# Patient Record
Sex: FEMALE | Race: BLACK OR AFRICAN AMERICAN | Employment: FULL TIME | URBAN - METROPOLITAN AREA
[De-identification: names, ages, dates, MRNs, and addresses within clinical notes are randomized per-mention and may not be internally consistent; named-entity substitution may affect disease eponyms.]

---

## 2017-01-10 ENCOUNTER — HOSPITAL ENCOUNTER (EMERGENCY)
Age: 22
Discharge: HOME | End: 2017-01-10
Attending: EMERGENCY MEDICINE | Admitting: EMERGENCY MEDICINE
Payer: COMMERCIAL

## 2017-01-10 VITALS
DIASTOLIC BLOOD PRESSURE: 80 MMHG | TEMPERATURE: 98.2 F | SYSTOLIC BLOOD PRESSURE: 87 MMHG | RESPIRATION RATE: 16 BRPM | OXYGEN SATURATION: 100 % | BODY MASS INDEX: 18.48 KG/M2 | HEIGHT: 68 IN | HEART RATE: 77 BPM | WEIGHT: 121.91 LBS

## 2017-01-10 DIAGNOSIS — N39.0 URINARY TRACT INFECTION, SITE UNSPECIFIED: Primary | ICD-10-CM

## 2017-01-10 DIAGNOSIS — B37.31 YEAST INFECTION OF THE VAGINA: ICD-10-CM

## 2017-01-10 LAB
APPEARANCE UR: ABNORMAL
BACTERIA URNS QL MICRO: ABNORMAL /HPF
BILIRUB UR QL CFM: NEGATIVE
CLUE CELLS VAG QL WET PREP: NORMAL
COLOR UR: ABNORMAL
EPITH CASTS URNS QL MICRO: ABNORMAL /LPF
GLUCOSE UR STRIP.AUTO-MCNC: NEGATIVE MG/DL
HCG UR QL: NEGATIVE
HGB UR QL STRIP: ABNORMAL
KETONES UR QL STRIP.AUTO: ABNORMAL MG/DL
KOH PREP SPEC: NORMAL
LEUKOCYTE ESTERASE UR QL STRIP.AUTO: ABNORMAL
MUCOUS THREADS URNS QL MICRO: ABNORMAL /LPF
NITRITE UR QL STRIP.AUTO: NEGATIVE
PH UR STRIP: 6 [PH] (ref 5–8)
PROT UR STRIP-MCNC: ABNORMAL MG/DL
RBC #/AREA URNS HPF: ABNORMAL /HPF (ref 0–5)
SERVICE CMNT-IMP: NORMAL
SP GR UR REFRACTOMETRY: 1.03 (ref 1–1.03)
T VAGINALIS VAG QL WET PREP: NORMAL
UA: UC IF INDICATED,UAUC: ABNORMAL
UROBILINOGEN UR QL STRIP.AUTO: 0.2 EU/DL (ref 0.2–1)
WBC URNS QL MICRO: ABNORMAL /HPF (ref 0–4)

## 2017-01-10 PROCEDURE — 87210 SMEAR WET MOUNT SALINE/INK: CPT | Performed by: EMERGENCY MEDICINE

## 2017-01-10 PROCEDURE — 87491 CHLMYD TRACH DNA AMP PROBE: CPT | Performed by: EMERGENCY MEDICINE

## 2017-01-10 PROCEDURE — 99284 EMERGENCY DEPT VISIT MOD MDM: CPT

## 2017-01-10 PROCEDURE — 81001 URINALYSIS AUTO W/SCOPE: CPT | Performed by: EMERGENCY MEDICINE

## 2017-01-10 PROCEDURE — 87186 SC STD MICRODIL/AGAR DIL: CPT | Performed by: EMERGENCY MEDICINE

## 2017-01-10 PROCEDURE — 81025 URINE PREGNANCY TEST: CPT | Performed by: EMERGENCY MEDICINE

## 2017-01-10 PROCEDURE — 87077 CULTURE AEROBIC IDENTIFY: CPT | Performed by: EMERGENCY MEDICINE

## 2017-01-10 PROCEDURE — 87086 URINE CULTURE/COLONY COUNT: CPT | Performed by: EMERGENCY MEDICINE

## 2017-01-10 RX ORDER — CEPHALEXIN 500 MG/1
500 CAPSULE ORAL 4 TIMES DAILY
Qty: 28 CAP | Refills: 0 | Status: SHIPPED | OUTPATIENT
Start: 2017-01-10 | End: 2017-01-17

## 2017-01-10 RX ORDER — FLUCONAZOLE 150 MG/1
150 TABLET ORAL
Qty: 1 TAB | Refills: 1 | Status: SHIPPED | OUTPATIENT
Start: 2017-01-10 | End: 2017-01-10

## 2017-01-10 NOTE — ED PROVIDER NOTES
HPI Comments: Koen Reddy is a 19 y.o. female who presents ambulatory to the ED c/o progressively worsening dysuria with associated LLQ pain x 2 days. Pt states her sx's began after having sexual intercourse and notes the condom may have irritated her. Pt notes her LMP was 12/29 and reportedly normal. She denies any known hx of long standing diseases or daily medications. Pt specifically denies any recent fever, chills, nausea, vomiting, diarrhea, hematuria, changes in BM, vaginal bleeding, vaginal discharge, vaginal rashes or sores. Social history significant for: - Tobacco, - EtOH, - Illicit drug use    PCP: Alanna Pascual MD    There are no other complaints, changes or physical findings at this time. Written by NELSON Larios, as dictated by Rehan Polk MD        The history is provided by the patient. No  was used. Past Medical History:   Diagnosis Date    Asthma        Past Surgical History:   Procedure Laterality Date    Hx orthopaedic       ACL repaired         History reviewed. No pertinent family history. Social History     Social History    Marital status: SINGLE     Spouse name: N/A    Number of children: N/A    Years of education: N/A     Occupational History    Not on file. Social History Main Topics    Smoking status: Never Smoker    Smokeless tobacco: Not on file    Alcohol use No    Drug use: No    Sexual activity: Yes     Partners: Male     Birth control/ protection: Pill     Other Topics Concern    Not on file     Social History Narrative         ALLERGIES: Latex and Pcn [penicillins]    Review of Systems   Constitutional: Negative. HENT: Negative. Eyes: Negative. Respiratory: Negative. Cardiovascular: Negative. Gastrointestinal: Positive for abdominal pain (LLQ). Endocrine: Negative. Genitourinary: Positive for dysuria. Negative for genital sores, vaginal bleeding, vaginal discharge and vaginal pain. Musculoskeletal: Negative. Skin: Negative. Negative for rash. Allergic/Immunologic: Negative. Neurological: Negative. Hematological: Negative. Psychiatric/Behavioral: Negative. All other systems reviewed and are negative. Vitals:    01/10/17 0047   BP: (!) 87/80   Pulse: 77   Resp: 16   Temp: 98.2 °F (36.8 °C)   SpO2: 100%   Weight: 55.3 kg (121 lb 14.6 oz)   Height: 5' 8\" (1.727 m)            Physical Exam   Nursing note and vitals reviewed.   General appearance - well nourished, well appearing, and in no distress  Eyes - pupils equal and reactive, extraocular eye movements intact  ENT - mucous membranes moist, pharynx normal without lesions  Neck - supple, no significant adenopathy; non-tender to palpation  Chest - clear to auscultation, no wheezes, rales or rhonchi; non-tender to palpation  Heart - normal rate and regular rhythm, S1 and S2 normal, no murmurs noted  Abdomen - soft, mild lower quadrant tenderness, nondistended, no masses or organomegaly  Musculoskeletal - no joint tenderness, deformity or swelling; normal ROM  Extremities - peripheral pulses normal, no pedal edema  Skin - normal coloration and turgor, no rashes  Neurological - alert, oriented x3, normal speech, no focal findings or movement disorder noted   - small amount of mucoid discharge, tenderness over the L adnexa, cervical OS closed, no CMT  Written by Jackie Gonzalez ED Scribe, as dictated by Marisela Gaines MD    MDM  Number of Diagnoses or Management Options  Urinary tract infection, site unspecified:   Yeast infection of the vagina:   Diagnosis management comments:   DDx: UTI, STD, PID       Amount and/or Complexity of Data Reviewed  Clinical lab tests: ordered and reviewed  Review and summarize past medical records: yes    Patient Progress  Patient progress: stable      Procedures    Procedure Note - Pelvic Exam:    3:14 AM  Performed by: Marisela Gaines MD  Chaperoned by: Abdirizak Lucero RN  Pelvic exam was performed using bimanual and speculum. Further findings noted in physical exam.   The procedure took 1-15 minutes, and pt tolerated well. Written by NELSON Givens, as dictated by Lucía Arndt MD    LABORATORY TESTS:  Recent Results (from the past 12 hour(s))   URINALYSIS W/ REFLEX CULTURE    Collection Time: 01/10/17  1:29 AM   Result Value Ref Range    Color YELLOW/STRAW      Appearance CLOUDY (A) CLEAR      Specific gravity 1.029 1.003 - 1.030      pH (UA) 6.0 5.0 - 8.0      Protein TRACE (A) NEG mg/dL    Glucose NEGATIVE  NEG mg/dL    Ketone TRACE (A) NEG mg/dL    Blood SMALL (A) NEG      Urobilinogen 0.2 0.2 - 1.0 EU/dL    Nitrites NEGATIVE  NEG      Leukocyte Esterase MODERATE (A) NEG      WBC 20-50 0 - 4 /hpf    RBC 0-5 0 - 5 /hpf    Epithelial cells MODERATE (A) FEW /lpf    Bacteria 3+ (A) NEG /hpf    UA:UC IF INDICATED URINE CULTURE ORDERED (A) CNI      Mucus 1+ (A) NEG /lpf   BILIRUBIN, CONFIRM    Collection Time: 01/10/17  1:29 AM   Result Value Ref Range    Bilirubin UA, confirm NEGATIVE  NEG     KOH, OTHER SOURCES    Collection Time: 01/10/17  3:16 AM   Result Value Ref Range    Special Requests: NO SPECIAL REQUESTS      KOH       1+ YEAST PRESENT, MANY WITH PSEUDOHYPHAE AND SOME ARE BUDDING   WET PREP    Collection Time: 01/10/17  3:16 AM   Result Value Ref Range    Clue cells CLUE CELLS ABSENT      Wet prep NO TRICHOMONAS SEEN     HCG URINE, QL    Collection Time: 01/10/17  4:25 AM   Result Value Ref Range    HCG urine, Ql. NEGATIVE  NEG         IMAGING RESULTS:  No orders to display       MEDICATIONS GIVEN:  Medications - No data to display    IMPRESSION:  1. Urinary tract infection, site unspecified    2. Yeast infection of the vagina        PLAN:  1. Discharge Medication List as of 1/10/2017  4:20 AM      START taking these medications    Details   fluconazole (DIFLUCAN) 150 mg tablet Take 1 Tab by mouth now for 1 dose.  FDA advises cautious prescribing of oral fluconazole in pregnancy. , Print, Disp-1 Tab, R-1      cephALEXin (KEFLEX) 500 mg capsule Take 1 Cap by mouth four (4) times daily for 7 days. , Print, Disp-28 Cap, R-0         CONTINUE these medications which have NOT CHANGED    Details   ibuprofen (MOTRIN) 400 mg tablet Take 1 Tab by mouth every six (6) hours as needed for Pain., Print, Disp-20 Tab, R-0      loratadine (CLARITIN) 10 mg tablet Take 1 Tab by mouth daily. , Print, Disp-20 Tab, R-0      albuterol (PROVENTIL HFA, VENTOLIN HFA, PROAIR HFA) 90 mcg/actuation inhaler Take 1-2 Puffs by inhalation every four (4) hours as needed for Wheezing., Print, Disp-1 Inhaler, R-1      fluticasone (FLONASE) 50 mcg/actuation nasal spray 2 Sprays by Both Nostrils route daily. , Print, Disp-1 Bottle, R-0           2. Follow-up Information     Follow up With Details Comments Contact Info    [facility] EMERGENCY DEPT  If symptoms worsen [address] [facility] [address] [place] [address] [address]          Return to ED if worse     DISCHARGE NOTE:  4:20 AM  The patient's results have been reviewed with family and/or caregiver. They verbally convey their understanding and agreement of the patient's signs, symptoms, diagnosis, treatment, and prognosis. They additionally agree to follow up as recommended in the discharge instructions or to return to the Emergency Room should the patient's condition change prior to their follow-up appointment. The family and/or caregiver verbally agrees with the care-plan and all of their questions have been answered. The discharge instructions have also been provided to the them along with educational information regarding the patient's diagnosis and a list of reasons why the patient would want to return to the ER prior to their follow-up appointment should their condition change.   Written by NELSON Rey, as dictated by Nnamdi Rivas MD.         This note is prepared by Darby Wallace, acting as Scribe for MD Lucía Chiu MD : The scribe's documentation has been prepared under my direction and personally reviewed by me in its entirety. I confirm that the note above accurately reflects all work, treatment, procedures, and medical decision making performed by me. This note will not be viewable in [address]

## 2017-01-10 NOTE — DISCHARGE INSTRUCTIONS
Candidiasis: Care Instructions  Your Care Instructions  Candidiasis (say \"mgf-qbg-DY-uh-ervin\") is a yeast infection. Yeast normally lives in your body. But it can cause problems if your body's defenses don't work as they should. Some medicines can increase your chance of getting a yeast infection. These include antibiotics, steroids, and cancer drugs. And some diseases like AIDS and diabetes can make you more likely to get yeast infections. There are different types of yeast infections. Ritchie Pile is a yeast infection in the mouth. It usually occurs in people with weak immune systems. It causes white patches inside the mouth and throat. Yeast infections of the skin usually occur in skin folds where the skin stays moist. They cause red, oozing patches on your skin. Babies can get these infections under the diaper. People who often wear gloves can get them on their hands. Many women get vaginal yeast infections. They are most common when women take antibiotics. These infections can cause the vagina to itch and burn. They also cause white discharge that looks like cottage cheese. In rare cases, yeast infects the blood. This can cause serious disease. This kind of infection is treated with medicine given through a needle into a vein (IV). After you start treatment, a yeast infection usually goes away quickly. But if your immune system is weak, the infection may come back. Tell your doctor if you get yeast infections often. Follow-up care is a key part of your treatment and safety. Be sure to make and go to all appointments, and call your doctor if you are having problems. It's also a good idea to know your test results and keep a list of the medicines you take. How can you care for yourself at home? · Take your medicines exactly as prescribed. Call your doctor if you think you are having a problem with your medicine. · Use antibiotics only as directed by your doctor. · Eat yogurt with live cultures.  It has bacteria called lactobacillus. It may help prevent some types of yeast infections. · Keep your skin clean and dry. Put powder on moist places. · If you are using a cream or suppository to treat a vaginal yeast infection, don't use condoms or a diaphragm. Use a different type of birth control. · Eat a healthy diet and get regular exercise. This will help keep your immune system strong. When should you call for help? Call your doctor now or seek immediate medical care if:  · You have a fever. · You are pregnant and have signs of a vaginal or urinary tract infection such as:  ¨ Severe itching in your vagina. ¨ Pain during sex or when you urinate. ¨ Unusual discharge from your vagina. ¨ A frequent urge to urinate. ¨ Urine that is cloudy or smells bad. Watch closely for changes in your health, and be sure to contact your doctor if:  · You do not get better as expected. Where can you learn more? Go to http://dee-chuy.info/. Enter S289 in the search box to learn more about \"Candidiasis: Care Instructions. \"  Current as of: February 5, 2016  Content Version: 11.1  © 4465-2923 [employer]. Care instructions adapted under license by [employer] (which disclaims liability or warranty for this information). If you have questions about a medical condition or this instruction, always ask your healthcare professional. [place] [address] any warranty or liability for your use of this information. Urinary Tract Infection in Women: Care Instructions  Your Care Instructions    A urinary tract infection, or UTI, is a general term for an infection anywhere between the kidneys and the urethra (where urine comes out). Most UTIs are bladder infections. They often cause pain or burning when you urinate. UTIs are caused by bacteria and can be cured with antibiotics. Be sure to complete your treatment so that the infection goes away.   Follow-up care is a key part of your treatment and safety. Be sure to make and go to all appointments, and call your doctor if you are having problems. It's also a good idea to know your test results and keep a list of the medicines you take. How can you care for yourself at home? · Take your antibiotics as directed. Do not stop taking them just because you feel better. You need to take the full course of antibiotics. · Drink extra water and other fluids for the next day or two. This may help wash out the bacteria that are causing the infection. (If you have kidney, heart, or liver disease and have to limit fluids, talk with your doctor before you increase your fluid intake.)  · Avoid drinks that are carbonated or have caffeine. They can irritate the bladder. · Urinate often. Try to empty your bladder each time. · To relieve pain, take a hot bath or lay a heating pad set on low over your lower belly or genital area. Never go to sleep with a heating pad in place. To prevent UTIs  · Drink plenty of water each day. This helps you urinate often, which clears bacteria from your system. (If you have kidney, heart, or liver disease and have to limit fluids, talk with your doctor before you increase your fluid intake.)  · Consider adding cranberry juice to your diet. · Urinate when you need to. · Urinate right after you have sex. · Change sanitary pads often. · Avoid douches, bubble baths, feminine hygiene sprays, and other feminine hygiene products that have deodorants. · After going to the bathroom, wipe from front to back. When should you call for help? Call your doctor now or seek immediate medical care if:  · Symptoms such as fever, chills, nausea, or vomiting get worse or appear for the first time. · You have new pain in your back just below your rib cage. This is called flank pain. · There is new blood or pus in your urine. · You have any problems with your antibiotic medicine.   Watch closely for changes in your health, and be sure to contact your doctor if:  · You are not getting better after taking an antibiotic for 2 days. · Your symptoms go away but then come back. Where can you learn more? Go to http://dee-chuy.info/. Enter P141 in the search box to learn more about \"Urinary Tract Infection in Women: Care Instructions. \"  Current as of: August 12, 2016  Content Version: 11.1  © 2303-5030 [employer]. Care instructions adapted under license by [employer] (which disclaims liability or warranty for this information). If you have questions about a medical condition or this instruction, always ask your healthcare professional. [address] any warranty or liability for your use of this information.

## 2017-01-10 NOTE — ED NOTES
Lucía Bradley reviewed discharge instructions with the patient. The patient verbalized understanding. All questions and concerns were addressed. The patient is discharged ambulatory in the care of family members with instructions and prescriptions in hand. Pt is alert and oriented x 4. Respirations are clear and unlabored.

## 2017-01-10 NOTE — ED NOTES
Bedside and Verbal shift change report given to [facility], RN by Carlos Coleman RN. Report included the following information SBAR, ED Summary and MAR.

## 2017-01-11 LAB
C TRACH DNA SPEC QL NAA+PROBE: NEGATIVE
N GONORRHOEA DNA SPEC QL NAA+PROBE: NEGATIVE
SAMPLE TYPE: NORMAL
SERVICE CMNT-IMP: NORMAL
SPECIMEN SOURCE: NORMAL

## 2017-01-12 LAB
BACTERIA SPEC CULT: NORMAL
CC UR VC: NORMAL
SERVICE CMNT-IMP: NORMAL

## 2017-01-18 ENCOUNTER — HOSPITAL ENCOUNTER (EMERGENCY)
Age: 22
Discharge: HOME | End: 2017-01-18
Attending: INTERNAL MEDICINE
Payer: COMMERCIAL

## 2017-01-18 VITALS
HEART RATE: 55 BPM | BODY MASS INDEX: 17.58 KG/M2 | DIASTOLIC BLOOD PRESSURE: 69 MMHG | OXYGEN SATURATION: 97 % | TEMPERATURE: 97.7 F | SYSTOLIC BLOOD PRESSURE: 125 MMHG | WEIGHT: 116 LBS | HEIGHT: 68 IN | RESPIRATION RATE: 18 BRPM

## 2017-01-18 DIAGNOSIS — J06.9 ACUTE UPPER RESPIRATORY INFECTION: Primary | ICD-10-CM

## 2017-01-18 PROCEDURE — 99283 EMERGENCY DEPT VISIT LOW MDM: CPT

## 2017-01-18 RX ORDER — CETIRIZINE HYDROCHLORIDE, PSEUDOEPHEDRINE HYDROCHLORIDE 5; 120 MG/1; MG/1
1 TABLET, FILM COATED, EXTENDED RELEASE ORAL 2 TIMES DAILY
Qty: 10 TAB | Refills: 0 | Status: SHIPPED | OUTPATIENT
Start: 2017-01-18 | End: 2017-01-23

## 2017-01-18 NOTE — LETTER
[facility] EMERGENCY DEPT 
[address] [address] 
[phone] Work/School Note Date: 1/18/2017 To Whom It May concern: 
 
Blane Rinne was seen and treated today in the emergency room by the following provider(s): 
Attending Provider: Aaliyah Mckeon MD.   
 
Shade Anabelne {Return to work 1/19 Sincerely, Aaliyah Mckeon MD

## 2017-01-19 NOTE — ED NOTES
Patient given copy of dc instructions and 1 script(s). Patient verbalized understanding of instructions and script (s). Patient given a current medication reconciliation form and verbalized understanding of their medications. Patient verbalized understanding of the importance of discussing medications with  his or her physician or clinic they will be following up with. Patient alert and oriented and in no acute distress. Patient discharged home ambulatory with self.

## 2017-01-19 NOTE — DISCHARGE INSTRUCTIONS

## 2017-01-19 NOTE — ED NOTES
Patient reports to ED c/o chest congestion, sore throat and HA x 2 days. Patient reports hx of asthma and is out of inhaler. Patient in NAD. Emergency Department Nursing Plan of Care       The Nursing Plan of Care is developed from the Nursing assessment and Emergency Department Attending provider initial evaluation. The plan of care may be reviewed in the ED Provider note.     The Plan of Care was developed with the following considerations:   Patient / Family readiness to learn indicated by:verbalized understanding  Persons(s) to be included in education: patient  Barriers to Learning/Limitations:No    Signed     Dianne Glass RN    1/18/2017   7:01 PM

## 2017-01-19 NOTE — ED PROVIDER NOTES
Patient is a 19 y.o. female presenting with cold symptoms. Cold Symptoms    This is a new problem. The current episode started 2 days ago. There has been no fever. Associated symptoms include congestion, rhinorrhea, sinus pain and cough. Pertinent negatives include no ear pain. Treatments tried: otc meds. Past Medical History:   Diagnosis Date    Asthma        Past Surgical History:   Procedure Laterality Date    Hx orthopaedic       ACL repaired         No family history on file. Social History     Social History    Marital status: SINGLE     Spouse name: N/A    Number of children: N/A    Years of education: N/A     Occupational History    Not on file. Social History Main Topics    Smoking status: Never Smoker    Smokeless tobacco: Not on file    Alcohol use No    Drug use: No    Sexual activity: Yes     Partners: Male     Birth control/ protection: Pill     Other Topics Concern    Not on file     Social History Narrative         ALLERGIES: Latex and Pcn [penicillins]    Review of Systems   Constitutional: Negative. HENT: Positive for congestion and rhinorrhea. Negative for ear pain. Respiratory: Positive for cough. Cardiovascular: Negative. Gastrointestinal: Negative. Genitourinary: Negative. Musculoskeletal: Negative. Neurological: Negative. Vitals:    01/18/17 1752   BP: 125/69   Pulse: (!) 55   Resp: 18   Temp: 97.7 °F (36.5 °C)   SpO2: 97%   Weight: 52.6 kg (116 lb)   Height: 5' 8\" (1.727 m)            Physical Exam   Constitutional: She is oriented to person, place, and time. She appears well-developed and well-nourished. No distress. HENT:   Head: Normocephalic and atraumatic. Left Ear: External ear normal.   Mouth/Throat: Oropharynx is clear and moist. No oropharyngeal exudate. Eyes: Conjunctivae and EOM are normal. Pupils are equal, round, and reactive to light. Left eye exhibits no discharge. Neck: No JVD present.    Cardiovascular: Normal rate, regular rhythm and normal heart sounds. No murmur heard. Pulmonary/Chest: Effort normal and breath sounds normal. No respiratory distress. She has no wheezes. She has no rales. Abdominal: Soft. Bowel sounds are normal. She exhibits no distension. There is no tenderness. There is no rebound and no guarding. Musculoskeletal: Normal range of motion. She exhibits no edema. Neurological: She is alert and oriented to person, place, and time. Skin: Skin is warm. No erythema. Nursing note and vitals reviewed.        [facility]  ED Course       Procedures

## 2017-02-15 ENCOUNTER — HOSPITAL ENCOUNTER (EMERGENCY)
Age: 22
Discharge: LEFT BEFORE DISCHARGE | End: 2017-02-15
Attending: EMERGENCY MEDICINE
Payer: COMMERCIAL

## 2017-02-15 VITALS
WEIGHT: 117 LBS | HEIGHT: 68 IN | HEART RATE: 86 BPM | BODY MASS INDEX: 17.73 KG/M2 | DIASTOLIC BLOOD PRESSURE: 54 MMHG | OXYGEN SATURATION: 100 % | TEMPERATURE: 98.2 F | SYSTOLIC BLOOD PRESSURE: 105 MMHG | RESPIRATION RATE: 16 BRPM

## 2017-02-15 PROCEDURE — 75810000275 HC EMERGENCY DEPT VISIT NO LEVEL OF CARE

## 2017-03-20 ENCOUNTER — HOSPITAL ENCOUNTER (EMERGENCY)
Age: 22
Discharge: HOME | End: 2017-03-20
Attending: EMERGENCY MEDICINE
Payer: COMMERCIAL

## 2017-03-20 VITALS
SYSTOLIC BLOOD PRESSURE: 118 MMHG | HEIGHT: 68 IN | RESPIRATION RATE: 16 BRPM | BODY MASS INDEX: 17.43 KG/M2 | WEIGHT: 115 LBS | HEART RATE: 88 BPM | TEMPERATURE: 98.2 F | DIASTOLIC BLOOD PRESSURE: 47 MMHG | OXYGEN SATURATION: 100 %

## 2017-03-20 DIAGNOSIS — J01.00 ACUTE MAXILLARY SINUSITIS, RECURRENCE NOT SPECIFIED: Primary | ICD-10-CM

## 2017-03-20 LAB
APPEARANCE UR: ABNORMAL
BACTERIA URNS QL MICRO: NEGATIVE /HPF
BILIRUB UR QL: NEGATIVE
COLOR UR: ABNORMAL
EPITH CASTS URNS QL MICRO: ABNORMAL /LPF
GLUCOSE UR STRIP.AUTO-MCNC: NEGATIVE MG/DL
HCG UR QL: NEGATIVE
HGB UR QL STRIP: NEGATIVE
KETONES UR QL STRIP.AUTO: NEGATIVE MG/DL
LEUKOCYTE ESTERASE UR QL STRIP.AUTO: NEGATIVE
NITRITE UR QL STRIP.AUTO: NEGATIVE
PH UR STRIP: 5.5 [PH] (ref 5–8)
PROT UR STRIP-MCNC: NEGATIVE MG/DL
RBC #/AREA URNS HPF: ABNORMAL /HPF (ref 0–5)
SP GR UR REFRACTOMETRY: 1.02 (ref 1–1.03)
UA: UC IF INDICATED,UAUC: ABNORMAL
UROBILINOGEN UR QL STRIP.AUTO: 0.2 EU/DL (ref 0.2–1)
WBC URNS QL MICRO: ABNORMAL /HPF (ref 0–4)

## 2017-03-20 PROCEDURE — 81001 URINALYSIS AUTO W/SCOPE: CPT | Performed by: EMERGENCY MEDICINE

## 2017-03-20 PROCEDURE — 99283 EMERGENCY DEPT VISIT LOW MDM: CPT

## 2017-03-20 PROCEDURE — 81025 URINE PREGNANCY TEST: CPT

## 2017-03-20 RX ORDER — AZITHROMYCIN 250 MG/1
TABLET, FILM COATED ORAL
Qty: 6 TAB | Refills: 0 | Status: SHIPPED | OUTPATIENT
Start: 2017-03-20 | End: 2017-03-25

## 2017-03-20 RX ORDER — LORATADINE 10 MG/1
10 TABLET ORAL DAILY
Qty: 20 TAB | Refills: 0 | Status: SHIPPED | OUTPATIENT
Start: 2017-03-20 | End: 2017-04-02

## 2017-03-20 RX ORDER — BUTALBITAL, ACETAMINOPHEN AND CAFFEINE 300; 40; 50 MG/1; MG/1; MG/1
1 CAPSULE ORAL
Qty: 20 CAP | Refills: 0 | Status: SHIPPED | OUTPATIENT
Start: 2017-03-20 | End: 2017-04-02

## 2017-03-20 NOTE — ED NOTES
Pt D/C by Dr. Santi Biggs. Pt left unit steady gait with her mom. NAD noted. Patient (s)  given copy of dc instructions and 2 script(s). Patient(s)  verbalized understanding of instructions and script (s). Patient given a current medication reconciliation form and verbalized understanding of their medications. Patient (s) verbalized understanding of the importance of discussing medications with  his or her physician or clinic when they follow up. Patient alert and oriented and in no acute distress. Pt verbalizes pain scale of 5 out of 10. Patient discharged home ambulatory with mom.

## 2017-03-20 NOTE — DISCHARGE INSTRUCTIONS
Sinusitis: Care Instructions  Your Care Instructions    Sinusitis is an infection of the lining of the sinus cavities in your head. Sinusitis often follows a cold. It causes pain and pressure in your head and face. In most cases, sinusitis gets better on its own in 1 to 2 weeks. But some mild symptoms may last for several weeks. Sometimes antibiotics are needed. Follow-up care is a key part of your treatment and safety. Be sure to make and go to all appointments, and call your doctor if you are having problems. It's also a good idea to know your test results and keep a list of the medicines you take. How can you care for yourself at home? · Take an over-the-counter pain medicine, such as acetaminophen (Tylenol), ibuprofen (Advil, Motrin), or naproxen (Aleve). Read and follow all instructions on the label. · If the doctor prescribed antibiotics, take them as directed. Do not stop taking them just because you feel better. You need to take the full course of antibiotics. · Be careful when taking over-the-counter cold or flu medicines and Tylenol at the same time. Many of these medicines have acetaminophen, which is Tylenol. Read the labels to make sure that you are not taking more than the recommended dose. Too much acetaminophen (Tylenol) can be harmful. · Breathe warm, moist air from a steamy shower, a hot bath, or a sink filled with hot water. Avoid cold, dry air. Using a humidifier in your home may help. Follow the directions for cleaning the machine. · Use saline (saltwater) nasal washes to help keep your nasal passages open and wash out mucus and bacteria. You can buy saline nose drops at a grocery store or drugstore. Or you can make your own at home by adding 1 teaspoon of salt and 1 teaspoon of baking soda to 2 cups of distilled water. If you make your own, fill a bulb syringe with the solution, insert the tip into your nostril, and squeeze gently. Rosalva Arnav your nose.   · Put a hot, wet towel or a warm gel pack on your face 3 or 4 times a day for 5 to 10 minutes each time. · Try a decongestant nasal spray like oxymetazoline (Afrin). Do not use it for more than 3 days in a row. Using it for more than 3 days can make your congestion worse. When should you call for help? Call your doctor now or seek immediate medical care if:  · You have new or worse swelling or redness in your face or around your eyes. · You have a new or higher fever. Watch closely for changes in your health, and be sure to contact your doctor if:  · You have new or worse facial pain. · The mucus from your nose becomes thicker (like pus) or has new blood in it. · You are not getting better as expected. Where can you learn more? Go to http://dee-chuy.info/. Enter B607 in the search box to learn more about \"Sinusitis: Care Instructions. \"  Current as of: July 29, 2016  Content Version: 11.1  © [id number] [employer]. Care instructions adapted under license by [employer] (which disclaims liability or warranty for this information). If you have questions about a medical condition or this instruction, always ask your healthcare professional. [place] [address] any warranty or liability for your use of this information.

## 2017-03-20 NOTE — LETTER
[facility] EMERGENCY DEPT 
[address] [facility] [address] [address] 
[phone] Work/School Note Date: 3/20/2017 To Whom It May concern: 
 
Geoffrey Hernandez was seen and treated today in the emergency room by the following provider(s): 
Attending Provider: Lakisha Navarro MD.   
 
Geoffrey Hernandez may return to work on 3/21/2017. Sincerely, KAREN Jones

## 2017-03-20 NOTE — ED PROVIDER NOTES
HPI Comments: Robert Pickering is a 19 y.o. female with PMhx significant for Asthma who presents ambulatory to the ED with cc of gradually worsening HA x 2 days with associated eye pain, ear drainage, and nausea. Pt report that she took tylenol with no relief. Per chart review pt was seen at [facility] on 12/28/2016 for similar symptoms and was diagnosed with Acute frontal-Sinusitis. Pt also c/o back pain noting that its has been there \" for a while. \"  Pt denies any fevers, chills, N/V/D, or cough. Social history significant for: - Tobacco, - EtOH, - Illicit drug use    PCP: Monica Garcia MD    There are no other complaints, changes or physical findings at this time. Written by NELSON Benson, as dictated by Darien Flores. Kvng Galaviz MD      The history is provided by the patient. No  was used. Past Medical History:   Diagnosis Date    Asthma        Past Surgical History:   Procedure Laterality Date    HX ORTHOPAEDIC      ACL repaired         History reviewed. No pertinent family history. Social History     Social History    Marital status: SINGLE     Spouse name: N/A    Number of children: N/A    Years of education: N/A     Occupational History    Not on file. Social History Main Topics    Smoking status: Never Smoker    Smokeless tobacco: Not on file    Alcohol use No    Drug use: No    Sexual activity: Yes     Partners: Male     Birth control/ protection: Pill     Other Topics Concern    Not on file     Social History Narrative         ALLERGIES: Latex and Pcn [penicillins]    Review of Systems   Constitutional: Negative. Negative for activity change, appetite change, chills, fatigue and unexpected weight change. HENT: Positive for ear discharge. Negative for congestion, hearing loss, rhinorrhea, sneezing and voice change. Eyes: Positive for pain. Negative for visual disturbance. Respiratory: Negative.   Negative for apnea, cough, choking, chest tightness and shortness of breath. Cardiovascular: Negative. Negative for palpitations. Gastrointestinal: Negative. Negative for abdominal distention, blood in stool, diarrhea, nausea and vomiting. Genitourinary: Negative. Negative for difficulty urinating, flank pain, frequency and urgency. No discharge   Musculoskeletal: Negative for arthralgias, myalgias and neck stiffness. Skin: Negative. Negative for color change and rash. Neurological: Negative for dizziness, seizures, syncope and speech difficulty. Hematological: Negative for adenopathy. Psychiatric/Behavioral: Negative. Negative for agitation, behavioral problems, dysphoric mood and suicidal ideas. The patient is not nervous/anxious. Patient Vitals for the past 12 hrs:   Temp Pulse Resp BP SpO2   03/20/17 1221 98.2 °F (36.8 °C) 88 16 118/47 100 %     Physical Exam   Nursing note and vitals reviewed.    Physical Examination: General appearance - WDWN, in no apparent distress  Head - NC/AT  Eyes - pupils equal, round  and reactive, extraocular eye movements intact, conj/sclera clear, anicteric  Mouth - mucous membranes moist, pharynx normal without lesions  Nose/Ears - nares clear, Boggy nasal turbinates, Tm have fluids BL, Maxillary tenderness Left > right  Neck - supple, no significant adenopathy, trachea midline, no crepitus, c spine diffusely non-tender, no step offs  Chest - Normal respiratory effort, clear to auscultation bilaterally, no wheezes/rales/rhonchi  Heart - normal rate and regular rhythm, S1 and S2 normal, no murmurs, gallops, or rubs  Abdomen - soft, nontender, nondistended, nabs, no masses, guarding, rebound or rigidity  Neurological - alert, oriented, normal speech, cranial nerves intact, no focal motor findings, motor & sensory diffusely intact, normal gait  Extremities/MS - peripheral pulses normal, no pedal edema, all joints atraumatic, FROM, non-tender, no gross deformities, spine diffusely non-tender  Skin - normal coloration and turgor, no rashes, no lesions or lacerations    MDM  Number of Diagnoses or Management Options  Acute maxillary sinusitis, recurrence not specified:   Diagnosis management comments:   DDx: sinusitis, URI        Amount and/or Complexity of Data Reviewed  Clinical lab tests: ordered and reviewed  Review and summarize past medical records: yes    Patient Progress  Patient progress: stable    Procedures    LABORATORY TESTS:  Recent Results (from the past 12 hour(s))   URINALYSIS W/ REFLEX CULTURE    Collection Time: 03/20/17 12:32 PM   Result Value Ref Range    Color YELLOW/STRAW      Appearance CLOUDY (A) CLEAR      Specific gravity 1.025 1.003 - 1.030      pH (UA) 5.5 5.0 - 8.0      Protein NEGATIVE  NEG mg/dL    Glucose NEGATIVE  NEG mg/dL    Ketone NEGATIVE  NEG mg/dL    Bilirubin NEGATIVE  NEG      Blood NEGATIVE  NEG      Urobilinogen 0.2 0.2 - 1.0 EU/dL    Nitrites NEGATIVE  NEG      Leukocyte Esterase NEGATIVE  NEG      WBC 0-4 0 - 4 /hpf    RBC 0-5 0 - 5 /hpf    Epithelial cells FEW FEW /lpf    Bacteria NEGATIVE  NEG /hpf    UA:UC IF INDICATED CULTURE NOT INDICATED BY UA RESULT CNI     HCG URINE, QL. - POC    Collection Time: 03/20/17 12:33 PM   Result Value Ref Range    Pregnancy test,urine (POC) NEGATIVE  NEG       IMPRESSION:  1. Acute maxillary sinusitis, recurrence not specified        PLAN:  1. Discharge Medication List as of 3/20/2017  1:33 PM      START taking these medications    Details   azithromycin (ZITHROMAX Z-VALENTIN) 250 mg tablet 5 day pack, Print, Disp-6 Tab, R-0      butalbital-acetaminophen-caff (FIORICET) -40 mg per capsule Take 1 Cap by mouth every six (6) hours as needed for Pain. Max Daily Amount: 4 Caps., Print, Disp-20 Cap, R-0      loratadine (CLARITIN) 10 mg tablet Take 1 Tab by mouth daily. , Print, Disp-20 Tab, R-0         CONTINUE these medications which have NOT CHANGED    Details   ibuprofen (MOTRIN) 400 mg tablet Take 1 Tab by mouth every six (6) hours as needed for Pain., Print, Disp-20 Tab, R-0      albuterol (PROVENTIL HFA, VENTOLIN HFA, PROAIR HFA) 90 mcg/actuation inhaler Take 1-2 Puffs by inhalation every four (4) hours as needed for Wheezing., Print, Disp-1 Inhaler, R-1      fluticasone (FLONASE) 50 mcg/actuation nasal spray 2 Sprays by Both Nostrils route daily. , Print, Disp-1 Bottle, R-0           2. Follow-up Information     Follow up With Details Comments Contact Regina Gregory MD Schedule an appointment as soon as possible for a visit As needed [facility] [phone]  [phone]          Return to ED if worse     DISCHARGE NOTE  1:26 PM  The patient has been re-evaluated and is ready for discharge. Reviewed available results with patient. Counseled patient on diagnosis and care plan. Patient has expressed understanding, and all questions have been answered. Patient agrees with plan and agrees to follow up as recommended, or return to the ED if their symptoms worsen. Discharge instructions have been provided and explained to the patient, along with reasons to return to the ED. This note is prepared by Modesta Martinez, acting as Scribe for [employer]. Ronald Cline, [address] [place] [address]. Ronald Cline MD: The scribe's documentation has been prepared under my direction and personally reviewed by me in its entirety. I confirm that the note above accurately reflects all work, treatment, procedures, and medical decision making performed by me.

## 2017-04-02 ENCOUNTER — HOSPITAL ENCOUNTER (EMERGENCY)
Age: 22
Discharge: HOME | End: 2017-04-02
Attending: INTERNAL MEDICINE
Payer: COMMERCIAL

## 2017-04-02 VITALS
DIASTOLIC BLOOD PRESSURE: 61 MMHG | RESPIRATION RATE: 20 BRPM | WEIGHT: 116 LBS | OXYGEN SATURATION: 100 % | SYSTOLIC BLOOD PRESSURE: 115 MMHG | HEIGHT: 68 IN | TEMPERATURE: 98.1 F | HEART RATE: 88 BPM | BODY MASS INDEX: 17.58 KG/M2

## 2017-04-02 DIAGNOSIS — N39.0 URINARY TRACT INFECTION WITHOUT HEMATURIA, SITE UNSPECIFIED: Primary | ICD-10-CM

## 2017-04-02 DIAGNOSIS — J30.89 ENVIRONMENTAL AND SEASONAL ALLERGIES: ICD-10-CM

## 2017-04-02 LAB
APPEARANCE UR: CLEAR
BACTERIA URNS QL MICRO: ABNORMAL /HPF
BILIRUB UR QL: NEGATIVE
COLOR UR: ABNORMAL
EPITH CASTS URNS QL MICRO: ABNORMAL /LPF
GLUCOSE UR STRIP.AUTO-MCNC: NEGATIVE MG/DL
HCG UR QL: NEGATIVE
HGB UR QL STRIP: NEGATIVE
KETONES UR QL STRIP.AUTO: NEGATIVE MG/DL
LEUKOCYTE ESTERASE UR QL STRIP.AUTO: NEGATIVE
NITRITE UR QL STRIP.AUTO: NEGATIVE
PH UR STRIP: 6 [PH] (ref 5–8)
PROT UR STRIP-MCNC: NEGATIVE MG/DL
RBC #/AREA URNS HPF: ABNORMAL /HPF (ref 0–5)
SP GR UR REFRACTOMETRY: 1.02 (ref 1–1.03)
UA: UC IF INDICATED,UAUC: ABNORMAL
UROBILINOGEN UR QL STRIP.AUTO: 1 EU/DL (ref 0.2–1)
WBC URNS QL MICRO: ABNORMAL /HPF (ref 0–4)

## 2017-04-02 PROCEDURE — 99283 EMERGENCY DEPT VISIT LOW MDM: CPT

## 2017-04-02 PROCEDURE — 81001 URINALYSIS AUTO W/SCOPE: CPT | Performed by: PHYSICIAN ASSISTANT

## 2017-04-02 PROCEDURE — 81025 URINE PREGNANCY TEST: CPT

## 2017-04-02 PROCEDURE — 87086 URINE CULTURE/COLONY COUNT: CPT | Performed by: PHYSICIAN ASSISTANT

## 2017-04-02 RX ORDER — IBUPROFEN 400 MG/1
400 TABLET ORAL
Qty: 20 TAB | Refills: 0 | Status: SHIPPED | OUTPATIENT
Start: 2017-04-02 | End: 2017-04-25

## 2017-04-02 RX ORDER — FLUTICASONE PROPIONATE 50 MCG
2 SPRAY, SUSPENSION (ML) NASAL DAILY
Qty: 1 BOTTLE | Refills: 0 | Status: SHIPPED | OUTPATIENT
Start: 2017-04-02 | End: 2017-04-25

## 2017-04-02 RX ORDER — SULFAMETHOXAZOLE AND TRIMETHOPRIM 800; 160 MG/1; MG/1
1 TABLET ORAL 2 TIMES DAILY
Qty: 14 TAB | Refills: 0 | Status: SHIPPED | OUTPATIENT
Start: 2017-04-02 | End: 2017-04-09

## 2017-04-02 RX ORDER — LORATADINE 10 MG/1
10 TABLET ORAL DAILY
Qty: 20 TAB | Refills: 0 | Status: SHIPPED | OUTPATIENT
Start: 2017-04-02 | End: 2017-04-25

## 2017-04-02 NOTE — LETTER
[facility] EMERGENCY DEPT 
[address] [address] 
[phone] Work/School Note Date: 4/2/2017 To Whom It May concern: 
 
Barber Rasmussen was seen and treated today in the emergency room by the following provider(s): 
Attending Provider: Monica Soares MD 
Physician Assistant: Lena Keller PA-C. Barber Rasmussen may return to work on 4/3/17. Sincerely, Lena Keller PA-C

## 2017-04-03 NOTE — ED TRIAGE NOTES
Emergency Department Nursing Plan of Care       The Nursing Plan of Care is developed from the Nursing assessment and Emergency Department Attending provider initial evaluation. The plan of care may be reviewed in the ED Provider note.     The Plan of Care was developed with the following considerations:   Patient / Family readiness to learn indicated by:verbalized understanding  Persons(s) to be included in education: patient  Barriers to Learning/Limitations:No    Signed     Zan Schirmer, RN    4/2/2017   10:46 PM

## 2017-04-03 NOTE — ED NOTES
Pt presents ambulatory to ED complaining of right ear pain, headache, and diarrhea. Pt reports taking Claritin in the past and reports taking antibiotics recently. Pt is alert and oriented x 4, RR even and unlabored, skin is warm and dry. Assesment completed and pt updated on plan of care.

## 2017-04-03 NOTE — DISCHARGE INSTRUCTIONS
Allergies: Care Instructions  Your Care Instructions  Allergies occur when your body's defense system (immune system) overreacts to certain substances. The immune system treats a harmless substance as if it were a harmful germ or virus. Many things can cause this overreaction, including pollens, medicine, food, dust, animal dander, and mold. Allergies can be mild or severe. Mild allergies can be managed with home treatment. But medicine may be needed to prevent problems. Managing your allergies is an important part of staying healthy. Your doctor may suggest that you have allergy testing to help find out what is causing your allergies. When you know what things trigger your symptoms, you can avoid them. This can prevent allergy symptoms and other health problems. For severe allergies that cause reactions that affect your whole body (anaphylactic reactions), your doctor may prescribe a shot of epinephrine to carry with you in case you have a severe reaction. Learn how to give yourself the shot and keep it with you at all times. Make sure it is not . Follow-up care is a key part of your treatment and safety. Be sure to make and go to all appointments, and call your doctor if you are having problems. It's also a good idea to know your test results and keep a list of the medicines you take. How can you care for yourself at home? · If you have been told by your doctor that dust or dust mites are causing your allergy, decrease the dust around your bed:  [facility] sheets, pillowcases, and other bedding in hot water every week. ¨ Use dust-proof covers for pillows, duvets, and mattresses. Avoid plastic covers because they tear easily and do not \"breathe. \" Wash as instructed on the label. ¨ Do not use any blankets and pillows that you do not need. ¨ Use blankets that you can wash in your washing machine. ¨ Consider removing drapes and carpets, which attract and hold dust, from your bedroom.   · If you are allergic to house dust and mites, do not use home humidifiers. Your doctor can suggest ways you can control dust and mites. · Look for signs of cockroaches. Cockroaches cause allergic reactions. Use cockroach baits to get rid of them. Then, clean your home well. Cockroaches like areas where grocery bags, newspapers, empty bottles, or cardboard boxes are stored. Do not keep these inside your home, and keep trash and food containers sealed. Seal off any spots where cockroaches might enter your home. · If you are allergic to mold, get rid of furniture, rugs, and drapes that smell musty. Check for mold in the bathroom. · If you are allergic to outdoor pollen or mold spores, use air-conditioning. Change or clean all filters every month. Keep windows closed. · If you are allergic to pollen, stay inside when pollen counts are high. Use a vacuum  with a HEPA filter or a double-thickness filter at least two times each week. · Stay inside when air pollution is bad. Avoid paint fumes, perfumes, and other strong odors. · Avoid conditions that make your allergies worse. Stay away from smoke. Do not smoke or let anyone else smoke in your house. Do not use fireplaces or wood-burning stoves. · If you are allergic to your pets, change the air filter in your furnace every month. Use high-efficiency filters. · If you are allergic to pet dander, keep pets outside or out of your bedroom. Old carpet and cloth furniture can hold a lot of animal dander. You may need to replace them. When should you call for help? Give an epinephrine shot if:  · You think you are having a severe allergic reaction. · You have symptoms in more than one body area, such as mild nausea and an itchy mouth. After giving an epinephrine shot call 911, even if you feel better. Call 911 if:  · You have symptoms of a severe allergic reaction. These may include:  ¨ Sudden raised, red areas (hives) all over your body.   ¨ Swelling of the throat, mouth, lips, or tongue. ¨ Trouble breathing. ¨ Passing out (losing consciousness). Or you may feel very lightheaded or suddenly feel weak, confused, or restless. · You have been given an epinephrine shot, even if you feel better. Call your doctor now or seek immediate medical care if:  · You have symptoms of an allergic reaction, such as:  ¨ A rash or hives (raised, red areas on the skin). ¨ Itching. ¨ Swelling. ¨ Belly pain, nausea, or vomiting. Watch closely for changes in your health, and be sure to contact your doctor if:  · You do not get better as expected. Where can you learn more? Go to http://dee-chuy.info/. Enter Q620 in the search box to learn more about \"Allergies: Care Instructions. \"  Current as of: February 12, 2016  Content Version: 11.2  © 7042-7892 [employer]. Care instructions adapted under license by [employer] (which disclaims liability or warranty for this information). If you have questions about a medical condition or this instruction, always ask your healthcare professional. [place] [address] any warranty or liability for your use of this information. Urinary Tract Infection in Women: Care Instructions  Your Care Instructions    A urinary tract infection, or UTI, is a general term for an infection anywhere between the kidneys and the urethra (where urine comes out). Most UTIs are bladder infections. They often cause pain or burning when you urinate. UTIs are caused by bacteria and can be cured with antibiotics. Be sure to complete your treatment so that the infection goes away. Follow-up care is a key part of your treatment and safety. Be sure to make and go to all appointments, and call your doctor if you are having problems. It's also a good idea to know your test results and keep a list of the medicines you take. How can you care for yourself at home? · Take your antibiotics as directed.  Do not stop taking them just because you feel better. You need to take the full course of antibiotics. · Drink extra water and other fluids for the next day or two. This may help wash out the bacteria that are causing the infection. (If you have kidney, heart, or liver disease and have to limit fluids, talk with your doctor before you increase your fluid intake.)  · Avoid drinks that are carbonated or have caffeine. They can irritate the bladder. · Urinate often. Try to empty your bladder each time. · To relieve pain, take a hot bath or lay a heating pad set on low over your lower belly or genital area. Never go to sleep with a heating pad in place. To prevent UTIs  · Drink plenty of water each day. This helps you urinate often, which clears bacteria from your system. (If you have kidney, heart, or liver disease and have to limit fluids, talk with your doctor before you increase your fluid intake.)  · Urinate when you need to. · Urinate right after you have sex. · Change sanitary pads often. · Avoid douches, bubble baths, feminine hygiene sprays, and other feminine hygiene products that have deodorants. · After going to the bathroom, wipe from front to back. When should you call for help? Call your doctor now or seek immediate medical care if:  · Symptoms such as fever, chills, nausea, or vomiting get worse or appear for the first time. · You have new pain in your back just below your rib cage. This is called flank pain. · There is new blood or pus in your urine. · You have any problems with your antibiotic medicine. Watch closely for changes in your health, and be sure to contact your doctor if:  · You are not getting better after taking an antibiotic for 2 days. · Your symptoms go away but then come back. Where can you learn more? Go to http://dee-chuy.info/. Enter S817 in the search box to learn more about \"Urinary Tract Infection in Women: Care Instructions. \"  Current as of: November 28, 2016  Content Version: 11.2  © 5929-5251 [employer]. Care instructions adapted under license by [employer] (which disclaims liability or warranty for this information). If you have questions about a medical condition or this instruction, always ask your healthcare professional. [address] any warranty or liability for your use of this information.

## 2017-04-03 NOTE — ED NOTES
Discharge instructions were given to the patient by Provider. The patient left the Emergency Department ambulatory, alert and oriented and in no acute distress with 4 prescriptions and a note. The patient was encouraged to call or return to the ED for worsening issues or problems and was encouraged to schedule a follow up appointment for continuing care. The patient verbalized understanding of discharge instructions and prescriptions, all questions were answered. The patient has no further concerns at this time.

## 2017-04-03 NOTE — ED PROVIDER NOTES
Patient is a 19 y.o. female presenting with abdominal pain and headaches. The history is provided by the patient. Abdominal Pain    This is a new (Pt reports cramping abd pain, diarrhea, headache w/ chills x 1 day. ) problem. The current episode started yesterday. The problem occurs constantly. The problem has been rapidly improving. The pain is located in the generalized abdominal region. The quality of the pain is cramping. The pain is at a severity of 8/10. Associated symptoms include diarrhea and headaches. Pertinent negatives include no anorexia, no fever, no nausea, no vomiting, no constipation, no dysuria, no frequency, no arthralgias, no myalgias, no chest pain and no back pain. Nothing worsens the pain. The pain is relieved by nothing. Headache    This is a new problem. The current episode started yesterday. The problem occurs constantly. The problem has been rapidly improving (Pt reports taking tylenol and symptoms improving.). Pertinent negatives include no anorexia, no fever, no malaise/fatigue, no chest pressure, no near-syncope, no orthopnea, no palpitations, no syncope, no shortness of breath, no weakness, no tingling, no dizziness, no visual change, no nausea and no vomiting. She has tried acetaminophen for the symptoms. The treatment provided moderate relief. Past Medical History:   Diagnosis Date    Asthma        Past Surgical History:   Procedure Laterality Date    HX ORTHOPAEDIC      ACL repaired         History reviewed. No pertinent family history. Social History     Social History    Marital status: SINGLE     Spouse name: N/A    Number of children: N/A    Years of education: N/A     Occupational History    Not on file.      Social History Main Topics    Smoking status: Never Smoker    Smokeless tobacco: Not on file    Alcohol use No    Drug use: No    Sexual activity: Yes     Partners: Male     Birth control/ protection: Pill     Other Topics Concern    Not on file Social History Narrative         ALLERGIES: Latex and Pcn [penicillins]    Review of Systems   Constitutional: Negative for activity change, appetite change, chills, fatigue, fever, malaise/fatigue and unexpected weight change. HENT: Positive for ear pain and sneezing. Negative for congestion, postnasal drip, rhinorrhea, sore throat, trouble swallowing and voice change. Eyes: Positive for itching. Negative for photophobia, pain, discharge, redness and visual disturbance. Respiratory: Negative for cough and shortness of breath. Cardiovascular: Negative for chest pain, palpitations, orthopnea, syncope and near-syncope. Gastrointestinal: Positive for abdominal pain and diarrhea. Negative for abdominal distention, anorexia, blood in stool, constipation, nausea and vomiting. Genitourinary: Negative for decreased urine volume, difficulty urinating, dysuria, flank pain, frequency, pelvic pain and urgency. Musculoskeletal: Negative. Negative for arthralgias, back pain and myalgias. Skin: Negative. Negative for rash. Allergic/Immunologic: Positive for environmental allergies. Neurological: Positive for headaches. Negative for dizziness, tingling, weakness and light-headedness. Psychiatric/Behavioral: Negative. Vitals:    04/02/17 2118   BP: 115/61   Pulse: 88   Resp: 20   Temp: 98.1 °F (36.7 °C)   SpO2: 100%   Weight: 52.6 kg (116 lb)   Height: 5' 8\" (1.727 m)            Physical Exam   Constitutional: She is oriented to person, place, and time. She appears well-developed and well-nourished. No distress. Pt sitting upright and talking in complete sentences. Appears to be in NAD. HENT:   Head: Normocephalic and atraumatic. Right Ear: Hearing, tympanic membrane, external ear and ear canal normal.   Left Ear: Hearing, tympanic membrane, external ear and ear canal normal.   Nose: Nose normal. No mucosal edema or rhinorrhea.  Right sinus exhibits no maxillary sinus tenderness and no frontal sinus tenderness. Left sinus exhibits no maxillary sinus tenderness and no frontal sinus tenderness. Mouth/Throat: Uvula is midline, oropharynx is clear and moist and mucous membranes are normal. No oropharyngeal exudate. Eyes: Conjunctivae and EOM are normal. Pupils are equal, round, and reactive to light. Neck: Normal range of motion. Pulmonary/Chest: Effort normal and breath sounds normal. No accessory muscle usage. No respiratory distress. She has no decreased breath sounds. She has no wheezes. She has no rhonchi. She has no rales. Abdominal: Normal appearance. There is no tenderness. There is no rigidity, no rebound, no guarding, no CVA tenderness, no tenderness at McBurney's point and negative Rodriguez's sign. Neurological: She is alert and oriented to person, place, and time. No cranial nerve deficit. Skin: Skin is warm and dry. She is not diaphoretic. Psychiatric: She has a normal mood and affect. Her behavior is normal. Judgment and thought content normal.   Nursing note and vitals reviewed.        MDM  Number of Diagnoses or Management Options  Environmental and seasonal allergies:   Urinary tract infection without hematuria, site unspecified:   Diagnosis management comments: DDx: URI, gastroenteritis, colitis, viral illness, AOM, OE, seasonal allergies    LABORATORY TESTS:  Recent Results (from the past 12 hour(s))  -HCG URINE, QL. - POC  Collection Time: 04/02/17 10:01 PM       Result                                            Value                         Ref Range                       Pregnancy test,urine (POC)                        NEGATIVE                      NEG                        -URINALYSIS W/ REFLEX CULTURE  Collection Time: 04/02/17 10:02 PM       Result                                            Value                         Ref Range                       Color                                             YELLOW/STRAW Appearance                                        CLEAR                         CLEAR                           Specific gravity                                  1.025                         1.003 - 1.030                   pH (UA)                                           6.0                           5.0 - 8.0                       Protein                                           NEGATIVE                      NEG mg/dL                       Glucose                                           NEGATIVE                      NEG mg/dL                       Ketone                                            NEGATIVE                      NEG mg/dL                       Bilirubin                                         NEGATIVE                      NEG                             Blood                                             NEGATIVE                      NEG                             Urobilinogen                                      1.0                           0.2 - 1.0 EU/dL                 Nitrites                                          NEGATIVE                      NEG                             Leukocyte Esterase                                NEGATIVE                      NEG                             WBC                                               0-4                           0 - 4 /hpf                      RBC                                               0-5                           0 - 5 /hpf                      Epithelial cells                                  FEW                           FEW /lpf                        Bacteria                                          2+ (A)                        NEG /hpf                        UA:UC IF INDICATED                                URINE CULTURE ORDERED (A)     CNI                          IMAGING RESULTS:  No orders to display    MEDICATIONS GIVEN:  Medications - No data to display    IMPRESSION:  Urinary tract infection without hematuria, site unspecified  (primary encounter diagnosis)  Environmental and seasonal allergies    PLAN:  1. Current Discharge Medication List    START taking these medications    trimethoprim-sulfamethoxazole (BACTRIM DS) 160-800 mg per tablet  Take 1 Tab by mouth two (2) times a day for 7 days. Qty: 14 Tab Refills: 0      CONTINUE these medications which have CHANGED    loratadine (CLARITIN) 10 mg tablet  Take 1 Tab by mouth daily. Qty: 20 Tab Refills: 0    fluticasone (FLONASE) 50 mcg/actuation nasal spray  2 Sprays by Both Nostrils route daily. Qty: 1 Bottle Refills: 0    ibuprofen (MOTRIN) 400 mg tablet  Take 1 Tab by mouth every six (6) hours as needed for Pain. Qty: 20 Tab Refills: 0      CONTINUE these medications which have NOT CHANGED    butalbital-acetaminophen-caff (FIORICET) -40 mg per capsule  Take 1 Cap by mouth every six (6) hours as needed for Pain. Max Daily Amount: 4 Caps. Qty: 20 Cap Refills: 0    albuterol (PROVENTIL HFA, VENTOLIN HFA, PROAIR HFA) 90 mcg/actuation inhaler   Take 1-2 Puffs by inhalation every four (4) hours as needed for Wheezing. Qty: 1 Inhaler Refills: 1        2. Follow-up Information     Follow up With Details Comments Zari Chavarria MD Schedule an appointment as soon as possible   for a visit in 1 week As needed, If symptoms worsen [address] [facility] [address]        Return to ED if worse                  Amount and/or Complexity of Data Reviewed  Clinical lab tests: ordered and reviewed    Patient Progress  Patient progress: stable    ED Course       Procedures      10:42 PM  I have discussed with patient their diagnosis, treatment, and follow up plan. The patient agrees to follow up as outlined in discharge paperwork and also to return to the ED with any worsening.  Karen Mcginnis PA-C

## 2017-04-04 LAB
BACTERIA SPEC CULT: NORMAL
CC UR VC: NORMAL
SERVICE CMNT-IMP: NORMAL

## 2017-04-25 ENCOUNTER — HOSPITAL ENCOUNTER (EMERGENCY)
Age: 22
Discharge: HOME | End: 2017-04-25
Attending: EMERGENCY MEDICINE
Payer: COMMERCIAL

## 2017-04-25 VITALS
BODY MASS INDEX: 18.11 KG/M2 | WEIGHT: 119.49 LBS | DIASTOLIC BLOOD PRESSURE: 72 MMHG | TEMPERATURE: 98.5 F | HEIGHT: 68 IN | HEART RATE: 79 BPM | RESPIRATION RATE: 18 BRPM | OXYGEN SATURATION: 100 % | SYSTOLIC BLOOD PRESSURE: 138 MMHG

## 2017-04-25 DIAGNOSIS — T78.40XA ACUTE ALLERGIC REACTION, INITIAL ENCOUNTER: Primary | ICD-10-CM

## 2017-04-25 PROCEDURE — 74011636637 HC RX REV CODE- 636/637: Performed by: EMERGENCY MEDICINE

## 2017-04-25 PROCEDURE — 74011250637 HC RX REV CODE- 250/637: Performed by: EMERGENCY MEDICINE

## 2017-04-25 PROCEDURE — 99283 EMERGENCY DEPT VISIT LOW MDM: CPT

## 2017-04-25 RX ORDER — PREDNISONE 10 MG/1
30 TABLET ORAL DAILY
Qty: 3 TAB | Refills: 0 | Status: SHIPPED | OUTPATIENT
Start: 2017-04-25 | End: 2017-04-28

## 2017-04-25 RX ORDER — FAMOTIDINE 20 MG/1
20 TABLET, FILM COATED ORAL
Status: COMPLETED | OUTPATIENT
Start: 2017-04-25 | End: 2017-04-25

## 2017-04-25 RX ORDER — PREDNISONE 20 MG/1
60 TABLET ORAL
Status: COMPLETED | OUTPATIENT
Start: 2017-04-25 | End: 2017-04-25

## 2017-04-25 RX ORDER — FAMOTIDINE 20 MG/1
10 TABLET, FILM COATED ORAL DAILY
Qty: 3 TAB | Refills: 0 | Status: SHIPPED | OUTPATIENT
Start: 2017-04-25 | End: 2018-10-24

## 2017-04-25 RX ADMIN — PREDNISONE 60 MG: 20 TABLET ORAL at 03:20

## 2017-04-25 RX ADMIN — FAMOTIDINE 20 MG: 20 TABLET, FILM COATED ORAL at 03:20

## 2017-04-25 NOTE — LETTER
[address] 
[facility] EMERGENCY DEPT 
[address] [place] [address] [address] [id number] 
[phone] Work/School Note Date: 4/25/2017 To Whom It May concern: 
 
Lamar Sánchez was seen and treated today in the emergency room by the following provider(s): 
Attending Provider: Radha Bazzi MD.   
 
Lamar Sánchez may return to work on 4/26/17.  
 
Sincerely, 
 
 
 
 
Radha Bazzi MD

## 2017-04-25 NOTE — DISCHARGE INSTRUCTIONS
Allergic Reaction: Care Instructions  Your Care Instructions  An allergic reaction is an excessive response from your immune system to a medicine, chemical, food, insect bite, or other substance. A reaction can range from mild to life-threatening. Some people have a mild rash, hives, and itching or stomach cramps. In severe reactions, swelling of your tongue and throat can close up your airway so that you cannot breathe. Follow-up care is a key part of your treatment and safety. Be sure to make and go to all appointments, and call your doctor if you are having problems. It's also a good idea to know your test results and keep a list of the medicines you take. How can you care for yourself at home? · If you know what caused your allergic reaction, be sure to avoid it. Your allergy may become more severe each time you have a reaction. · Take an over-the-counter antihistamine, such as cetirizine (Zyrtec) or loratadine (Claritin), to treat mild symptoms. Read and follow directions on the label. Some antihistamines can make you feel sleepy. Do not give antihistamines to a child unless you have checked with your doctor first. Mild symptoms include sneezing or an itchy or runny nose; an itchy mouth; a few hives or mild itching; and mild nausea or stomach discomfort. · Do not scratch hives or a rash. Put a cold, moist towel on them or take cool baths to relieve itching. Put ice packs on hives, swelling, or insect stings for 10 to 15 minutes at a time. Put a thin cloth between the ice pack and your skin. Do not take hot baths or showers. They will make the itching worse. · Your doctor may prescribe a shot of epinephrine to carry with you in case you have a severe reaction. Learn how to give yourself the shot and keep it with you at all times. Make sure it is not . · Go to the emergency room every time you have a severe reaction, even if you have used your shot of epinephrine and are feeling better. Symptoms can come back after a shot. · Wear medical alert jewelry that lists your allergies. You can buy this at most drugstores. · If your child has a severe allergy, make sure that his or her teachers, babysitters, coaches, and other caregivers know about the allergy. They should have an epinephrine shot, know how and when to give it, and have a plan to take your child to the hospital.  When should you call for help? Give an epinephrine shot if:  · You think you are having a severe allergic reaction. · You have symptoms in more than one body area, such as mild nausea and an itchy mouth. After giving an epinephrine shot call 911, even if you feel better. Call 911 if:  · You have symptoms of a severe allergic reaction. These may include:  ¨ Sudden raised, red areas (hives) all over your body. ¨ Swelling of the throat, mouth, lips, or tongue. ¨ Trouble breathing. ¨ Passing out (losing consciousness). Or you may feel very lightheaded or suddenly feel weak, confused, or restless. · You have been given an epinephrine shot, even if you feel better. Call your doctor now or seek immediate medical care if:  · You have symptoms of an allergic reaction, such as:  ¨ A rash or hives (raised, red areas on the skin). ¨ Itching. ¨ Swelling. ¨ Belly pain, nausea, or vomiting. Watch closely for changes in your health, and be sure to contact your doctor if:  · You do not get better as expected. Where can you learn more? Go to http://dee-chuy.info/. Enter Q596 in the search box to learn more about \"Allergic Reaction: Care Instructions. \"  Current as of: February 12, 2016  Content Version: 11.2  © 9228-3948 [employer]. Care instructions adapted under license by [facility] (which disclaims liability or warranty for this information).  If you have questions about a medical condition or this instruction, always ask your healthcare professional. Tamra Jimenez disclaims any warranty or liability for your use of this information.

## 2017-04-25 NOTE — ED NOTES
Discharge instructions reviewed with pt and copy given along with RX by MD. All questions answered at this time. Pt left ambulatory to home, pt refused wheelchair.

## 2017-04-25 NOTE — ED PROVIDER NOTES
HPI Comments: Gila Menjivar is a 23 y.o. female, pmhx asthma, who presents ambulatory to the ED for evaluation of possible allergic reaction PTA this morning. Pt states she took Ibuprofen for a HA x 2300 yesterday evening. She reports a gradual onset of facial swelling, redness, and itching. Pt notes taking Benadryl with improvement of sxs PTA. She denies any hx of Ibuprofen allergies. Pt specifically denies any recent voice changes, SOB, difficulty swallowing, fever, chills, nausea, vomiting, diarrhea, or abd pain. PCP: Prabhjot Hernandez MD    PMHx: Significant for asthma  PSHx: Significant for ACL repair  Social Hx: -tobacco, -EtOH, -Illicit Drugs    There are no other complaints, changes, or physical findings at this time. The history is provided by the patient. Past Medical History:   Diagnosis Date    Asthma        Past Surgical History:   Procedure Laterality Date    HX ORTHOPAEDIC      ACL repaired         History reviewed. No pertinent family history. Social History     Social History    Marital status: SINGLE     Spouse name: N/A    Number of children: N/A    Years of education: N/A     Occupational History    Not on file. Social History Main Topics    Smoking status: Never Smoker    Smokeless tobacco: Not on file    Alcohol use No    Drug use: No    Sexual activity: Yes     Partners: Male     Birth control/ protection: Pill     Other Topics Concern    Not on file     Social History Narrative         ALLERGIES: Latex and Pcn [penicillins]    Review of Systems   Constitutional: Negative. Negative for fever. HENT: Positive for facial swelling. Negative for congestion, sore throat, trouble swallowing and voice change. Eyes: Negative. Respiratory: Negative. Negative for shortness of breath. Cardiovascular: Negative for chest pain. Gastrointestinal: Negative for abdominal pain, nausea and vomiting. Endocrine: Negative. Genitourinary: Negative. Negative for difficulty urinating, dysuria and hematuria. Musculoskeletal: Negative. Skin: Positive for color change (redness) and rash (itching). Allergic/Immunologic: Negative. Neurological: Negative. Psychiatric/Behavioral: Negative for suicidal ideas. All other systems reviewed and are negative. Vitals:    04/25/17 0200 04/25/17 0223   BP: 138/72    Pulse: 79    Resp: 18    Temp: 98.5 °F (36.9 °C)    SpO2: 100% 100%   Weight: 54.2 kg (119 lb 7.8 oz)    Height: 5' 8\" (1.727 m)             Physical Exam   Constitutional: She is oriented to person, place, and time. She appears well-developed and well-nourished. No distress. HENT:   Head: Normocephalic and atraumatic. Nose: Nose normal.   Mouth/Throat: Uvula is midline, oropharynx is clear and moist and mucous membranes are normal. No oral lesions. No trismus in the jaw. No uvula swelling. No oropharyngeal exudate, posterior oropharyngeal edema, posterior oropharyngeal erythema or tonsillar abscesses. Eyes: Conjunctivae and EOM are normal. No scleral icterus. Neck: Normal range of motion. No tracheal deviation present. Cardiovascular: Normal rate, regular rhythm, normal heart sounds and intact distal pulses. Exam reveals no friction rub. No murmur heard. Pulmonary/Chest: Effort normal and breath sounds normal. No stridor. No respiratory distress. She has no wheezes. She has no rales. Abdominal: Soft. Bowel sounds are normal. She exhibits no distension. There is no tenderness. There is no rebound. Musculoskeletal: Normal range of motion. She exhibits no tenderness. Neurological: She is alert and oriented to person, place, and time. No cranial nerve deficit. Skin: Skin is warm and dry. No rash noted. She is not diaphoretic. No erythema. Psychiatric: She has a normal mood and affect. Her behavior is normal. Judgment and thought content normal.   Nursing note and vitals reviewed.        MDM  Number of Diagnoses or Management Options  Acute allergic reaction, initial encounter:   Diagnosis management comments: DDX:  Acute allergic reaction, angio edema, urticaria    Plan:  Prednisone, famotidine    Impression:  Acute allergic reaction         Amount and/or Complexity of Data Reviewed  Review and summarize past medical records: yes    Patient Progress  Patient progress: stable      Procedures  I reviewed our electronic medical record system for any past medical records that were available that may contribute to the patients current condition, the nursing notes and and vital signs from today's visit    Nursing notes will be reviewed as they become available in realtime while the pt has been in the ED. Gilberto Joshi MD      3:20 AM  Progress note:  Pt noted to be feeling better, ready for discharge. Will follow up as instructed. All questions have been answered, pt voiced understanding and agreement with plan. If narcotics were prescribed, pt was advised not to drive or operate heavy machinery. If abx were prescribed, pt advised that diarrhea and rash are possible side effects of the medications. Specific return precautions provided as well as instructions to return to the ED should sx worsen at any time. Gilberto Joshi MD      MEDICATIONS GIVEN:  Medications   predniSONE (DELTASONE) tablet 60 mg (60 mg Oral Given 4/25/17 0320)   famotidine (PEPCID) tablet 20 mg (20 mg Oral Given 4/25/17 0320)       IMPRESSION:  1. Acute allergic reaction, initial encounter        PLAN:  1. Discharge Medication List as of 4/25/2017  3:23 AM      START taking these medications    Details   predniSONE (DELTASONE) 10 mg tablet Take 3 Tabs by mouth daily for 3 doses. , Normal, Disp-3 Tab, R-0      famotidine (PEPCID) 20 mg tablet Take 0.5 Tabs by mouth daily. , Normal, Disp-3 Tab, R-0           2.    Follow-up Information     Follow up With Details Comments Contact Hayden Pisano MD Schedule an appointment as soon as possible for a visit in 2 days  [address]  [phone]          Return to ED if worse     DISCHARGE NOTE:  3:22 AM  The patient's results have been reviewed with family and/or caregiver. They verbally convey their understanding and agreement of the patient's signs, symptoms, diagnosis, treatment, and prognosis. They additionally agree to follow up as recommended in the discharge instructions or to return to the Emergency Room should the patient's condition change prior to their follow-up appointment. The family and/or caregiver verbally agrees with the care-plan and all of their questions have been answered. The discharge instructions have also been provided to the them along with educational information regarding the patient's diagnosis and a list of reasons why the patient would want to return to the ER prior to their follow-up appointment should their condition change. Written by NELSON Baezibe, as dictated by Te Maki MD.         This note is prepared by Ian Crenshaw, acting as Scribe for MD Te David MD : The scribe's documentation has been prepared under my direction and personally reviewed by me in its entirety. I confirm that the note above accurately reflects all work, treatment, procedures, and medical decision making performed by me. This note will not be viewable in [address]

## 2017-05-10 ENCOUNTER — HOSPITAL ENCOUNTER (EMERGENCY)
Age: 22
Discharge: HOME | End: 2017-05-10
Attending: EMERGENCY MEDICINE
Payer: COMMERCIAL

## 2017-05-10 VITALS
HEIGHT: 68 IN | BODY MASS INDEX: 17.58 KG/M2 | OXYGEN SATURATION: 99 % | DIASTOLIC BLOOD PRESSURE: 61 MMHG | RESPIRATION RATE: 16 BRPM | TEMPERATURE: 98.4 F | SYSTOLIC BLOOD PRESSURE: 109 MMHG | WEIGHT: 116 LBS | HEART RATE: 77 BPM

## 2017-05-10 DIAGNOSIS — B97.89 VIRAL SINUSITIS: Primary | ICD-10-CM

## 2017-05-10 DIAGNOSIS — J32.9 VIRAL SINUSITIS: Primary | ICD-10-CM

## 2017-05-10 PROCEDURE — 99282 EMERGENCY DEPT VISIT SF MDM: CPT

## 2017-05-10 RX ORDER — FLUTICASONE PROPIONATE 50 MCG
2 SPRAY, SUSPENSION (ML) NASAL DAILY
Qty: 1 BOTTLE | Refills: 0 | Status: SHIPPED | OUTPATIENT
Start: 2017-05-10 | End: 2017-06-08

## 2017-05-10 RX ORDER — IBUPROFEN 800 MG/1
800 TABLET ORAL
Qty: 20 TAB | Refills: 0 | Status: SHIPPED | OUTPATIENT
Start: 2017-05-10 | End: 2017-05-17

## 2017-05-10 NOTE — LETTER
[facility] EMERGENCY DEPT 
[address] [facility] [address] [id number] [phone] Work/School Note Date: 5/10/2017 To Whom It May concern: 
 
Blu Phillips was seen and treated today in the emergency room by the following provider(s): 
Attending Provider: Alfreda Nicholson MD 
Physician Assistant: Nikia Meneses PA-C. Blu Phillips may return to work on 5/11/17. Sincerely, Nikia Meneses PA-C

## 2017-05-10 NOTE — ED PROVIDER NOTES
Patient is a 23 y.o. female presenting with general illness. The history is provided by the patient. Generalized Body Aches   This is a new (Pt endorses sick contacts at work and new onset nasal congestion, headache, sinus pain, GBA. ) problem. The current episode started 2 days ago. The problem occurs constantly. The problem has not changed since onset. Associated symptoms include headaches. Pertinent negatives include no chest pain, no abdominal pain and no shortness of breath. Treatments tried: flonase and ibuprofen. The treatment provided mild relief. Past Medical History:   Diagnosis Date    Asthma        Past Surgical History:   Procedure Laterality Date    HX ORTHOPAEDIC      ACL repaired         History reviewed. No pertinent family history. Social History     Social History    Marital status: SINGLE     Spouse name: N/A    Number of children: N/A    Years of education: N/A     Occupational History    Not on file. Social History Main Topics    Smoking status: Never Smoker    Smokeless tobacco: Not on file    Alcohol use No    Drug use: No    Sexual activity: Yes     Partners: Male     Birth control/ protection: Pill     Other Topics Concern    Not on file     Social History Narrative         ALLERGIES: Latex and Pcn [penicillins]    Review of Systems   Constitutional: Negative for activity change, chills, fatigue and fever. HENT: Positive for congestion, postnasal drip, rhinorrhea and sinus pressure. Negative for drooling, ear discharge, ear pain, facial swelling, sneezing, sore throat and trouble swallowing. Eyes: Negative for photophobia, pain, discharge, redness, itching and visual disturbance. Respiratory: Negative for cough, chest tightness, shortness of breath, wheezing and stridor. Cardiovascular: Negative for chest pain. Gastrointestinal: Negative for abdominal pain, constipation, diarrhea, nausea and vomiting. Genitourinary: Negative.     Musculoskeletal: Negative. Negative for myalgias and neck pain. Skin: Negative. Negative for rash. Allergic/Immunologic: Positive for environmental allergies. Neurological: Positive for headaches. Psychiatric/Behavioral: Negative. Negative for confusion. Vitals:    05/10/17 1240   BP: 109/61   Pulse: 77   Resp: 16   Temp: 98.4 °F (36.9 °C)   SpO2: 99%   Weight: 52.6 kg (116 lb)   Height: 5' 8\" (1.727 m)            Physical Exam   Constitutional: She is oriented to person, place, and time. She appears well-developed and well-nourished. No distress. HENT:   Head: Normocephalic and atraumatic. Right Ear: Hearing, tympanic membrane, external ear and ear canal normal.   Left Ear: Hearing, tympanic membrane, external ear and ear canal normal.   Nose: Mucosal edema and rhinorrhea present. Right sinus exhibits maxillary sinus tenderness and frontal sinus tenderness. Left sinus exhibits maxillary sinus tenderness and frontal sinus tenderness. Mouth/Throat: Uvula is midline, oropharynx is clear and moist and mucous membranes are normal. No oropharyngeal exudate. Eyes: Conjunctivae and EOM are normal. Pupils are equal, round, and reactive to light. Neck: Normal range of motion. Cardiovascular: Normal rate, regular rhythm, normal heart sounds and intact distal pulses. Pulmonary/Chest: Effort normal. No respiratory distress. She has no wheezes. She has no rales. She exhibits no tenderness. Abdominal: Soft. There is no tenderness. Neurological: She is alert and oriented to person, place, and time. No cranial nerve deficit. Skin: Skin is warm and dry. She is not diaphoretic. Psychiatric: She has a normal mood and affect. Her behavior is normal. Judgment and thought content normal.   Nursing note and vitals reviewed.        MDM  Number of Diagnoses or Management Options  Viral sinusitis:   Diagnosis management comments: DDx; allergic vs viral va bacterial sinusitis, AOM, URI    LABORATORY TESTS:  No results found for this or any previous visit (from the past 12 hour(s)). IMAGING RESULTS:  No orders to display    MEDICATIONS GIVEN:  Medications - No data to display    IMPRESSION:  Viral sinusitis  (primary encounter diagnosis)    PLAN:  1. Current Discharge Medication List    START taking these medications    ibuprofen (MOTRIN) 800 mg tablet  Take 1 Tab by mouth every six (6) hours as needed for Pain for up to 7 days. Qty: 20 Tab Refills: 0    fluticasone (FLONASE) 50 mcg/actuation nasal spray  2 Sprays by Both Nostrils route daily. Qty: 1 Bottle Refills: 0    loratadine-pseudoephedrine (CLARITIN-D 12 HOUR) 5-120 mg per tablet  Take 1 Tab by mouth two (2) times a day. Qty: 20 Tab Refills: 0      CONTINUE these medications which have NOT CHANGED    famotidine (PEPCID) 20 mg tablet  Take 0.5 Tabs by mouth daily. Qty: 3 Tab Refills: 0        2. Follow-up Information     Follow up With Details Comments Zari Chavarria MD Schedule an appointment as soon as possible   for a visit in 1 week As needed, If symptoms worsen [address] [facility] [phone]        Return to ED if worse               Patient Progress  Patient progress: stable    ED Course       Procedures      1:17 PM  I have discussed with patient their diagnosis, treatment, and follow up plan. The patient agrees to follow up as outlined in discharge paperwork and also to return to the ED with any worsening.  Lena Keller PA-C

## 2017-05-10 NOTE — DISCHARGE INSTRUCTIONS
Sinusitis: Care Instructions  Your Care Instructions    Sinusitis is an infection of the lining of the sinus cavities in your head. Sinusitis often follows a cold. It causes pain and pressure in your head and face. In most cases, sinusitis gets better on its own in 1 to 2 weeks. But some mild symptoms may last for several weeks. Sometimes antibiotics are needed. Follow-up care is a key part of your treatment and safety. Be sure to make and go to all appointments, and call your doctor if you are having problems. It's also a good idea to know your test results and keep a list of the medicines you take. How can you care for yourself at home? · Take an over-the-counter pain medicine, such as acetaminophen (Tylenol), ibuprofen (Advil, Motrin), or naproxen (Aleve). Read and follow all instructions on the label. · If the doctor prescribed antibiotics, take them as directed. Do not stop taking them just because you feel better. You need to take the full course of antibiotics. · Be careful when taking over-the-counter cold or flu medicines and Tylenol at the same time. Many of these medicines have acetaminophen, which is Tylenol. Read the labels to make sure that you are not taking more than the recommended dose. Too much acetaminophen (Tylenol) can be harmful. · Breathe warm, moist air from a steamy shower, a hot bath, or a sink filled with hot water. Avoid cold, dry air. Using a humidifier in your home may help. Follow the directions for cleaning the machine. · Use saline (saltwater) nasal washes to help keep your nasal passages open and wash out mucus and bacteria. You can buy saline nose drops at a grocery store or drugstore. Or you can make your own at home by adding 1 teaspoon of salt and 1 teaspoon of baking soda to 2 cups of distilled water. If you make your own, fill a bulb syringe with the solution, insert the tip into your nostril, and squeeze gently. Juan Deshpande your nose.   · Put a hot, wet towel or a warm gel pack on your face 3 or 4 times a day for 5 to 10 minutes each time. · Try a decongestant nasal spray like oxymetazoline (Afrin). Do not use it for more than 3 days in a row. Using it for more than 3 days can make your congestion worse. When should you call for help? Call your doctor now or seek immediate medical care if:  · You have new or worse swelling or redness in your face or around your eyes. · You have a new or higher fever. Watch closely for changes in your health, and be sure to contact your doctor if:  · You have new or worse facial pain. · The mucus from your nose becomes thicker (like pus) or has new blood in it. · You are not getting better as expected. Where can you learn more? Go to http://dee-chuy.info/. Enter Y252 in the search box to learn more about \"Sinusitis: Care Instructions. \"  Current as of: July 29, 2016  Content Version: 11.2  © 5593-1113 [employer]. Care instructions adapted under license by [employer] (which disclaims liability or warranty for this information). If you have questions about a medical condition or this instruction, always ask your healthcare professional. [place] [address] any warranty or liability for your use of this information.

## 2017-06-08 ENCOUNTER — HOSPITAL ENCOUNTER (EMERGENCY)
Age: 22
Discharge: HOME | End: 2017-06-08
Attending: EMERGENCY MEDICINE
Payer: COMMERCIAL

## 2017-06-08 VITALS
RESPIRATION RATE: 18 BRPM | HEART RATE: 87 BPM | TEMPERATURE: 99.7 F | SYSTOLIC BLOOD PRESSURE: 120 MMHG | HEIGHT: 68 IN | DIASTOLIC BLOOD PRESSURE: 73 MMHG | WEIGHT: 117 LBS | BODY MASS INDEX: 17.73 KG/M2 | OXYGEN SATURATION: 100 %

## 2017-06-08 DIAGNOSIS — J01.00 ACUTE MAXILLARY SINUSITIS, RECURRENCE NOT SPECIFIED: Primary | ICD-10-CM

## 2017-06-08 LAB — DEPRECATED S PYO AG THROAT QL EIA: NEGATIVE

## 2017-06-08 PROCEDURE — 74011250637 HC RX REV CODE- 250/637: Performed by: PHYSICIAN ASSISTANT

## 2017-06-08 PROCEDURE — 87070 CULTURE OTHR SPECIMN AEROBIC: CPT | Performed by: EMERGENCY MEDICINE

## 2017-06-08 PROCEDURE — 99283 EMERGENCY DEPT VISIT LOW MDM: CPT

## 2017-06-08 PROCEDURE — 87880 STREP A ASSAY W/OPTIC: CPT | Performed by: PHYSICIAN ASSISTANT

## 2017-06-08 RX ORDER — IBUPROFEN 600 MG/1
600 TABLET ORAL
Qty: 20 TAB | Refills: 0 | Status: SHIPPED | OUTPATIENT
Start: 2017-06-08 | End: 2018-10-24

## 2017-06-08 RX ORDER — IBUPROFEN 600 MG/1
600 TABLET ORAL
Status: COMPLETED | OUTPATIENT
Start: 2017-06-08 | End: 2017-06-08

## 2017-06-08 RX ORDER — FLUTICASONE PROPIONATE 50 MCG
2 SPRAY, SUSPENSION (ML) NASAL DAILY
Qty: 1 BOTTLE | Refills: 0 | Status: SHIPPED | OUTPATIENT
Start: 2017-06-08 | End: 2018-10-24

## 2017-06-08 RX ADMIN — IBUPROFEN 600 MG: 600 TABLET ORAL at 13:35

## 2017-06-08 NOTE — ED PROVIDER NOTES
Patient is a 23 y.o. female presenting with sore throat and general illness. The history is provided by the patient. Sore Throat    This is a new problem. The current episode started 2 days ago. The problem has not changed since onset. There has been no fever. Associated symptoms include congestion and headaches. Pertinent negatives include no diarrhea, no vomiting, no drooling, no ear discharge, no ear pain, no plugged ear sensation, no shortness of breath, no stridor, no swollen glands, no trouble swallowing, no stiff neck and no cough. She has had no exposure to strep. Treatments tried: theraflu. The treatment provided no relief. Generalized Body Aches   This is a new problem. The current episode started 3 to 5 hours ago. The problem occurs constantly. The problem has not changed since onset. Associated symptoms include headaches. Pertinent negatives include no chest pain, no abdominal pain and no shortness of breath. Nothing aggravates the symptoms. She has tried nothing for the symptoms. Past Medical History:   Diagnosis Date    Asthma        Past Surgical History:   Procedure Laterality Date    HX ORTHOPAEDIC      ACL repaired         History reviewed. No pertinent family history. Social History     Social History    Marital status: SINGLE     Spouse name: N/A    Number of children: N/A    Years of education: N/A     Occupational History    Not on file. Social History Main Topics    Smoking status: Never Smoker    Smokeless tobacco: Not on file    Alcohol use No    Drug use: No    Sexual activity: Yes     Partners: Male     Birth control/ protection: Pill     Other Topics Concern    Not on file     Social History Narrative         ALLERGIES: Latex and Pcn [penicillins]    Review of Systems   Constitutional: Negative for activity change, chills, fatigue and fever. HENT: Positive for congestion, postnasal drip, rhinorrhea, sinus pressure and sore throat.  Negative for drooling, ear discharge, ear pain, facial swelling, sneezing and trouble swallowing. Eyes: Negative for photophobia, pain, discharge and visual disturbance. Respiratory: Negative for cough, chest tightness, shortness of breath and stridor. Cardiovascular: Negative for chest pain. Gastrointestinal: Negative for abdominal pain, constipation, diarrhea, nausea and vomiting. Genitourinary: Negative. Musculoskeletal: Negative. Negative for myalgias and neck pain. Skin: Negative. Negative for rash. Neurological: Positive for headaches. Psychiatric/Behavioral: Negative. Vitals:    06/08/17 1317   BP: 120/73   Pulse: 87   Resp: 18   Temp: 99.7 °F (37.6 °C)   SpO2: 100%   Weight: 53.1 kg (117 lb)   Height: 5' 8\" (1.727 m)            Physical Exam   Constitutional: She is oriented to person, place, and time. She appears well-developed and well-nourished. No distress. HENT:   Head: Normocephalic and atraumatic. Right Ear: Hearing, tympanic membrane, external ear and ear canal normal.   Left Ear: Hearing, tympanic membrane, external ear and ear canal normal.   Nose: Mucosal edema and rhinorrhea present. Right sinus exhibits maxillary sinus tenderness. Right sinus exhibits no frontal sinus tenderness. Left sinus exhibits maxillary sinus tenderness. Left sinus exhibits no frontal sinus tenderness. Mouth/Throat: Uvula is midline and mucous membranes are normal. No oral lesions. No trismus in the jaw. No uvula swelling. Posterior oropharyngeal erythema present. No oropharyngeal exudate, posterior oropharyngeal edema or tonsillar abscesses. Eyes: Conjunctivae and EOM are normal. Pupils are equal, round, and reactive to light. Right eye exhibits no discharge. Left eye exhibits no discharge. No scleral icterus. Neck: Normal range of motion. Neck supple. Cardiovascular: Normal rate, regular rhythm, normal heart sounds and intact distal pulses. Exam reveals no gallop and no friction rub.     No murmur heard.  Pulmonary/Chest: Effort normal and breath sounds normal. No accessory muscle usage. No respiratory distress. She has no decreased breath sounds. She has no wheezes. She has no rhonchi. She has no rales. She exhibits no tenderness. Abdominal: Soft. Bowel sounds are normal. She exhibits no distension and no mass. There is no tenderness. There is no rebound and no guarding. Musculoskeletal: Normal range of motion. Lymphadenopathy:     She has no cervical adenopathy. Neurological: She is alert and oriented to person, place, and time. No cranial nerve deficit. Skin: Skin is warm and dry. No rash noted. She is not diaphoretic. Psychiatric: She has a normal mood and affect. Her behavior is normal. Judgment and thought content normal.   Nursing note and vitals reviewed. MDM  Number of Diagnoses or Management Options  Acute maxillary sinusitis, recurrence not specified:   Diagnosis management comments: DDx: strep, uri, sinusitis, allergic rhinitis    LABORATORY TESTS:  Recent Results (from the past 12 hour(s))  -STREP AG SCREEN, GROUP A  Collection Time: 06/08/17  1:36 PM       Result                                            Value                         Ref Range                       Group A Strep Ag ID                               NEGATIVE                      NEG                          IMAGING RESULTS:  No orders to display    MEDICATIONS GIVEN:  Medications  ibuprofen (MOTRIN) tablet 600 mg (600 mg Oral Given 6/8/17 1335)    IMPRESSION:  Acute maxillary sinusitis, recurrence not specified  (primary encounter diagnosis)    PLAN:  1. Current Discharge Medication List    START taking these medications    ibuprofen (MOTRIN) 600 mg tablet  Take 1 Tab by mouth every six (6) hours as needed for Pain. Qty: 20 Tab Refills: 0      CONTINUE these medications which have CHANGED    fluticasone (FLONASE) 50 mcg/actuation nasal spray  2 Sprays by Both Nostrils route daily.   Qty: 1 Bottle Refills: 0    loratadine-pseudoephedrine (CLARITIN-D 12 HOUR) 5-120 mg per tablet  Take 1 Tab by mouth two (2) times a day. Qty: 20 Tab Refills: 0      CONTINUE these medications which have NOT CHANGED    famotidine (PEPCID) 20 mg tablet  Take 0.5 Tabs by mouth daily. Qty: 3 Tab Refills: 0        2. Follow-up Information     Follow up With Details Comments Zari Chavarria MD Schedule an appointment as soon as possible   for a visit in 1 week As needed, If symptoms worsen [address] [facility] [address]        Return to ED if worse                  Amount and/or Complexity of Data Reviewed  Clinical lab tests: ordered and reviewed  Tests in the medicine section of CPT®: ordered and reviewed    Patient Progress  Patient progress: stable    ED Course       Procedures    1:55 PM  I have discussed with patient their diagnosis, treatment, and follow up plan. The patient agrees to follow up as outlined in discharge paperwork and also to return to the ED with any worsening.  Haile Emery PA-C

## 2017-06-08 NOTE — DISCHARGE INSTRUCTIONS
Sinusitis: Care Instructions  Your Care Instructions    Sinusitis is an infection of the lining of the sinus cavities in your head. Sinusitis often follows a cold. It causes pain and pressure in your head and face. In most cases, sinusitis gets better on its own in 1 to 2 weeks. But some mild symptoms may last for several weeks. Sometimes antibiotics are needed. Follow-up care is a key part of your treatment and safety. Be sure to make and go to all appointments, and call your doctor if you are having problems. It's also a good idea to know your test results and keep a list of the medicines you take. How can you care for yourself at home? · Take an over-the-counter pain medicine, such as acetaminophen (Tylenol), ibuprofen (Advil, Motrin), or naproxen (Aleve). Read and follow all instructions on the label. · If the doctor prescribed antibiotics, take them as directed. Do not stop taking them just because you feel better. You need to take the full course of antibiotics. · Be careful when taking over-the-counter cold or flu medicines and Tylenol at the same time. Many of these medicines have acetaminophen, which is Tylenol. Read the labels to make sure that you are not taking more than the recommended dose. Too much acetaminophen (Tylenol) can be harmful. · Breathe warm, moist air from a steamy shower, a hot bath, or a sink filled with hot water. Avoid cold, dry air. Using a humidifier in your home may help. Follow the directions for cleaning the machine. · Use saline (saltwater) nasal washes to help keep your nasal passages open and wash out mucus and bacteria. You can buy saline nose drops at a grocery store or drugstore. Or you can make your own at home by adding 1 teaspoon of salt and 1 teaspoon of baking soda to 2 cups of distilled water. If you make your own, fill a bulb syringe with the solution, insert the tip into your nostril, and squeeze gently. Dilan Ramirez your nose.   · Put a hot, wet towel or a warm gel pack on your face 3 or 4 times a day for 5 to 10 minutes each time. · Try a decongestant nasal spray like oxymetazoline (Afrin). Do not use it for more than 3 days in a row. Using it for more than 3 days can make your congestion worse. When should you call for help? Call your doctor now or seek immediate medical care if:  · You have new or worse swelling or redness in your face or around your eyes. · You have a new or higher fever. Watch closely for changes in your health, and be sure to contact your doctor if:  · You have new or worse facial pain. · The mucus from your nose becomes thicker (like pus) or has new blood in it. · You are not getting better as expected. Where can you learn more? Go to http://dee-chuy.info/. Enter N000 in the search box to learn more about \"Sinusitis: Care Instructions. \"  Current as of: July 29, 2016  Content Version: 11.2  © 2144-8855 [employer]. Care instructions adapted under license by [employer] (which disclaims liability or warranty for this information). If you have questions about a medical condition or this instruction, always ask your healthcare professional. [place] [address] any warranty or liability for your use of this information. Saline Nasal Washes: Care Instructions  Your Care Instructions  Saline nasal washes help keep the nasal passages open by washing out thick or dried mucus. This simple remedy can help relieve symptoms of allergies, sinusitis, and colds. It also can make the nose feel more comfortable by keeping the mucous membranes moist. You may notice a little burning sensation in your nose the first few times you use the solution, but this usually gets better in a few days. Follow-up care is a key part of your treatment and safety. Be sure to make and go to all appointments, and call your doctor if you are having problems.  It's also a good idea to know your test results and keep a list of the medicines you take. How can you care for yourself at home? · You can buy premixed saline solution in a squeeze bottle or other sinus rinse products at a drugstore. Read and follow the instructions on the label. · You also can make your own saline solution by adding 1 teaspoon of salt and 1 teaspoon of baking soda to 2 cups of distilled water. · If you use a homemade solution, pour a small amount into a clean bowl. Using a rubber bulb syringe, squeeze the syringe and place the tip in the salt water. Pull a small amount of the salt water into the syringe by relaxing your hand. · Sit down with your head tilted slightly back. Do not lie down. Put the tip of the bulb syringe or the squeeze bottle a little way into one of your nostrils. Gently drip or squirt a few drops into the nostril. Repeat with the other nostril. Some sneezing and gagging are normal at first.  · Gently blow your nose. · Wipe the syringe or bottle tip clean after each use. · Repeat this 2 or 3 times a day. · Use nasal washes gently if you have nosebleeds often. When should you call for help? Watch closely for changes in your health, and be sure to contact your doctor if:  · You often get nosebleeds. · You have problems doing the nasal washes. Where can you learn more? Go to http://dee-chuy.info/. Enter [id number] in the search box to learn more about \"Saline Nasal Washes: Care Instructions. \"  Current as of: July 29, 2016  Content Version: 11.2  © 0385-9583 [employer]. Care instructions adapted under license by [employer] (which disclaims liability or warranty for this information). If you have questions about a medical condition or this instruction, always ask your healthcare professional. [address] any warranty or liability for your use of this information.

## 2017-06-10 LAB
BACTERIA SPEC CULT: NORMAL
SERVICE CMNT-IMP: NORMAL

## 2017-07-13 ENCOUNTER — HOSPITAL ENCOUNTER (EMERGENCY)
Age: 22
Discharge: HOME | End: 2017-07-14
Attending: EMERGENCY MEDICINE
Payer: COMMERCIAL

## 2017-07-13 ENCOUNTER — APPOINTMENT (OUTPATIENT)
Dept: GENERAL RADIOLOGY | Age: 22
End: 2017-07-13
Attending: EMERGENCY MEDICINE
Payer: COMMERCIAL

## 2017-07-13 DIAGNOSIS — R07.89 LEFT-SIDED CHEST WALL PAIN: Primary | ICD-10-CM

## 2017-07-13 LAB — D DIMER PPP FEU-MCNC: <0.17 MG/L FEU (ref 0–0.65)

## 2017-07-13 PROCEDURE — 36415 COLL VENOUS BLD VENIPUNCTURE: CPT | Performed by: EMERGENCY MEDICINE

## 2017-07-13 PROCEDURE — 93005 ELECTROCARDIOGRAM TRACING: CPT

## 2017-07-13 PROCEDURE — 85379 FIBRIN DEGRADATION QUANT: CPT | Performed by: EMERGENCY MEDICINE

## 2017-07-13 PROCEDURE — 71020 XR CHEST PA LAT: CPT

## 2017-07-13 PROCEDURE — 99283 EMERGENCY DEPT VISIT LOW MDM: CPT

## 2017-07-14 VITALS
WEIGHT: 119 LBS | BODY MASS INDEX: 18.04 KG/M2 | HEIGHT: 68 IN | TEMPERATURE: 98.1 F | SYSTOLIC BLOOD PRESSURE: 110 MMHG | OXYGEN SATURATION: 100 % | DIASTOLIC BLOOD PRESSURE: 74 MMHG | RESPIRATION RATE: 18 BRPM | HEART RATE: 67 BPM

## 2017-07-14 RX ORDER — NAPROXEN 500 MG/1
500 TABLET ORAL 2 TIMES DAILY WITH MEALS
Qty: 20 TAB | Refills: 0 | Status: SHIPPED | OUTPATIENT
Start: 2017-07-14 | End: 2017-07-24

## 2017-07-14 NOTE — DISCHARGE INSTRUCTIONS
Musculoskeletal Chest Pain: Care Instructions  Your Care Instructions  Chest pain is not always a sign that something is wrong with your heart or that you have another serious problem. The doctor thinks your chest pain is caused by strained muscles or ligaments, inflamed chest cartilage, or another problem in your chest, rather than by your heart. You may need more tests to find the cause of your chest pain. Follow-up care is a key part of your treatment and safety. Be sure to make and go to all appointments, and call your doctor if you are having problems. Its also a good idea to know your test results and keep a list of the medicines you take. How can you care for yourself at home? · Take pain medicines exactly as directed. ¨ If the doctor gave you a prescription medicine for pain, take it as prescribed. ¨ If you are not taking a prescription pain medicine, ask your doctor if you can take an over-the-counter medicine. · Rest and protect the sore area. · Stop, change, or take a break from any activity that may be causing your pain or soreness. · Put ice or a cold pack on the sore area for 10 to 20 minutes at a time. Try to do this every 1 to 2 hours for the next 3 days (when you are awake) or until the swelling goes down. Put a thin cloth between the ice and your skin. · After 2 or 3 days, apply a heating pad set on low or a warm cloth to the area that hurts. Some doctors suggest that you go back and forth between hot and cold. · Do not wrap or tape your ribs for support. This may cause you to take smaller breaths, which could increase your risk of lung problems. · Mentholated creams such as Bengay or Icy Hot may soothe sore muscles. Follow the instructions on the package. · Follow your doctor's instructions for exercising. · Gentle stretching and massage may help you get better faster. Stretch slowly to the point just before pain begins, and hold the stretch for at least 15 to 30 seconds.  Do this 3 or 4 times a day. Stretch just after you have applied heat. · As your pain gets better, slowly return to your normal activities. Any increased pain may be a sign that you need to rest a while longer. When should you call for help? Call 911 anytime you think you may need emergency care. For example, call if:  · You have chest pain or pressure. This may occur with:  ¨ Sweating. ¨ Shortness of breath. ¨ Nausea or vomiting. ¨ Pain that spreads from the chest to the neck, jaw, or one or both shoulders or arms. ¨ Dizziness or lightheadedness. ¨ A fast or uneven pulse. After calling 911, chew 1 adult-strength aspirin. Wait for an ambulance. Do not try to drive yourself. · You have sudden chest pain and shortness of breath, or you cough up blood. Call your doctor now or seek immediate medical care if:  · You have any trouble breathing. · Your chest pain gets worse. · Your chest pain occurs consistently with exercise and is relieved by rest.  Watch closely for changes in your health, and be sure to contact your doctor if:  · Your chest pain does not get better after 1 week. Where can you learn more? Go to http://dee-chuy.info/. Enter V293 in the search box to learn more about \"Musculoskeletal Chest Pain: Care Instructions. \"  Current as of: March 20, 2017  Content Version: 11.3  © 0262-7651 [employer]. Care instructions adapted under license by [employer] (which disclaims liability or warranty for this information). If you have questions about a medical condition or this instruction, always ask your healthcare professional. [place] [address] any warranty or liability for your use of this information.

## 2017-07-14 NOTE — ED TRIAGE NOTES
Pt arrived to ED ambulatory with c/o mid sternal chest pain and sob X 2 days. Pt reports chest pain is getting worse and tried albuterol inhaler w/o relief. Pt is alert and orientated X 4; skin is intact; lungs are clear; pt breaths well on room air; Pt is in no acute distress. Will continue to monitor.

## 2017-07-14 NOTE — ED NOTES
Emergency Department Nursing Plan of Care       The Nursing Plan of Care is developed from the Nursing assessment and Emergency Department Attending provider initial evaluation. The plan of care may be reviewed in the ED Provider note.     The Plan of Care was developed with the following considerations:   Patient / Family readiness to learn indicated by:verbalized understanding  Persons(s) to be included in education: patient  Barriers to Learning/Limitations:No    [address], RN    7/13/2017   10:36 PM

## 2017-07-14 NOTE — ED NOTES
Patient  given copy of dc instructions and 1 script(s). Patient  verbalized understanding of instructions and script (s). Patient given a current medication reconciliation form and verbalized understanding of their medications. Patient  verbalized understanding of the importance of discussing medications with  his or her physician or clinic they will be following up with. Patient alert and oriented and in no acute distress. Patient discharged home ambulatory.

## 2017-07-14 NOTE — ED PROVIDER NOTES
HPI Comments: [place], 23 y.o. Female with PMHx of Asthma, presents ambulatory to [facility] ED with cc of shortness of breath and left-sided chest pain x2 day. She reports the CP is worse with deep breaths. She rates her pain currently as 8/10. She states her sxs feel like asthma but have never lasted this long. She used Albuterol this morning without relief. She also reports nausea this morning, possibly due to the heat outside. She denies cough, any chest injury, or heavy lifting. She denies smoking, or taking birth control/hormone therapy. She denies a hx of DVT or PE.    PCP: Patsy Tubbs MD    Social history significant for: - Tobacco, - EtOH, - Illicit Drug Use  Surgical history significant for: ACL reconstruction    There are no other complaints, changes, or physical findings at this time. The history is provided by the patient. Past Medical History:   Diagnosis Date    Asthma        Past Surgical History:   Procedure Laterality Date    HX ORTHOPAEDIC      ACL repaired         No family history on file. Social History     Social History    Marital status: SINGLE     Spouse name: N/A    Number of children: N/A    Years of education: N/A     Occupational History    Not on file. Social History Main Topics    Smoking status: Never Smoker    Smokeless tobacco: Not on file    Alcohol use No    Drug use: No    Sexual activity: Yes     Partners: Male     Birth control/ protection: Pill     Other Topics Concern    Not on file     Social History Narrative         ALLERGIES: Latex and Pcn [penicillins]    Review of Systems   Constitutional: Negative for chills and fever. HENT: Negative for congestion, rhinorrhea, sneezing and sore throat. Eyes: Negative for redness and visual disturbance. Respiratory: Positive for shortness of breath. Negative for cough. Cardiovascular: Positive for chest pain. Negative for leg swelling. Gastrointestinal: Positive for nausea. Negative for abdominal pain and vomiting. Genitourinary: Negative for difficulty urinating and frequency. Musculoskeletal: Negative for back pain, myalgias and neck stiffness. Skin: Negative for rash. Neurological: Negative for dizziness, syncope, weakness and headaches. Hematological: Negative for adenopathy. Vitals:    07/13/17 2158   BP: 113/72   Pulse: 74   Resp: 20   Temp: 97.9 °F (36.6 °C)   SpO2: 100%   Weight: 54 kg (119 lb)   Height: 5' 8\" (1.727 m)            Physical Exam   Constitutional: She is oriented to person, place, and time. She appears well-developed and well-nourished. HENT:   Head: Normocephalic. Mouth/Throat: Oropharynx is clear and moist.   Eyes: Conjunctivae and EOM are normal. Pupils are equal, round, and reactive to light. Neck: Normal range of motion. Neck supple. Cardiovascular: Normal rate, regular rhythm, normal heart sounds and intact distal pulses. Pulmonary/Chest: Effort normal and breath sounds normal. She has no wheezes. Abdominal: Soft. Bowel sounds are normal. She exhibits no distension. There is no rebound. Musculoskeletal: Normal range of motion. She exhibits no edema or deformity. Neurological: She is alert and oriented to person, place, and time. Skin: Skin is warm and dry. Psychiatric: She has a normal mood and affect.  Her behavior is normal. Judgment and thought content normal.        MDM  Number of Diagnoses or Management Options  Diagnosis management comments: DDx: PE, cardiac arrhythmia, chest wall strain       Amount and/or Complexity of Data Reviewed  Clinical lab tests: ordered and reviewed  Tests in the radiology section of CPT®: ordered and reviewed  Tests in the medicine section of CPT®: ordered and reviewed  Review and summarize past medical records: yes  Independent visualization of images, tracings, or specimens: yes      ED Course     EKG: normal EKG, normal sinus rhythm rate 76 bpm,  Read by Cristobal Du MD    LABORATORY TESTS:  Recent Results (from the past 12 hour(s))   D DIMER    Collection Time: 07/13/17 10:44 PM   Result Value Ref Range    D-dimer <0.17 0.00 - 0.65 mg/L FEU       IMAGING RESULTS:  Xr Chest Pa Lat    Result Date: 7/14/2017  IMPRESSION: Normal chest.     Patient informed of results of workup and is comfortable with discharge to home to follow up with PCP. They are instructed to return as needed for worsening condition. MEDICATIONS GIVEN:  Medications - No data to display    IMPRESSION:  1. Left-sided chest wall pain        PLAN:  1. Current Discharge Medication List      START taking these medications    Details   naproxen (NAPROSYN) 500 mg tablet Take 1 Tab by mouth two (2) times daily (with meals) for 10 days. Qty: 20 Tab, Refills: 0           2.    Follow-up Information     Follow up With Details Comments Zari Chavarria MD Call  [address] [address] [phone]          Return to ED if worse             Procedures

## 2017-07-15 LAB
ATRIAL RATE: 76 BPM
CALCULATED P AXIS, ECG09: 69 DEGREES
CALCULATED R AXIS, ECG10: 71 DEGREES
CALCULATED T AXIS, ECG11: 45 DEGREES
DIAGNOSIS, 93000: NORMAL
P-R INTERVAL, ECG05: 166 MS
Q-T INTERVAL, ECG07: 376 MS
QRS DURATION, ECG06: 74 MS
QTC CALCULATION (BEZET), ECG08: 423 MS
VENTRICULAR RATE, ECG03: 76 BPM

## 2017-07-17 ENCOUNTER — HOSPITAL ENCOUNTER (EMERGENCY)
Age: 22
Discharge: LEFT BEFORE DISCHARGE | End: 2017-07-17
Attending: EMERGENCY MEDICINE
Payer: COMMERCIAL

## 2017-07-17 VITALS
SYSTOLIC BLOOD PRESSURE: 125 MMHG | DIASTOLIC BLOOD PRESSURE: 75 MMHG | TEMPERATURE: 99.6 F | RESPIRATION RATE: 16 BRPM | HEIGHT: 68 IN | OXYGEN SATURATION: 100 % | HEART RATE: 97 BPM

## 2017-07-17 PROCEDURE — 75810000275 HC EMERGENCY DEPT VISIT NO LEVEL OF CARE

## 2017-07-18 NOTE — ED NOTES
Continues without any answer in waiting room. Patient removed from tracking board as Left without being seen after triage.

## 2017-11-15 ENCOUNTER — HOSPITAL ENCOUNTER (EMERGENCY)
Age: 22
Discharge: HOME | End: 2017-11-15
Attending: EMERGENCY MEDICINE
Payer: COMMERCIAL

## 2017-11-15 VITALS
SYSTOLIC BLOOD PRESSURE: 104 MMHG | BODY MASS INDEX: 18.19 KG/M2 | DIASTOLIC BLOOD PRESSURE: 61 MMHG | HEIGHT: 68 IN | TEMPERATURE: 98.2 F | RESPIRATION RATE: 12 BRPM | HEART RATE: 82 BPM | OXYGEN SATURATION: 100 % | WEIGHT: 120 LBS

## 2017-11-15 DIAGNOSIS — N30.00 ACUTE CYSTITIS WITHOUT HEMATURIA: ICD-10-CM

## 2017-11-15 DIAGNOSIS — N76.0 BV (BACTERIAL VAGINOSIS): Primary | ICD-10-CM

## 2017-11-15 DIAGNOSIS — B96.89 BV (BACTERIAL VAGINOSIS): Primary | ICD-10-CM

## 2017-11-15 LAB
APPEARANCE UR: CLEAR
BACTERIA URNS QL MICRO: ABNORMAL /HPF
BILIRUB UR QL: NEGATIVE
CLUE CELLS VAG QL WET PREP: NORMAL
COLOR UR: ABNORMAL
EPITH CASTS URNS QL MICRO: ABNORMAL /LPF
GLUCOSE UR STRIP.AUTO-MCNC: NEGATIVE MG/DL
HCG UR QL: NEGATIVE
HGB UR QL STRIP: NEGATIVE
KETONES UR QL STRIP.AUTO: NEGATIVE MG/DL
KOH PREP SPEC: NORMAL
LEUKOCYTE ESTERASE UR QL STRIP.AUTO: ABNORMAL
NITRITE UR QL STRIP.AUTO: NEGATIVE
PH UR STRIP: 6 [PH] (ref 5–8)
PROT UR STRIP-MCNC: NEGATIVE MG/DL
RBC #/AREA URNS HPF: ABNORMAL /HPF (ref 0–5)
SERVICE CMNT-IMP: NORMAL
SP GR UR REFRACTOMETRY: 1.02 (ref 1–1.03)
T VAGINALIS VAG QL WET PREP: NORMAL
UA: UC IF INDICATED,UAUC: ABNORMAL
UROBILINOGEN UR QL STRIP.AUTO: 0.2 EU/DL (ref 0.2–1)
WBC URNS QL MICRO: ABNORMAL /HPF (ref 0–4)

## 2017-11-15 PROCEDURE — 87077 CULTURE AEROBIC IDENTIFY: CPT | Performed by: EMERGENCY MEDICINE

## 2017-11-15 PROCEDURE — 81025 URINE PREGNANCY TEST: CPT

## 2017-11-15 PROCEDURE — 87491 CHLMYD TRACH DNA AMP PROBE: CPT | Performed by: PHYSICIAN ASSISTANT

## 2017-11-15 PROCEDURE — 87210 SMEAR WET MOUNT SALINE/INK: CPT | Performed by: PHYSICIAN ASSISTANT

## 2017-11-15 PROCEDURE — 99284 EMERGENCY DEPT VISIT MOD MDM: CPT

## 2017-11-15 PROCEDURE — 81001 URINALYSIS AUTO W/SCOPE: CPT | Performed by: EMERGENCY MEDICINE

## 2017-11-15 PROCEDURE — 87086 URINE CULTURE/COLONY COUNT: CPT | Performed by: EMERGENCY MEDICINE

## 2017-11-15 PROCEDURE — 87186 SC STD MICRODIL/AGAR DIL: CPT | Performed by: EMERGENCY MEDICINE

## 2017-11-15 RX ORDER — METRONIDAZOLE 500 MG/1
500 TABLET ORAL 2 TIMES DAILY
Qty: 14 TAB | Refills: 0 | Status: SHIPPED | OUTPATIENT
Start: 2017-11-15 | End: 2017-11-22

## 2017-11-15 RX ORDER — NITROFURANTOIN 25; 75 MG/1; MG/1
100 CAPSULE ORAL 2 TIMES DAILY
Qty: 14 CAP | Refills: 0 | Status: SHIPPED | OUTPATIENT
Start: 2017-11-15 | End: 2017-11-22

## 2017-11-16 NOTE — DISCHARGE INSTRUCTIONS
Bacterial Vaginosis: Care Instructions  Your Care Instructions    Bacterial vaginosis is a type of vaginal infection. It is caused by excess growth of certain bacteria that are normally found in the vagina. Symptoms can include itching, swelling, pain when you urinate or have sex, and a gray or yellow discharge with a \"fishy\" odor. It is not considered an infection that is spread through sexual contact. Although symptoms can be annoying and uncomfortable, bacterial vaginosis does not usually cause other health problems. However, if you have it while you are pregnant, it can cause complications. While the infection may go away on its own, most doctors use antibiotics to treat it. You may have been prescribed pills or vaginal cream. With treatment, bacterial vaginosis usually clears up in 5 to 7 days. Follow-up care is a key part of your treatment and safety. Be sure to make and go to all appointments, and call your doctor if you are having problems. It's also a good idea to know your test results and keep a list of the medicines you take. How can you care for yourself at home? · Take your antibiotics as directed. Do not stop taking them just because you feel better. You need to take the full course of antibiotics. · Do not eat or drink anything that contains alcohol if you are taking metronidazole (Flagyl). · Keep using your medicine if you start your period. Use pads instead of tampons while using a vaginal cream or suppository. Tampons can absorb the medicine. · Wear loose cotton clothing. Do not wear nylon and other materials that hold body heat and moisture close to the skin. · Do not scratch. Relieve itching with a cold pack or a cool bath. · Do not wash your vaginal area more than once a day. Use plain water or a mild, unscented soap. Do not douche. When should you call for help?   Watch closely for changes in your health, and be sure to contact your doctor if:  ? · You have unexpected vaginal bleeding. ? · You have a fever. ? · You have new or increased pain in your vagina or pelvis. ? · You are not getting better after 1 week. ? · Your symptoms return after you finish the course of your medicine. Where can you learn more? Go to http://dee-chuy.info/. Katrina Ham in the search box to learn more about \"Bacterial Vaginosis: Care Instructions. \"  Current as of: October 13, 2016  Content Version: 11.4  © 1424-1965 [employer]. Care instructions adapted under license by [employer] (which disclaims liability or warranty for this information). If you have questions about a medical condition or this instruction, always ask your healthcare professional. [place] [id number] any warranty or liability for your use of this information. Urinary Tract Infection in Women: Care Instructions  Your Care Instructions    A urinary tract infection, or UTI, is a general term for an infection anywhere between the kidneys and the urethra (where urine comes out). Most UTIs are bladder infections. They often cause pain or burning when you urinate. UTIs are caused by bacteria and can be cured with antibiotics. Be sure to complete your treatment so that the infection goes away. Follow-up care is a key part of your treatment and safety. Be sure to make and go to all appointments, and call your doctor if you are having problems. It's also a good idea to know your test results and keep a list of the medicines you take. How can you care for yourself at home? · Take your antibiotics as directed. Do not stop taking them just because you feel better. You need to take the full course of antibiotics. · Drink extra water and other fluids for the next day or two. This may help wash out the bacteria that are causing the infection.  (If you have kidney, heart, or liver disease and have to limit fluids, talk with your doctor before you increase your fluid intake.)  · Avoid drinks that are carbonated or have caffeine. They can irritate the bladder. · Urinate often. Try to empty your bladder each time. · To relieve pain, take a hot bath or lay a heating pad set on low over your lower belly or genital area. Never go to sleep with a heating pad in place. To prevent UTIs  · Drink plenty of water each day. This helps you urinate often, which clears bacteria from your system. (If you have kidney, heart, or liver disease and have to limit fluids, talk with your doctor before you increase your fluid intake.)  · Urinate when you need to. · Urinate right after you have sex. · Change sanitary pads often. · Avoid douches, bubble baths, feminine hygiene sprays, and other feminine hygiene products that have deodorants. · After going to the bathroom, wipe from front to back. When should you call for help? Call your doctor now or seek immediate medical care if:  ? · Symptoms such as fever, chills, nausea, or vomiting get worse or appear for the first time. ? · You have new pain in your back just below your rib cage. This is called flank pain. ? · There is new blood or pus in your urine. ? · You have any problems with your antibiotic medicine. ? Watch closely for changes in your health, and be sure to contact your doctor if:  ? · You are not getting better after taking an antibiotic for 2 days. ? · Your symptoms go away but then come back. Where can you learn more? Go to http://dee-chuy.info/. Enter T399 in the search box to learn more about \"Urinary Tract Infection in Women: Care Instructions. \"  Current as of: May 12, 2017  Content Version: 11.4  © 3691-2662 [employer]. Care instructions adapted under license by [employer] (which disclaims liability or warranty for this information).  If you have questions about a medical condition or this instruction, always ask your healthcare professional. [employer] disclaims any warranty or liability for your use of this information.

## 2017-11-16 NOTE — ED PROVIDER NOTES
Patient is a 23 y.o. female presenting with female genitourinary complaint. The history is provided by the patient. Vaginal Pain    This is a new problem. The current episode started yesterday (pt states she used a new soap but thinks it caused some \"irritation\" and now concerned for yeast infection). The problem occurs constantly. The problem has not changed since onset. The discharge occurs spontaneously. The discharge was white. She is not pregnant. She has not missed her period. Associated symptoms include dysuria and genital itching. Pertinent negatives include no fever, no abdominal pain, no nausea and no vomiting. Associated symptoms comments: Pain 8/10. She has tried nothing for the symptoms. Past Medical History:   Diagnosis Date    Asthma        Past Surgical History:   Procedure Laterality Date    HX ORTHOPAEDIC      ACL repaired         No family history on file. Social History     Social History    Marital status: SINGLE     Spouse name: N/A    Number of children: N/A    Years of education: N/A     Occupational History    Not on file. Social History Main Topics    Smoking status: Never Smoker    Smokeless tobacco: Never Used    Alcohol use No    Drug use: No    Sexual activity: Yes     Partners: Male     Birth control/ protection: Pill     Other Topics Concern    Not on file     Social History Narrative         ALLERGIES: Latex and Pcn [penicillins]    Review of Systems   Constitutional: Negative for fever. Gastrointestinal: Negative for abdominal pain, nausea and vomiting. Genitourinary: Positive for dysuria, vaginal discharge and vaginal pain. Musculoskeletal: Negative. Neurological: Negative for speech difficulty. All other systems reviewed and are negative.       Vitals:    11/15/17 1951   BP: 104/61   Pulse: 82   Resp: 12   Temp: 98.2 °F (36.8 °C)   SpO2: 100%   Weight: 54.4 kg (120 lb)   Height: 5' 8\" (1.727 m)            Physical Exam   Constitutional: She is oriented to person, place, and time. She appears well-developed and well-nourished. No distress. HENT:   Head: Normocephalic and atraumatic. Eyes: Conjunctivae are normal.   Cardiovascular: Normal rate, regular rhythm and normal heart sounds. Pulmonary/Chest: Effort normal and breath sounds normal. No respiratory distress. She has no wheezes. She has no rales. Abdominal: Soft. Bowel sounds are normal. She exhibits no distension. There is no tenderness. There is no rebound and no guarding. Genitourinary: Cervix exhibits discharge (scant amount of thin whitish discharge present). Cervix exhibits no motion tenderness. Right adnexum displays no tenderness. Left adnexum displays no tenderness. Vaginal discharge found. Neurological: She is alert and oriented to person, place, and time. Skin: Skin is warm and dry. Psychiatric: She has a normal mood and affect. Her behavior is normal. Judgment and thought content normal.   Nursing note and vitals reviewed.    at 8:50 PM      MDM  Number of Diagnoses or Management Options  Diagnosis management comments: DDX: yeast infection, UTI, pregnancy, STI-pt has no concerns       Amount and/or Complexity of Data Reviewed  Clinical lab tests: ordered and reviewed      ED Course       Procedures  MEDICATIONS GIVEN:  Medications - No data to display    LABS REVIEWED:  Recent Results (from the past 24 hour(s))   URINALYSIS W/ REFLEX CULTURE    Collection Time: 11/15/17  8:12 PM   Result Value Ref Range    Color YELLOW/STRAW      Appearance CLEAR CLEAR      Specific gravity 1.020 1.003 - 1.030      pH (UA) 6.0 5.0 - 8.0      Protein NEGATIVE  NEG mg/dL    Glucose NEGATIVE  NEG mg/dL    Ketone NEGATIVE  NEG mg/dL    Bilirubin NEGATIVE  NEG      Blood NEGATIVE  NEG      Urobilinogen 0.2 0.2 - 1.0 EU/dL    Nitrites NEGATIVE  NEG      Leukocyte Esterase SMALL (A) NEG      WBC 0-4 0 - 4 /hpf    RBC 0-5 0 - 5 /hpf    Epithelial cells FEW FEW /lpf    Bacteria 3+ (A) NEG /hpf UA: UC IF INDICATED URINE CULTURE ORDERED (A) CNI     ANNETTE, OTHER SOURCES    Collection Time: 11/15/17  8:12 PM   Result Value Ref Range    Special Requests: NO SPECIAL REQUESTS      KOH NO YEAST SEEN     WET PREP    Collection Time: 11/15/17  8:12 PM   Result Value Ref Range    Clue cells CLUE CELLS PRESENT      Wet prep NO TRICHOMONAS SEEN     HCG URINE, QL. - POC    Collection Time: 11/15/17  8:22 PM   Result Value Ref Range    Pregnancy test,urine (POC) NEGATIVE  NEG         PROGRESS NOTE:  A/P  8:49 PM  Reviewed lab results with pt. The patient's signs, symptoms, diagnosis, and discharge instruction have been discussed and the patient has conveyed their understanding. The patient is to follow up with PCP  or return to the ER should their symptoms worsen. Plan has been discussed and the patient is in agreement. Pt is ready for discharge      DIAGNOSIS:  1. BV (bacterial vaginosis)    2. Acute cystitis without hematuria        PLAN:  Follow-up Information     Follow up With Details Comments Contact Info    Rob Barrow MD Schedule an appointment as soon as possible for a visit in 2 days As needed [address] [facility]  [phone]          Current Discharge Medication List      START taking these medications    Details   metroNIDAZOLE (FLAGYL) 500 mg tablet Take 1 Tab by mouth two (2) times a day for 7 days. Qty: 14 Tab, Refills: 0      nitrofurantoin, macrocrystal-monohydrate, (MACROBID) 100 mg capsule Take 1 Cap by mouth two (2) times a day for 7 days.   Qty: 14 Cap, Refills: 0

## 2017-11-16 NOTE — ED NOTES
Pt presents ambulatory to ED complaining of thick, white vaginal discharge X1 day. Pt reports she used soap on her vaginal area and it \"got in there so its irritated\". Pt is alert and oriented x 4, RR even and unlabored, skin is warm and dry. Assesment completed and pt updated on plan of care. Emergency Department Nursing Plan of Care       The Nursing Plan of Care is developed from the Nursing assessment and Emergency Department Attending provider initial evaluation. The plan of care may be reviewed in the ED Provider note.     The Plan of Care was developed with the following considerations:   Patient / Family readiness to learn indicated by:verbalized understanding  Persons(s) to be included in education: patient  Barriers to Learning/Limitations:No    Signed     Chito Bear RN    11/15/2017   8:19 PM

## 2017-11-16 NOTE — ED NOTES
Discharge instructions were given to the patient by KAREN Hein. The patient left the Emergency Department ambulatory, alert and oriented and in no acute distress with 2 prescriptions. The patient was encouraged to call or return to the ED for worsening issues or problems and was encouraged to schedule a follow up appointment for continuing care. The patient verbalized understanding of discharge instructions and prescriptions, all questions were answered. The patient has no further concerns at this time.

## 2017-11-18 LAB
BACTERIA SPEC CULT: ABNORMAL
CC UR VC: ABNORMAL
SERVICE CMNT-IMP: ABNORMAL

## 2018-04-15 ENCOUNTER — HOSPITAL ENCOUNTER (EMERGENCY)
Age: 23
Discharge: HOME | End: 2018-04-15
Attending: EMERGENCY MEDICINE | Admitting: EMERGENCY MEDICINE
Payer: MEDICAID

## 2018-04-15 VITALS
SYSTOLIC BLOOD PRESSURE: 119 MMHG | HEIGHT: 68 IN | DIASTOLIC BLOOD PRESSURE: 72 MMHG | RESPIRATION RATE: 16 BRPM | BODY MASS INDEX: 16.97 KG/M2 | TEMPERATURE: 98.5 F | HEART RATE: 67 BPM | WEIGHT: 112 LBS

## 2018-04-15 DIAGNOSIS — N89.8 VAGINAL IRRITATION: Primary | ICD-10-CM

## 2018-04-15 LAB
APPEARANCE UR: CLEAR
BACTERIA URNS QL MICRO: NEGATIVE /HPF
BILIRUB UR QL: NEGATIVE
COLOR UR: NORMAL
EPITH CASTS URNS QL MICRO: NORMAL /LPF
GLUCOSE UR STRIP.AUTO-MCNC: NEGATIVE MG/DL
HCG UR QL: NEGATIVE
HGB UR QL STRIP: NEGATIVE
KETONES UR QL STRIP.AUTO: NEGATIVE MG/DL
LEUKOCYTE ESTERASE UR QL STRIP.AUTO: NEGATIVE
NITRITE UR QL STRIP.AUTO: NEGATIVE
PH UR STRIP: 5.5 [PH] (ref 5–8)
PROT UR STRIP-MCNC: NEGATIVE MG/DL
RBC #/AREA URNS HPF: NORMAL /HPF (ref 0–5)
SP GR UR REFRACTOMETRY: 1.02 (ref 1–1.03)
UA: UC IF INDICATED,UAUC: NORMAL
UROBILINOGEN UR QL STRIP.AUTO: 0.2 EU/DL (ref 0.2–1)
WBC URNS QL MICRO: NORMAL /HPF (ref 0–4)

## 2018-04-15 PROCEDURE — 81001 URINALYSIS AUTO W/SCOPE: CPT | Performed by: EMERGENCY MEDICINE

## 2018-04-15 PROCEDURE — 99283 EMERGENCY DEPT VISIT LOW MDM: CPT

## 2018-04-15 PROCEDURE — 81025 URINE PREGNANCY TEST: CPT

## 2018-04-15 RX ORDER — DIPHENHYDRAMINE HCL 25 MG
25 CAPSULE ORAL
Qty: 20 CAP | Refills: 0 | Status: SHIPPED | OUTPATIENT
Start: 2018-04-15 | End: 2018-05-12

## 2018-04-15 NOTE — ED NOTES
Emergency Department Nursing Plan of Care       The Nursing Plan of Care is developed from the Nursing assessment and Emergency Department Attending provider initial evaluation. The plan of care may be reviewed in the ED Provider note. The Plan of Care was developed with the following considerations:   Patient / Family readiness to learn indicated by:verbalized understanding  Persons(s) to be included in education: patient  Barriers to Learning/Limitations:No    Signed     [place] Blayne    4/15/2018   8:44 AM    See nursing assessment    Patient is alert and oriented x 4 and in no acute distress at this time. Respirations are at a regular rate, depth, and pattern. Patient updated on plan of care and has no questions or concerns at this time.

## 2018-04-15 NOTE — ED TRIAGE NOTES
Patient states she is here today vaginal pain and irritation. She states she is also having mild abdominal pain too but her vaginia is worse. She states it started yesterday after having sex because the condom they used and some type of \"stuff\" on it.

## 2018-04-15 NOTE — ED PROVIDER NOTES
EMERGENCY DEPARTMENT HISTORY AND PHYSICAL EXAM      Date: 4/15/2018  Patient Name: Mindi Hernandez    History of Presenting Illness     Chief Complaint   Patient presents with    Vaginal Pain     vaginal pain and irritation. She states she is also having mild abdominal pain too but her vaginia is worse. She states it started yesterday after having sex becuase the condom they used and some type of \"stuff\" on it. History Provided By: Patient    HPI: Mindi Hernandez, 20 y.o. female with PMHx significant for asthma, presents amabulatory to the ED with cc of vaginal pain x 2 days. Pt states she used condoms two days ago that had \"something on them\" that caused irritation and itching. Denies change in discharge, lesions, dysuria, hematuria, fever/chills. Pt state she took a pill that treats yeast infections, but it did not help. Pt rates current discomfort as 8/10. PCP: Marquise Goff MD    There are no other complaints, changes, or physical findings at this time. Current Outpatient Prescriptions   Medication Sig Dispense Refill    diphenhydrAMINE (BENADRYL) 25 mg capsule Take 1 Cap by mouth every six (6) hours as needed. 20 Cap 0    ALBUTEROL IN Take  by inhalation.  fluticasone (FLONASE) 50 mcg/actuation nasal spray 2 Sprays by Both Nostrils route daily. 1 Bottle 0    loratadine-pseudoephedrine (CLARITIN-D 12 HOUR) 5-120 mg per tablet Take 1 Tab by mouth two (2) times a day. 20 Tab 0    ibuprofen (MOTRIN) 600 mg tablet Take 1 Tab by mouth every six (6) hours as needed for Pain. 20 Tab 0    famotidine (PEPCID) 20 mg tablet Take 0.5 Tabs by mouth daily. 3 Tab 0       Past History     Past Medical History:  Past Medical History:   Diagnosis Date    Asthma        Past Surgical History:  Past Surgical History:   Procedure Laterality Date    HX ORTHOPAEDIC      ACL repaired       Family History:  History reviewed. No pertinent family history.     Social History:  Social History Substance Use Topics    Smoking status: Never Smoker    Smokeless tobacco: Never Used    Alcohol use No       Allergies: Allergies   Allergen Reactions    Latex Other (comments)     Skin irritation    Pcn [Penicillins] Hives         Review of Systems   Review of Systems   Constitutional: Negative for chills and fever. Respiratory: Negative for shortness of breath. Cardiovascular: Negative for chest pain. Gastrointestinal: Negative for abdominal pain, nausea and vomiting. Genitourinary: Positive for vaginal pain. Negative for dysuria, flank pain, frequency, genital sores, pelvic pain, vaginal bleeding and vaginal discharge. Musculoskeletal: Negative for back pain and myalgias. Skin: Negative for color change, pallor, rash and wound. Neurological: Negative for dizziness, weakness and light-headedness. All other systems reviewed and are negative. Physical Exam   Physical Exam   Constitutional: She is oriented to person, place, and time. She appears well-developed and well-nourished. No distress. HENT:   Head: Normocephalic and atraumatic. Eyes: Conjunctivae are normal.   Cardiovascular: Normal rate, regular rhythm and normal heart sounds. Pulmonary/Chest: Effort normal and breath sounds normal. No respiratory distress. Abdominal: Soft. Bowel sounds are normal. She exhibits no distension. Genitourinary: There is no rash or lesion on the right labia. There is no rash or lesion on the left labia. Genitourinary Comments: Mild swelling appreciated to labial area. No rash or lesions. No pelvic exam performed. Musculoskeletal: Normal range of motion. Neurological: She is alert and oriented to person, place, and time. Skin: Skin is warm. No rash noted. Psychiatric: She has a normal mood and affect. Her behavior is normal.   Nursing note and vitals reviewed.         Diagnostic Study Results     Labs -     Recent Results (from the past 12 hour(s))   URINALYSIS W/ REFLEX CULTURE Collection Time: 04/15/18  8:45 AM   Result Value Ref Range    Color YELLOW/STRAW      Appearance CLEAR CLEAR      Specific gravity 1.025 1.003 - 1.030      pH (UA) 5.5 5.0 - 8.0      Protein NEGATIVE  NEG mg/dL    Glucose NEGATIVE  NEG mg/dL    Ketone NEGATIVE  NEG mg/dL    Bilirubin NEGATIVE  NEG      Blood NEGATIVE  NEG      Urobilinogen 0.2 0.2 - 1.0 EU/dL    Nitrites NEGATIVE  NEG      Leukocyte Esterase NEGATIVE  NEG      WBC 0-4 0 - 4 /hpf    RBC 0-5 0 - 5 /hpf    Epithelial cells FEW FEW /lpf    Bacteria NEGATIVE  NEG /hpf    UA:UC IF INDICATED CULTURE NOT INDICATED BY UA RESULT CNI     HCG URINE, QL. - POC    Collection Time: 04/15/18  9:01 AM   Result Value Ref Range    Pregnancy test,urine (POC) NEGATIVE  NEG         Radiologic Studies -   No orders to display     CT Results  (Last 48 hours)    None        CXR Results  (Last 48 hours)    None            Medical Decision Making   I am the first provider for this patient. I reviewed the vital signs, available nursing notes, past medical history, past surgical history, family history and social history. Vital Signs-Reviewed the patient's vital signs. Patient Vitals for the past 12 hrs:   Temp Pulse Resp BP   04/15/18 0842 98.5 °F (36.9 °C) 67 16 119/72       Records Reviewed: Nursing Notes and Old Medical Records    Provider Notes (Medical Decision Making):   DDx: Vaginal irritation, allergic reaction, yeast infection    No pelvic exam performed due to lack of change in vaginal discharge. ED Course:   Initial assessment performed. The patients presenting problems have been discussed, and they are in agreement with the care plan formulated and outlined with them. I have encouraged them to ask questions as they arise throughout their visit. Disposition:  9:46 AM  Discussed dx and treatment plan. Discussed importance of PCP follow up. All questions answered. Pt voiced they understood. Return if sx worsen. PLAN:  1.    Current Discharge Medication List      START taking these medications    Details   diphenhydrAMINE (BENADRYL) 25 mg capsule Take 1 Cap by mouth every six (6) hours as needed. Qty: 20 Cap, Refills: 0           2. Follow-up Information     Follow up With Details Comments Contact Marcus Peters MD Schedule an appointment as soon as possible for a visit As needed [address] [phone]          Return to ED if worse     Diagnosis     Clinical Impression:   1.  Vaginal irritation

## 2018-04-15 NOTE — DISCHARGE INSTRUCTIONS

## 2018-05-12 ENCOUNTER — HOSPITAL ENCOUNTER (EMERGENCY)
Age: 23
Discharge: HOME | End: 2018-05-12
Attending: EMERGENCY MEDICINE
Payer: MEDICAID

## 2018-05-12 VITALS
SYSTOLIC BLOOD PRESSURE: 116 MMHG | HEART RATE: 79 BPM | BODY MASS INDEX: 18.79 KG/M2 | WEIGHT: 124 LBS | TEMPERATURE: 98.3 F | RESPIRATION RATE: 20 BRPM | DIASTOLIC BLOOD PRESSURE: 61 MMHG | HEIGHT: 68 IN | OXYGEN SATURATION: 100 %

## 2018-05-12 DIAGNOSIS — T78.40XA ALLERGIC REACTION, INITIAL ENCOUNTER: Primary | ICD-10-CM

## 2018-05-12 PROCEDURE — 74011250637 HC RX REV CODE- 250/637: Performed by: EMERGENCY MEDICINE

## 2018-05-12 PROCEDURE — 99283 EMERGENCY DEPT VISIT LOW MDM: CPT

## 2018-05-12 RX ORDER — DIPHENHYDRAMINE HCL 25 MG
25 CAPSULE ORAL
Qty: 20 CAP | Refills: 0 | Status: SHIPPED | OUTPATIENT
Start: 2018-05-12 | End: 2019-05-05

## 2018-05-12 RX ORDER — CLINDAMYCIN HYDROCHLORIDE 300 MG/1
300 CAPSULE ORAL 4 TIMES DAILY
Qty: 20 CAP | Refills: 0 | Status: SHIPPED | OUTPATIENT
Start: 2018-05-12 | End: 2018-05-12

## 2018-05-12 RX ORDER — CLINDAMYCIN HYDROCHLORIDE 300 MG/1
300 CAPSULE ORAL 4 TIMES DAILY
Qty: 20 CAP | Refills: 0 | Status: SHIPPED | OUTPATIENT
Start: 2018-05-12 | End: 2018-05-17

## 2018-05-12 RX ORDER — DIPHENHYDRAMINE HCL 25 MG
50 CAPSULE ORAL
Status: COMPLETED | OUTPATIENT
Start: 2018-05-12 | End: 2018-05-12

## 2018-05-12 RX ADMIN — DIPHENHYDRAMINE HYDROCHLORIDE 50 MG: 25 CAPSULE ORAL at 02:53

## 2018-05-12 NOTE — ED NOTES
Patient has been instructed that they have been given benadryl which contains opioids, benzodiazepines, or other sedating drugs. Patient is aware that they  will need to refrain from driving or operating heavy machinery after taking this medication. Patient also instructed that they need to avoid drinking alcohol and using other products containing opioids, benzodiazepines, or other sedating drugs. Patient verbalized understanding.  at bedside.

## 2018-05-12 NOTE — DISCHARGE INSTRUCTIONS

## 2018-05-12 NOTE — ED NOTES
Pt arrived to ED via ambulatory with c/o reaction to amoxicillin stating her skin is itchy all over since Thursday. Pt. States she was administered amoxicillin when having her wisdom teeth pulled on Thursday. No visible rash observed. Pt is alert and orientated X 4; skin is intact; lungs are clear; pt breathes well on room air; Pt is in no acute distress. Will continue to monitor. See nursing assessment. Safety precautions in place; call light within reach. Emergency Department Nursing Plan of Care       The Nursing Plan of Care is developed from the Nursing assessment and Emergency Department Attending provider initial evaluation. The plan of care may be reviewed in the ED Provider note.     The Plan of Care was developed with the following considerations:   Patient / Family readiness to learn indicated by:verbalized understanding  Persons(s) to be included in education: patient  Barriers to Learning/Limitations:No    Signed     Saleem Briggs RN    5/12/2018   2:44 AM

## 2018-05-12 NOTE — ED PROVIDER NOTES
EMERGENCY DEPARTMENT HISTORY AND PHYSICAL EXAM      Date: 5/12/2018  Patient Name: Chinmay Montero    History of Presenting Illness     Chief Complaint   Patient presents with    Allergic Reaction     X 1 day, pt states itching after taking Amoxicillin for wisdom teeth extraction on Thursday       History Provided By: Patient    HPI: Chinmay Montero, 20 y.o. female with PMHx significant for asthma, presents ambulatory to the ED for evaluation of worsening, mild to moderate generalized itching x 1 day. She denies any associated symptoms. Pt reports onset after taking Amoxicillin s/p wisdom teeth extraction on 5/10. She states she took amoxicillin for 2 days. She reports known allergy to penicillin. She notes she did not take Benadryl tonight. Pt specifically denies any swelling, rash, NV or difficulty swallowing. There are no other complaints, changes, or physical findings at this time. PCP: Abiola Potter MD    Current Facility-Administered Medications   Medication Dose Route Frequency Provider Last Rate Last Dose    diphenhydrAMINE (BENADRYL) capsule 50 mg  50 mg Oral NOW Dora Gamion MD         Current Outpatient Prescriptions   Medication Sig Dispense Refill    diphenhydrAMINE (BENADRYL) 25 mg capsule Take 1 Cap by mouth every six (6) hours as needed. 20 Cap 0    clindamycin (CLEOCIN) 300 mg capsule Take 1 Cap by mouth four (4) times daily for 5 days. 20 Cap 0    ALBUTEROL IN Take  by inhalation.  fluticasone (FLONASE) 50 mcg/actuation nasal spray 2 Sprays by Both Nostrils route daily. 1 Bottle 0    loratadine-pseudoephedrine (CLARITIN-D 12 HOUR) 5-120 mg per tablet Take 1 Tab by mouth two (2) times a day. 20 Tab 0    ibuprofen (MOTRIN) 600 mg tablet Take 1 Tab by mouth every six (6) hours as needed for Pain. 20 Tab 0    famotidine (PEPCID) 20 mg tablet Take 0.5 Tabs by mouth daily.  3 Tab 0       Past History     Past Medical History:  Past Medical History:   Diagnosis Date  Asthma        Past Surgical History:  Past Surgical History:   Procedure Laterality Date    HX ORTHOPAEDIC      ACL repaired       Family History:  History reviewed. No pertinent family history. Social History:  Social History   Substance Use Topics    Smoking status: Never Smoker    Smokeless tobacco: Never Used    Alcohol use No       Allergies: Allergies   Allergen Reactions    Latex Other (comments)     Skin irritation    Amoxicillin Itching    Pcn [Penicillins] Hives    Shellfish Derived Itching         Review of Systems   Review of Systems   Constitutional: Negative. Negative for chills, diaphoresis and fever. HENT: Negative. Negative for congestion, sore throat and trouble swallowing. Eyes: Negative. Negative for photophobia, pain and redness. Respiratory: Negative. Negative for cough, chest tightness, shortness of breath and wheezing. Cardiovascular: Negative. Negative for chest pain and palpitations. Gastrointestinal: Negative. Negative for abdominal pain, blood in stool, diarrhea, nausea and vomiting. Genitourinary: Negative for difficulty urinating, dysuria and frequency. Musculoskeletal: Negative. Negative for arthralgias, myalgias, neck pain and neck stiffness. Skin: Negative. Negative for rash. (+) generalized itching   Neurological: Negative. Negative for dizziness, tremors, seizures, syncope, speech difficulty, light-headedness and headaches. Psychiatric/Behavioral: Negative. Negative for confusion. The patient is not nervous/anxious. All other systems reviewed and are negative. Physical Exam   Physical Exam   Constitutional: She is oriented to person, place, and time. She appears well-developed and well-nourished. HENT:   Head: Normocephalic and atraumatic. Mouth/Throat: Oropharynx is clear and moist. No posterior oropharyngeal edema. No lip swelling. No stridor. Eyes: Conjunctivae and EOM are normal.   Neck: Normal range of motion. Neck supple. Cardiovascular: Normal rate and regular rhythm. Pulmonary/Chest: Effort normal and breath sounds normal. No respiratory distress. Abdominal: Soft. She exhibits no distension. There is no tenderness. Musculoskeletal: Normal range of motion. Neurological: She is alert and oriented to person, place, and time. Skin: Skin is warm and dry. No rash noted. No hives. Psychiatric: She has a normal mood and affect. Nursing note and vitals reviewed. Medical Decision Making   I am the first provider for this patient. I reviewed the vital signs, available nursing notes, past medical history, past surgical history, family history and social history. Vital Signs-Reviewed the patient's vital signs. Patient Vitals for the past 12 hrs:   Temp Pulse Resp BP SpO2   05/12/18 0246 - - - - 100 %   05/12/18 0234 98.3 °F (36.8 °C) 79 20 116/61 100 %       Records Reviewed: Nursing Notes and Old Medical Records    Provider Notes (Medical Decision Making):   Patient presents with generalized itching after taking Amoxicillin. DDx: allergic reaction, contact dermatitis, viral exanthem, staph infection. Will treat with benadryl. No indication for EpiPen at this time. Will switch to clindamycin. Discussed the nature of allergic reaction to the patient. Hives are generally related to foods, sun, heat, cold or viruses. Plan is to use over the counter benadryl 50mg every 6 hours until the itching resolves    ED Course:   Initial assessment performed. The patients presenting problems have been discussed, and they are in agreement with the care plan formulated and outlined with them. I have encouraged them to ask questions as they arise throughout their visit. Critical Care Time:   0    Disposition:  DISCHARGE NOTE  2:49 AM  The patient has been re-evaluated and is ready for discharge. Reviewed available results with patient. Counseled pt on diagnosis and care plan.  Pt has expressed understanding, and all questions have been answered. Pt agrees with plan and agrees to follow up as recommended, or return to the ED if their symptoms worsen. Discharge instructions have been provided and explained to the pt, along with reasons to return to the ED. PLAN:  1. Current Discharge Medication List      START taking these medications    Details   clindamycin (CLEOCIN) 300 mg capsule Take 1 Cap by mouth four (4) times daily for 5 days. Qty: 20 Cap, Refills: 0         CONTINUE these medications which have CHANGED    Details   diphenhydrAMINE (BENADRYL) 25 mg capsule Take 1 Cap by mouth every six (6) hours as needed. Qty: 20 Cap, Refills: 0           2. Follow-up Information     Follow up With Details Comments Contact Slava Castro MD  As needed [phone]          Return to ED if worse     Diagnosis     Clinical Impression:   1. Allergic reaction, initial encounter        Attestations: This note is prepared by Jung Gomez, acting as Scribe for Alexandra Fontaine M.D. Alexandra Fontaine M.D: The scribe's documentation has been prepared under my direction and personally reviewed by me in its entirety. I confirm that the note above accurately reflects all work, treatment, procedures, and medical decision making performed by me.

## 2018-07-03 ENCOUNTER — HOSPITAL ENCOUNTER (EMERGENCY)
Age: 23
Discharge: HOME | End: 2018-07-03
Attending: EMERGENCY MEDICINE | Admitting: EMERGENCY MEDICINE
Payer: COMMERCIAL

## 2018-07-03 VITALS
TEMPERATURE: 98.4 F | DIASTOLIC BLOOD PRESSURE: 50 MMHG | OXYGEN SATURATION: 100 % | BODY MASS INDEX: 16.59 KG/M2 | WEIGHT: 112 LBS | HEIGHT: 69 IN | SYSTOLIC BLOOD PRESSURE: 99 MMHG | HEART RATE: 77 BPM | RESPIRATION RATE: 16 BRPM

## 2018-07-03 DIAGNOSIS — N76.0 BV (BACTERIAL VAGINOSIS): Primary | ICD-10-CM

## 2018-07-03 DIAGNOSIS — B96.89 BV (BACTERIAL VAGINOSIS): Primary | ICD-10-CM

## 2018-07-03 LAB
CLUE CELLS VAG QL WET PREP: NORMAL
HCG UR QL: NEGATIVE
KOH PREP SPEC: NORMAL
SERVICE CMNT-IMP: NORMAL
T VAGINALIS VAG QL WET PREP: NORMAL

## 2018-07-03 PROCEDURE — 87210 SMEAR WET MOUNT SALINE/INK: CPT | Performed by: PHYSICIAN ASSISTANT

## 2018-07-03 PROCEDURE — 87491 CHLMYD TRACH DNA AMP PROBE: CPT | Performed by: PHYSICIAN ASSISTANT

## 2018-07-03 PROCEDURE — 99284 EMERGENCY DEPT VISIT MOD MDM: CPT

## 2018-07-03 PROCEDURE — 81025 URINE PREGNANCY TEST: CPT

## 2018-07-03 RX ORDER — METRONIDAZOLE 500 MG/1
500 TABLET ORAL 2 TIMES DAILY
Qty: 14 TAB | Refills: 0 | Status: SHIPPED | OUTPATIENT
Start: 2018-07-03 | End: 2018-07-10

## 2018-07-04 NOTE — DISCHARGE INSTRUCTIONS
Bacterial Vaginosis: Care Instructions  Your Care Instructions    Bacterial vaginosis is a type of vaginal infection. It is caused by excess growth of certain bacteria that are normally found in the vagina. Symptoms can include itching, swelling, pain when you urinate or have sex, and a gray or yellow discharge with a \"fishy\" odor. It is not considered an infection that is spread through sexual contact. Although symptoms can be annoying and uncomfortable, bacterial vaginosis does not usually cause other health problems. However, if you have it while you are pregnant, it can cause complications. While the infection may go away on its own, most doctors use antibiotics to treat it. You may have been prescribed pills or vaginal cream. With treatment, bacterial vaginosis usually clears up in 5 to 7 days. Follow-up care is a key part of your treatment and safety. Be sure to make and go to all appointments, and call your doctor if you are having problems. It's also a good idea to know your test results and keep a list of the medicines you take. How can you care for yourself at home? · Take your antibiotics as directed. Do not stop taking them just because you feel better. You need to take the full course of antibiotics. · Do not eat or drink anything that contains alcohol if you are taking metronidazole (Flagyl). · Keep using your medicine if you start your period. Use pads instead of tampons while using a vaginal cream or suppository. Tampons can absorb the medicine. · Wear loose cotton clothing. Do not wear nylon and other materials that hold body heat and moisture close to the skin. · Do not scratch. Relieve itching with a cold pack or a cool bath. · Do not wash your vaginal area more than once a day. Use plain water or a mild, unscented soap. Do not douche. When should you call for help?   Watch closely for changes in your health, and be sure to contact your doctor if:  ? · You have unexpected vaginal bleeding. ? · You have a fever. ? · You have new or increased pain in your vagina or pelvis. ? · You are not getting better after 1 week. ? · Your symptoms return after you finish the course of your medicine. Where can you learn more? Go to http://dee-chuy.info/. Deborah Ovalles in the search box to learn more about \"Bacterial Vaginosis: Care Instructions. \"  Current as of: October 13, 2016  Content Version: 11.4  © 0295-0942 [employer]. Care instructions adapted under license by [employer] (which disclaims liability or warranty for this information). If you have questions about a medical condition or this instruction, always ask your healthcare professional. [address] any warranty or liability for your use of this information.

## 2018-07-04 NOTE — ED PROVIDER NOTES
EMERGENCY DEPARTMENT HISTORY AND PHYSICAL EXAM    Date: 7/3/2018  Patient Name: Bk Delarosa    History of Presenting Illness     Chief Complaint   Patient presents with    Vaginal Itching     to skin around perineum after getting a \"wax,\" reports hair is growing back and now she is \"irritated down there. \"         History Provided By: Patient    HPI: Bk Delarosa is a 20 y.o. female with a PMH of asthma who presents with vaginal itching, irritation and discharge since yesterday. Pt states she got waxed 2wks ago and unsure if that is cause of symptoms. Pt denies any dysuria, abd pain, N/V, or urinary frequency. LMP 6/14. Pt rates irritation 6/10. Pt has not tried anything for symptoms. PCP: Thong Fragoso MD    Current Outpatient Prescriptions   Medication Sig Dispense Refill    metroNIDAZOLE (FLAGYL) 500 mg tablet Take 1 Tab by mouth two (2) times a day for 7 days. 14 Tab 0    diphenhydrAMINE (BENADRYL) 25 mg capsule Take 1 Cap by mouth every six (6) hours as needed. 20 Cap 0    ALBUTEROL IN Take  by inhalation.  fluticasone (FLONASE) 50 mcg/actuation nasal spray 2 Sprays by Both Nostrils route daily. 1 Bottle 0    loratadine-pseudoephedrine (CLARITIN-D 12 HOUR) 5-120 mg per tablet Take 1 Tab by mouth two (2) times a day. 20 Tab 0    ibuprofen (MOTRIN) 600 mg tablet Take 1 Tab by mouth every six (6) hours as needed for Pain. 20 Tab 0    famotidine (PEPCID) 20 mg tablet Take 0.5 Tabs by mouth daily. 3 Tab 0       Past History     Past Medical History:  Past Medical History:   Diagnosis Date    Asthma        Past Surgical History:  Past Surgical History:   Procedure Laterality Date    HX ORTHOPAEDIC      ACL repaired       Family History:  History reviewed. No pertinent family history. Social History:  Social History   Substance Use Topics    Smoking status: Never Smoker    Smokeless tobacco: Never Used    Alcohol use No       Allergies:   Allergies   Allergen Reactions    Latex Other (comments)     Skin irritation    Amoxicillin Itching    Pcn [Penicillins] Hives    Shellfish Derived Itching         Review of Systems   Review of Systems   Constitutional: Negative for fever. Genitourinary: Positive for vaginal discharge and vaginal pain. Negative for difficulty urinating, dysuria and frequency. Neurological: Negative for speech difficulty. All other systems reviewed and are negative. Physical Exam     Vitals:    07/03/18 2003 07/03/18 2028   BP: 99/50    Pulse: 77    Resp: 16    Temp: 98.4 °F (36.9 °C)    SpO2: 100% 100%   Weight: 50.8 kg (112 lb)    Height: 5' 9\" (1.753 m)      Physical Exam   Constitutional: She is oriented to person, place, and time. She appears well-developed and well-nourished. No distress. HENT:   Head: Normocephalic and atraumatic. Eyes: Conjunctivae are normal.   Cardiovascular: Normal rate, regular rhythm and normal heart sounds. Pulmonary/Chest: Effort normal and breath sounds normal. No respiratory distress. She has no wheezes. She has no rales. Abdominal: Soft. Bowel sounds are normal.   Genitourinary: Pelvic exam was performed with patient supine. Cervix exhibits discharge (moderate amount of thick whitish discharge present). Cervix exhibits no motion tenderness. Vaginal discharge found. Neurological: She is alert and oriented to person, place, and time. Skin: Skin is warm and dry. Psychiatric: She has a normal mood and affect. Her behavior is normal. Judgment and thought content normal.   Nursing note and vitals reviewed.   at 8:57 PM    Diagnostic Study Results     Labs -     Recent Results (from the past 12 hour(s))   ANNETTE, OTHER SOURCES    Collection Time: 07/03/18  8:19 PM   Result Value Ref Range    Special Requests: NO SPECIAL REQUESTS      KOH NO YEAST SEEN     WET PREP    Collection Time: 07/03/18  8:19 PM   Result Value Ref Range    Clue cells CLUE CELLS PRESENT      Wet prep NO TRICHOMONAS SEEN     HCG URINE, QL. - POC    Collection Time: 07/03/18  8:25 PM   Result Value Ref Range    Pregnancy test,urine (POC) NEGATIVE  NEG         Radiologic Studies -   No orders to display     CT Results  (Last 48 hours)    None        CXR Results  (Last 48 hours)    None            Medical Decision Making   I am the first provider for this patient. I reviewed the vital signs, available nursing notes, past medical history, past surgical history, family history and social history. Vital Signs-Reviewed the patient's vital signs. Disposition:  Discharged    DISCHARGE NOTE:   8:56 PM      Care plan outlined and precautions discussed. Patient has no new complaints, changes, or physical findings. Results of swabs were reviewed with the patient. All medications were reviewed with the patient; will d/c home with abx. All of pt's questions and concerns were addressed. Patient was instructed and agrees to follow up with PCP, as well as to return to the ED upon further deterioration. Patient is ready to go home. Follow-up Information     Follow up With Details Comments Contact Rachele Davis MD Schedule an appointment as soon as possible for a visit in 1 week As needed [phone]  [phone]            Current Discharge Medication List      START taking these medications    Details   metroNIDAZOLE (FLAGYL) 500 mg tablet Take 1 Tab by mouth two (2) times a day for 7 days. Qty: 14 Tab, Refills: 0         CONTINUE these medications which have NOT CHANGED    Details   diphenhydrAMINE (BENADRYL) 25 mg capsule Take 1 Cap by mouth every six (6) hours as needed. Qty: 20 Cap, Refills: 0      ALBUTEROL IN Take  by inhalation. fluticasone (FLONASE) 50 mcg/actuation nasal spray 2 Sprays by Both Nostrils route daily. Qty: 1 Bottle, Refills: 0      loratadine-pseudoephedrine (CLARITIN-D 12 HOUR) 5-120 mg per tablet Take 1 Tab by mouth two (2) times a day.   Qty: 20 Tab, Refills: 0      ibuprofen (MOTRIN) 600 mg tablet Take 1 Tab by mouth every six (6) hours as needed for Pain. Qty: 20 Tab, Refills: 0      famotidine (PEPCID) 20 mg tablet Take 0.5 Tabs by mouth daily. Qty: 3 Tab, Refills: 0             Provider Notes (Medical Decision Making):   DDX: yeast, BV, vaginitis, allergic reaction, STI, pregnancy    Procedures        Diagnosis     Clinical Impression:   1.  BV (bacterial vaginosis)

## 2018-07-04 NOTE — ED NOTES
KAREN Lopez at bedside reviewing patient's discharge instructions and reviewing medications. Patient ambulatory home with self. Patient in no apparent distress.

## 2018-07-04 NOTE — ED NOTES
Pt arrived to ED via ambulatory with c/o vaginal itching w/vaginal discharge white, thick in appearance since last night. Pt is alert and orientated X 4; skin is intact; lungs are clear; pt breathes well on room air; Pt is in no acute distress. Will continue to monitor. See nursing assessment. Safety precautions in place; call light within reach. Emergency Department Nursing Plan of Care       The Nursing Plan of Care is developed from the Nursing assessment and Emergency Department Attending provider initial evaluation. The plan of care may be reviewed in the ED Provider note.     The Plan of Care was developed with the following considerations:   Patient / Family readiness to learn indicated by:verbalized understanding  Persons(s) to be included in education: patient  Barriers to Learning/Limitations:No    Signed     Yara Chase RN    7/3/2018   8:08 PM

## 2018-07-27 ENCOUNTER — HOSPITAL ENCOUNTER (EMERGENCY)
Age: 23
Discharge: HOME | End: 2018-07-27
Attending: EMERGENCY MEDICINE
Payer: MEDICAID

## 2018-07-27 VITALS
HEIGHT: 68 IN | BODY MASS INDEX: 16.97 KG/M2 | RESPIRATION RATE: 16 BRPM | OXYGEN SATURATION: 99 % | SYSTOLIC BLOOD PRESSURE: 107 MMHG | DIASTOLIC BLOOD PRESSURE: 60 MMHG | TEMPERATURE: 97.9 F | WEIGHT: 112 LBS | HEART RATE: 79 BPM

## 2018-07-27 DIAGNOSIS — N89.8 VAGINAL IRRITATION: Primary | ICD-10-CM

## 2018-07-27 DIAGNOSIS — T78.40XA ALLERGIC REACTION, INITIAL ENCOUNTER: ICD-10-CM

## 2018-07-27 PROCEDURE — 99282 EMERGENCY DEPT VISIT SF MDM: CPT

## 2018-07-27 NOTE — DISCHARGE INSTRUCTIONS
Allergic Reaction: Care Instructions  Your Care Instructions    An allergic reaction is an excessive response from your immune system to a medicine, chemical, food, insect bite, or other substance. A reaction can range from mild to life-threatening. Some people have a mild rash, hives, and itching or stomach cramps. In severe reactions, swelling of your tongue and throat can close up your airway so that you cannot breathe. Follow-up care is a key part of your treatment and safety. Be sure to make and go to all appointments, and call your doctor if you are having problems. It's also a good idea to know your test results and keep a list of the medicines you take. How can you care for yourself at home? · If you know what caused your allergic reaction, be sure to avoid it. Your allergy may become more severe each time you have a reaction. · Take an over-the-counter antihistamine, such as cetirizine (Zyrtec) or loratadine (Claritin), to treat mild symptoms. Read and follow directions on the label. Some antihistamines can make you feel sleepy. Do not give antihistamines to a child unless you have checked with your doctor first. Mild symptoms include sneezing or an itchy or runny nose; an itchy mouth; a few hives or mild itching; and mild nausea or stomach discomfort. · Do not scratch hives or a rash. Put a cold, moist towel on them or take cool baths to relieve itching. Put ice packs on hives, swelling, or insect stings for 10 to 15 minutes at a time. Put a thin cloth between the ice pack and your skin. Do not take hot baths or showers. They will make the itching worse. · Your doctor may prescribe a shot of epinephrine to carry with you in case you have a severe reaction. Learn how to give yourself the shot and keep it with you at all times. Make sure it is not . · Go to the emergency room every time you have a severe reaction, even if you have used your shot of epinephrine and are feeling better. Symptoms can come back after a shot. · Wear medical alert jewelry that lists your allergies. You can buy this at most drugstores. · If your child has a severe allergy, make sure that his or her teachers, babysitters, coaches, and other caregivers know about the allergy. They should have an epinephrine shot, know how and when to give it, and have a plan to take your child to the hospital.  When should you call for help? Give an epinephrine shot if:    · You think you are having a severe allergic reaction.     · You have symptoms in more than one body area, such as mild nausea and an itchy mouth.    After giving an epinephrine shot call 911, even if you feel better.   Call 911 if:    · You have symptoms of a severe allergic reaction. These may include:  ¨ Sudden raised, red areas (hives) all over your body. ¨ Swelling of the throat, mouth, lips, or tongue. ¨ Trouble breathing. ¨ Passing out (losing consciousness). Or you may feel very lightheaded or suddenly feel weak, confused, or restless.     · You have been given an epinephrine shot, even if you feel better.    Call your doctor now or seek immediate medical care if:    · You have symptoms of an allergic reaction, such as:  ¨ A rash or hives (raised, red areas on the skin). ¨ Itching. ¨ Swelling. ¨ Belly pain, nausea, or vomiting.    Watch closely for changes in your health, and be sure to contact your doctor if:    · You do not get better as expected. Where can you learn more? Go to http://dee-chuy.info/. Enter R743 in the search box to learn more about \"Allergic Reaction: Care Instructions. \"  Current as of: October 6, 2017  Content Version: 11.7  © 2437-0912 [employer]. Care instructions adapted under license by [employer] (which disclaims liability or warranty for this information).  If you have questions about a medical condition or this instruction, always ask your healthcare professional. [employer] disclaims any warranty or liability for your use of this information.

## 2018-07-27 NOTE — ED PROVIDER NOTES
EMERGENCY DEPARTMENT HISTORY AND PHYSICAL EXAM 
 
Date: 7/27/2018 Patient Name: Cedric Estevez History of Presenting Illness Chief Complaint Patient presents with  Vaginal Itching Pt c/o vaginal irritation after using a condum with lubication History Provided By: Patient HPI: Cedric Estevez is a 20 y.o. female with a PMH of asthma who presents with labial itching and vaginal irritation after using a latex condom, pt is allergic to latex, for the past 3 days. Pt denies any urinary symptoms, vaginal discharge, sob,  Lip/tongue swelling, chest pain, dizziness, lightheadedness, syncope, abdominal pain, n/v/d or fever/chills. Pt has not taken any medication for her symptoms All other ROS negative at this time Pt is in no acute distress and is speaking in full sentences PCP: Ed Puckett MD 
 
Current Outpatient Prescriptions Medication Sig Dispense Refill  Cetirizine (ZYRTEC) 10 mg cap Take 10 mg by mouth daily. 7 Cap 0  
 diphenhydrAMINE (BENADRYL) 25 mg capsule Take 1 Cap by mouth every six (6) hours as needed. 20 Cap 0  
 ALBUTEROL IN Take  by inhalation.  fluticasone (FLONASE) 50 mcg/actuation nasal spray 2 Sprays by Both Nostrils route daily. 1 Bottle 0  
 loratadine-pseudoephedrine (CLARITIN-D 12 HOUR) 5-120 mg per tablet Take 1 Tab by mouth two (2) times a day. 20 Tab 0  ibuprofen (MOTRIN) 600 mg tablet Take 1 Tab by mouth every six (6) hours as needed for Pain. 20 Tab 0  
 famotidine (PEPCID) 20 mg tablet Take 0.5 Tabs by mouth daily. 3 Tab 0 Past History Past Medical History: 
Past Medical History:  
Diagnosis Date  Asthma Past Surgical History: 
Past Surgical History:  
Procedure Laterality Date  HX ORTHOPAEDIC ACL repaired Family History: No family history on file. Social History: 
Social History Substance Use Topics  Smoking status: Never Smoker  Smokeless tobacco: Never Used  Alcohol use No Allergies: Allergies Allergen Reactions  Latex Other (comments) Skin irritation  Amoxicillin Itching  Pcn [Penicillins] Hives  Shellfish Derived Itching Review of Systems Review of Systems Constitutional: Negative. Negative for chills and fever. HENT: Negative. Negative for trouble swallowing. Eyes: Negative. Respiratory: Negative. Negative for cough, chest tightness and shortness of breath. Cardiovascular: Negative. Negative for chest pain. Gastrointestinal: Negative. Negative for abdominal pain, diarrhea, nausea and vomiting. Endocrine: Negative. Genitourinary: Negative. Negative for dysuria, frequency, urgency, vaginal bleeding, vaginal discharge and vaginal pain (vaginal  irritation ). Musculoskeletal: Negative. Negative for back pain and myalgias. Skin: Negative. Allergic/Immunologic: Negative. Neurological: Negative. Negative for headaches. Hematological: Negative. Psychiatric/Behavioral: Negative. All other systems reviewed and are negative. Physical Exam  
 
Vitals:  
 07/27/18 1818 BP: 107/60 Pulse: 79 Resp: 16 Temp: 97.9 °F (36.6 °C) SpO2: 99% Weight: 50.8 kg (112 lb) Height: 5' 8\" (1.727 m) Physical Exam  
Constitutional: She is oriented to person, place, and time. She appears well-developed and well-nourished. No distress. HENT:  
Head: Normocephalic and atraumatic. Right Ear: External ear normal.  
Left Ear: External ear normal.  
Eyes: Conjunctivae and EOM are normal. Pupils are equal, round, and reactive to light. Neck: Normal range of motion. No tracheal deviation present. Cardiovascular: Normal rate, regular rhythm and normal heart sounds. Pulmonary/Chest: Effort normal and breath sounds normal. No respiratory distress. She has no wheezes. Abdominal: Soft. Bowel sounds are normal. She exhibits no distension. There is no tenderness.  There is no rebound, no CVA tenderness, no tenderness at McBurney's point and negative Rodriguez's sign. Genitourinary:  
 
 
There is no rash, tenderness, lesion or injury on the right labia. There is no tenderness, lesion or injury on the left labia. Musculoskeletal: Normal range of motion. She exhibits no edema, tenderness or deformity. Lymphadenopathy:  
  She has no cervical adenopathy. Neurological: She is alert and oriented to person, place, and time. She has normal reflexes. Skin: Skin is warm and dry. She is not diaphoretic. No pallor. Psychiatric: She has a normal mood and affect. Her behavior is normal. Judgment and thought content normal.  
Nursing note and vitals reviewed. Diagnostic Study Results Labs - No results found for this or any previous visit (from the past 12 hour(s)). Radiologic Studies - No orders to display CT Results  (Last 48 hours) None CXR Results  (Last 48 hours) None Medical Decision Making I am the first provider for this patient. I reviewed the vital signs, available nursing notes, past medical history, past surgical history, family history and social history. Vital Signs-Reviewed the patient's vital signs. Records Reviewed: Nursing Notes and Old Medical Records ED Course:  
 
Disposition: 
discharge DISCHARGE NOTE:  
 
 
  Care plan outlined and precautions discussed. Patient has no new complaints, changes, or physical findings. Results of visit were reviewed with the patient. All medications were reviewed with the patient; will d/c home with rx. All of pt's questions and concerns were addressed. Patient was instructed and agrees to follow up with pcp, as well as to return to the ED upon further deterioration. Patient is ready to go home. Follow-up Information Follow up With Details Comments Contact Info Roopa Sampson MD Schedule an appointment as soon as possible for a visit in 3 days If symptoms worsen [address] [place] [phone] 
[phone] Current Discharge Medication List  
  
START taking these medications Details Cetirizine (ZYRTEC) 10 mg cap Take 10 mg by mouth daily. Qty: 7 Cap, Refills: 0 Provider Notes (Medical Decision Making): DDX: vaginal irritation, vaginal discharge Advised pt to always check labels prior to using condoms Worsening si/sxs discussed extensively Follow up with PCP or RTC if symptoms/signs worsen Side effects of medication discussed Education materials provided at discharge Pt verbalizes agreement with plan 
 
 
Procedures Diagnosis Clinical Impression: 1. Vaginal irritation 2. Allergic reaction, initial encounter

## 2018-07-27 NOTE — ED NOTES
Emergency Department Nursing Plan of Care The Nursing Plan of Care is developed from the Nursing assessment and Emergency Department Attending provider initial evaluation. The plan of care may be reviewed in the ED Provider note. The Plan of Care was developed with the following considerations:  
Patient / Family readiness to learn indicated by:verbalized understanding Persons(s) to be included in education: patient Barriers to Learning/Limitations:No 
 
Signed Arlina Boas 7/27/2018   7:25 PM

## 2018-08-09 ENCOUNTER — HOSPITAL ENCOUNTER (EMERGENCY)
Age: 23
Discharge: HOME | End: 2018-08-09
Attending: EMERGENCY MEDICINE
Payer: COMMERCIAL

## 2018-08-09 VITALS
BODY MASS INDEX: 18.44 KG/M2 | DIASTOLIC BLOOD PRESSURE: 90 MMHG | OXYGEN SATURATION: 100 % | RESPIRATION RATE: 16 BRPM | SYSTOLIC BLOOD PRESSURE: 138 MMHG | TEMPERATURE: 98.8 F | HEIGHT: 68 IN | HEART RATE: 82 BPM | WEIGHT: 121.69 LBS

## 2018-08-09 DIAGNOSIS — N93.9 ABNORMAL VAGINAL BLEEDING: Primary | ICD-10-CM

## 2018-08-09 LAB
APPEARANCE UR: CLEAR
BACTERIA URNS QL MICRO: NEGATIVE /HPF
BILIRUB UR QL: NEGATIVE
COLOR UR: NORMAL
EPITH CASTS URNS QL MICRO: NORMAL /LPF
GLUCOSE UR STRIP.AUTO-MCNC: NEGATIVE MG/DL
HCG UR QL: NEGATIVE
HGB UR QL STRIP: NEGATIVE
HYALINE CASTS URNS QL MICRO: NORMAL /LPF (ref 0–5)
KETONES UR QL STRIP.AUTO: NEGATIVE MG/DL
LEUKOCYTE ESTERASE UR QL STRIP.AUTO: NEGATIVE
NITRITE UR QL STRIP.AUTO: NEGATIVE
PH UR STRIP: 6 [PH] (ref 5–8)
PROT UR STRIP-MCNC: NEGATIVE MG/DL
RBC #/AREA URNS HPF: NORMAL /HPF (ref 0–5)
SP GR UR REFRACTOMETRY: 1.03 (ref 1–1.03)
UA: UC IF INDICATED,UAUC: NORMAL
UROBILINOGEN UR QL STRIP.AUTO: 0.2 EU/DL (ref 0.2–1)
WBC URNS QL MICRO: NORMAL /HPF (ref 0–4)

## 2018-08-09 PROCEDURE — 81025 URINE PREGNANCY TEST: CPT

## 2018-08-09 PROCEDURE — 99283 EMERGENCY DEPT VISIT LOW MDM: CPT

## 2018-08-09 PROCEDURE — 81001 URINALYSIS AUTO W/SCOPE: CPT | Performed by: EMERGENCY MEDICINE

## 2018-08-09 NOTE — ED NOTES
Complaint of bright red blood per vagina. LMP: 07/20/18 approx. Pt is not due until approx 08/20. Pt is also having minor abdominal cramping.

## 2018-08-09 NOTE — ED NOTES
Beatriz Ng PA-C reviewed discharge instructions with the patient. The patient verbalized understanding. All questions and concerns were addressed. The patient declined a wheelchair and is discharged ambulatory in the care of family members with instructions and prescriptions in hand. Pt is alert and oriented x 4. Respirations are clear and unlabored.

## 2018-08-09 NOTE — DISCHARGE INSTRUCTIONS
Abnormal Uterine Bleeding: Care Instructions  Your Care Instructions    Abnormal uterine bleeding (AUB) is irregular bleeding from the uterus that is longer or heavier than usual or does not occur at your regular time. Sometimes it is caused by changes in hormone levels. It can also be caused by growths in the uterus, such as fibroids or polyps. Sometimes a cause cannot be found. You may have heavy bleeding when you are not expecting your period. Your doctor may suggest a pregnancy test, if you think you are pregnant. Follow-up care is a key part of your treatment and safety. Be sure to make and go to all appointments, and call your doctor if you are having problems. It's also a good idea to know your test results and keep a list of the medicines you take. How can you care for yourself at home? · Be safe with medicines. Take pain medicines exactly as directed. ¨ If the doctor gave you a prescription medicine for pain, take it as prescribed. ¨ If you are not taking a prescription pain medicine, ask your doctor if you can take an over-the-counter medicine. · You may be low in iron because of blood loss. Eat a balanced diet that is high in iron and vitamin C. Foods rich in iron include red meat, shellfish, eggs, beans, and leafy green vegetables. Talk to your doctor about whether you need to take iron pills or a multivitamin. When should you call for help? Call 911 anytime you think you may need emergency care. For example, call if:    · You passed out (lost consciousness).    Call your doctor now or seek immediate medical care if:    · You have new or worse belly or pelvic pain.     · You have severe vaginal bleeding.     · You feel dizzy or lightheaded, or you feel like you may faint.    Watch closely for changes in your health, and be sure to contact your doctor if:    · You think you may be pregnant.     · Your bleeding gets worse.     · You do not get better as expected.    Where can you learn more?  Go to http://dee-chuy.info/. Enter B034 in the search box to learn more about \"Abnormal Uterine Bleeding: Care Instructions. \"  Current as of: October 6, 2017  Content Version: 11.7  © 6099-7103 [employer]. Care instructions adapted under license by [email or web address] (which disclaims liability or warranty for this information). If you have questions about a medical condition or this instruction, always ask your healthcare professional. [place] [address] any warranty or liability for your use of this information.

## 2018-08-09 NOTE — ED PROVIDER NOTES
EMERGENCY DEPARTMENT HISTORY AND PHYSICAL EXAM      Date: 8/9/2018  Patient Name: Fatoumata Ovalles    History of Presenting Illness     Chief Complaint   Patient presents with    Vaginal Bleeding     pt reports vaginal bleeding (spotting), reports LMP was end of July, also has lower abdominal pain       History Provided By: Patient    HPI: Fatoumata Ovalles, 20 y.o. female presents to the ED with cc of vaginal spotting. Pt notes that her LNMP was just several weeks ago at the end of July. She also has mild lower abdominal pain. She denies Hx of same. She denies urinary complaints. There is no vaginal itching, discharge or burning. Pt is sexually active but has no concern for STI. There are no other complaints, changes, or physical findings at this time. Social Hx: Tobacco (denies), EtOH (denies), Illicit drug use (denies)     PCP: Milan Pradhan MD   GYN: Located at [facility] (name unknown)    Current Outpatient Prescriptions   Medication Sig Dispense Refill    Cetirizine (ZYRTEC) 10 mg cap Take 10 mg by mouth daily. 7 Cap 0    diphenhydrAMINE (BENADRYL) 25 mg capsule Take 1 Cap by mouth every six (6) hours as needed. 20 Cap 0    ALBUTEROL IN Take  by inhalation.  fluticasone (FLONASE) 50 mcg/actuation nasal spray 2 Sprays by Both Nostrils route daily. 1 Bottle 0    loratadine-pseudoephedrine (CLARITIN-D 12 HOUR) 5-120 mg per tablet Take 1 Tab by mouth two (2) times a day. 20 Tab 0    ibuprofen (MOTRIN) 600 mg tablet Take 1 Tab by mouth every six (6) hours as needed for Pain. 20 Tab 0    famotidine (PEPCID) 20 mg tablet Take 0.5 Tabs by mouth daily. 3 Tab 0       Past History     Past Medical History:  Past Medical History:   Diagnosis Date    Asthma        Past Surgical History:  Past Surgical History:   Procedure Laterality Date    HX ORTHOPAEDIC      ACL repaired       Family History:  History reviewed. No pertinent family history.     Social History:  Social History   Substance Use Topics    Smoking status: Never Smoker    Smokeless tobacco: Never Used    Alcohol use No       Allergies: Allergies   Allergen Reactions    Latex Other (comments)     Skin irritation    Amoxicillin Itching    Pcn [Penicillins] Hives    Shellfish Derived Itching         Review of Systems   Review of Systems   Constitutional: Negative for chills, diaphoresis and fever. HENT: Negative for congestion, ear pain, rhinorrhea and sore throat. Respiratory: Negative for cough and shortness of breath. Cardiovascular: Negative for chest pain. Gastrointestinal: Negative for abdominal pain, constipation, diarrhea, nausea and vomiting. Genitourinary: Positive for vaginal bleeding. Negative for difficulty urinating, dysuria, frequency and hematuria. Musculoskeletal: Negative for arthralgias and myalgias. Neurological: Negative for headaches. All other systems reviewed and are negative. Physical Exam   Physical Exam   Constitutional: She is oriented to person, place, and time. She appears well-developed and well-nourished. No distress. 20 y. o. -American female    HENT:   Head: Normocephalic and atraumatic. Eyes: Conjunctivae are normal. Right eye exhibits no discharge. Left eye exhibits no discharge. Neck: Normal range of motion. Neck supple. Cardiovascular: Normal rate, regular rhythm and normal heart sounds. No murmur heard. Pulmonary/Chest: Effort normal and breath sounds normal. No respiratory distress. Abdominal: Soft. Normal appearance and bowel sounds are normal. She exhibits no distension. There is tenderness in the suprapubic area. There is no rebound and no guarding. Neurological: She is alert and oriented to person, place, and time. Skin: Skin is warm and dry. She is not diaphoretic. Psychiatric: She has a normal mood and affect. Her behavior is normal.   Nursing note and vitals reviewed.       Diagnostic Study Results     Labs -     Recent Results (from the past 12 hour(s))   HCG URINE, QL. - POC    Collection Time: 08/09/18  2:37 PM   Result Value Ref Range    Pregnancy test,urine (POC) NEGATIVE  NEG     URINALYSIS W/ REFLEX CULTURE    Collection Time: 08/09/18  2:38 PM   Result Value Ref Range    Color YELLOW/STRAW      Appearance CLEAR CLEAR      Specific gravity 1.028 1.003 - 1.030      pH (UA) 6.0 5.0 - 8.0      Protein NEGATIVE  NEG mg/dL    Glucose NEGATIVE  NEG mg/dL    Ketone NEGATIVE  NEG mg/dL    Bilirubin NEGATIVE  NEG      Blood NEGATIVE  NEG      Urobilinogen 0.2 0.2 - 1.0 EU/dL    Nitrites NEGATIVE  NEG      Leukocyte Esterase NEGATIVE  NEG      WBC 0-4 0 - 4 /hpf    RBC 0-5 0 - 5 /hpf    Epithelial cells FEW FEW /lpf    Bacteria NEGATIVE  NEG /hpf    UA:UC IF INDICATED CULTURE NOT INDICATED BY UA RESULT CNI      Hyaline cast 0-2 0 - 5 /lpf       Radiologic Studies - None    Medical Decision Making   I am the first provider for this patient. I reviewed the vital signs, available nursing notes, past medical history, past surgical history, family history and social history. Vital Signs-Reviewed the patient's vital signs. Patient Vitals for the past 12 hrs:   Temp Pulse Resp BP SpO2   08/09/18 1427 98.8 °F (37.1 °C) 82 16 138/90 100 %     Records Reviewed: Nursing Notes    Provider Notes (Medical Decision Making):   Pregnancy and/or related complications, abnormal vaginal bleeding, menses     ED Course:   Initial assessment performed. The patients presenting problems have been discussed, and they are in agreement with the care plan formulated and outlined with them. I have encouraged them to ask questions as they arise throughout their visit. Critical Care Time:   none    Disposition:  DISCHARGE NOTE:  4:00 PM  The pt is ready for discharge. The pt's signs, symptoms, diagnosis, and discharge instructions have been discussed and pt has conveyed their understanding. The pt is to follow up as recommended or return to ER should their symptoms worsen. Plan has been discussed and pt is in agreement. PLAN:  1. Current Discharge Medication List        2. Follow-up Information     Follow up With Details Comments Zari Chavarria MD In 1 week As needed [phone]  [phone]      Your GYN at [facility] In 1 week As needed         Return to ED if worse     Diagnosis     Clinical Impression:   1.  Abnormal vaginal bleeding

## 2018-09-20 ENCOUNTER — HOSPITAL ENCOUNTER (EMERGENCY)
Age: 23
Discharge: HOME | End: 2018-09-20
Attending: EMERGENCY MEDICINE
Payer: COMMERCIAL

## 2018-09-20 VITALS
WEIGHT: 110 LBS | HEART RATE: 65 BPM | TEMPERATURE: 99 F | HEIGHT: 68 IN | DIASTOLIC BLOOD PRESSURE: 76 MMHG | SYSTOLIC BLOOD PRESSURE: 108 MMHG | BODY MASS INDEX: 16.67 KG/M2 | OXYGEN SATURATION: 100 % | RESPIRATION RATE: 18 BRPM

## 2018-09-20 DIAGNOSIS — B96.89 BV (BACTERIAL VAGINOSIS): Primary | ICD-10-CM

## 2018-09-20 DIAGNOSIS — N76.0 BV (BACTERIAL VAGINOSIS): Primary | ICD-10-CM

## 2018-09-20 LAB
APPEARANCE UR: CLEAR
BACTERIA URNS QL MICRO: NEGATIVE /HPF
BILIRUB UR QL: NEGATIVE
CLUE CELLS VAG QL WET PREP: NORMAL
COLOR UR: NORMAL
EPITH CASTS URNS QL MICRO: NORMAL /LPF
GLUCOSE UR STRIP.AUTO-MCNC: NEGATIVE MG/DL
HCG UR QL: NEGATIVE
HGB UR QL STRIP: NEGATIVE
KETONES UR QL STRIP.AUTO: NEGATIVE MG/DL
KOH PREP SPEC: NORMAL
LEUKOCYTE ESTERASE UR QL STRIP.AUTO: NEGATIVE
NITRITE UR QL STRIP.AUTO: NEGATIVE
PH UR STRIP: 6.5 [PH] (ref 5–8)
PROT UR STRIP-MCNC: NEGATIVE MG/DL
RBC #/AREA URNS HPF: NORMAL /HPF (ref 0–5)
SERVICE CMNT-IMP: NORMAL
SP GR UR REFRACTOMETRY: 1.02 (ref 1–1.03)
T VAGINALIS VAG QL WET PREP: NORMAL
UA: UC IF INDICATED,UAUC: NORMAL
UROBILINOGEN UR QL STRIP.AUTO: 0.2 EU/DL (ref 0.2–1)
WBC URNS QL MICRO: NORMAL /HPF (ref 0–4)

## 2018-09-20 PROCEDURE — 74011250636 HC RX REV CODE- 250/636: Performed by: PHYSICIAN ASSISTANT

## 2018-09-20 PROCEDURE — 96372 THER/PROPH/DIAG INJ SC/IM: CPT

## 2018-09-20 PROCEDURE — 99284 EMERGENCY DEPT VISIT MOD MDM: CPT

## 2018-09-20 PROCEDURE — 81025 URINE PREGNANCY TEST: CPT

## 2018-09-20 PROCEDURE — 81001 URINALYSIS AUTO W/SCOPE: CPT | Performed by: EMERGENCY MEDICINE

## 2018-09-20 PROCEDURE — 87491 CHLMYD TRACH DNA AMP PROBE: CPT | Performed by: PHYSICIAN ASSISTANT

## 2018-09-20 PROCEDURE — 87210 SMEAR WET MOUNT SALINE/INK: CPT | Performed by: PHYSICIAN ASSISTANT

## 2018-09-20 PROCEDURE — 74011250637 HC RX REV CODE- 250/637: Performed by: PHYSICIAN ASSISTANT

## 2018-09-20 RX ORDER — METRONIDAZOLE 500 MG/1
500 TABLET ORAL 2 TIMES DAILY
Qty: 14 TAB | Refills: 0 | Status: SHIPPED | OUTPATIENT
Start: 2018-09-20 | End: 2018-09-27

## 2018-09-20 RX ORDER — AZITHROMYCIN 500 MG/1
1000 TABLET, FILM COATED ORAL
Status: COMPLETED | OUTPATIENT
Start: 2018-09-20 | End: 2018-09-20

## 2018-09-20 RX ADMIN — LIDOCAINE HYDROCHLORIDE 250 MG: 10 INJECTION, SOLUTION EPIDURAL; INFILTRATION; INTRACAUDAL; PERINEURAL at 20:41

## 2018-09-20 RX ADMIN — AZITHROMYCIN 1000 MG: 500 TABLET, FILM COATED ORAL at 20:43

## 2018-09-20 NOTE — LETTER
[facility] EMERGENCY DEPT 
[address] [facility] [address] [id number] [phone] Work/School Note Date: 9/20/2018 To Whom It May concern: 
 
Miguel Baugh was seen and treated today in the emergency room by the following provider(s): 
Attending Provider: Vivian Alexander MD 
Physician Assistant: Teresa Figueredo. Miguel Baugh may return to work on 9/21/2018. Sincerely, KAREN Figueredo

## 2018-09-20 NOTE — ED PROVIDER NOTES
EMERGENCY DEPARTMENT HISTORY AND PHYSICAL EXAM 
 
 
Date: 9/20/2018 Patient Name: Michelle Medrano History of Presenting Illness Chief Complaint Patient presents with  Vaginal Discharge  Abdominal Pain History Provided By: Patient HPI: Michelle Medrano, 20 y.o. female with PMHx significant for asthma, presents ambulatory to the ED with cc of new onset, odorous vaginal discharge and constant, mild, dull abdominal pain starting 1 hour ago. The pt states that she is does not think she has a STD but would still like to be tested. The pt specifically denies experiencing dysuria, vaginal bleeding, vaginal pain, nausea, or vomiting. Pt reports frequent history BV. PMHx: asthma PSHx: ACL repair SHx: - EtOH, - tobacco, - illicit drugs There are no other complaints, changes, or physical findings at this time. PCP: Kb Martinez MD 
 
Current Outpatient Prescriptions Medication Sig Dispense Refill  metroNIDAZOLE (FLAGYL) 500 mg tablet Take 1 Tab by mouth two (2) times a day for 7 days. 14 Tab 0  
 Cetirizine (ZYRTEC) 10 mg cap Take 10 mg by mouth daily. 7 Cap 0  
 diphenhydrAMINE (BENADRYL) 25 mg capsule Take 1 Cap by mouth every six (6) hours as needed. 20 Cap 0  
 ALBUTEROL IN Take  by inhalation.  fluticasone (FLONASE) 50 mcg/actuation nasal spray 2 Sprays by Both Nostrils route daily. 1 Bottle 0  
 loratadine-pseudoephedrine (CLARITIN-D 12 HOUR) 5-120 mg per tablet Take 1 Tab by mouth two (2) times a day. 20 Tab 0  ibuprofen (MOTRIN) 600 mg tablet Take 1 Tab by mouth every six (6) hours as needed for Pain. 20 Tab 0  
 famotidine (PEPCID) 20 mg tablet Take 0.5 Tabs by mouth daily. 3 Tab 0 Past History Past Medical History: 
Past Medical History:  
Diagnosis Date  Asthma Past Surgical History: 
Past Surgical History:  
Procedure Laterality Date  HX ORTHOPAEDIC  2012  
 left ACL repaired Family History: No family history on file. Social History: 
Social History Substance Use Topics  Smoking status: Never Smoker  Smokeless tobacco: Never Used  Alcohol use No  
 
 
Allergies: Allergies Allergen Reactions  Latex Other (comments) Skin irritation  Amoxicillin Itching  Pcn [Penicillins] Hives  Shellfish Derived Itching Review of Systems Review of Systems Constitutional: Negative for chills and fever. Respiratory: Negative for shortness of breath. Cardiovascular: Negative for chest pain. Gastrointestinal: Positive for abdominal pain. Negative for nausea and vomiting. Genitourinary: Positive for vaginal discharge (with odor). Negative for dysuria, flank pain, vaginal bleeding and vaginal pain.  
     -vaginal itching Musculoskeletal: Negative for back pain and myalgias. Skin: Negative for color change, pallor, rash and wound. Neurological: Negative for dizziness, weakness and light-headedness. All other systems reviewed and are negative. Physical Exam  
Physical Exam  
Constitutional: She is oriented to person, place, and time. She appears well-developed and well-nourished. No distress. HENT:  
Head: Normocephalic and atraumatic. Eyes: Conjunctivae are normal.  
Cardiovascular: Normal rate, regular rhythm and normal heart sounds. Pulmonary/Chest: Effort normal and breath sounds normal. No respiratory distress. Abdominal: Soft. Bowel sounds are normal. She exhibits no distension. Genitourinary: Cervix exhibits no motion tenderness. Vaginal discharge (small amount of thick, white discharge) found. Genitourinary Comments: No odor. Cervical os closed. Mild cervicitis. Musculoskeletal: Normal range of motion. Neurological: She is alert and oriented to person, place, and time. Skin: Skin is warm. No rash noted. Psychiatric: She has a normal mood and affect. Her behavior is normal.  
Nursing note and vitals reviewed. Diagnostic Study Results Labs - 
  
 Recent Results (from the past 12 hour(s)) URINALYSIS W/ REFLEX CULTURE Collection Time: 09/20/18  8:14 PM  
Result Value Ref Range Color YELLOW/STRAW Appearance CLEAR CLEAR Specific gravity 1.025 1.003 - 1.030    
 pH (UA) 6.5 5.0 - 8.0 Protein NEGATIVE  NEG mg/dL Glucose NEGATIVE  NEG mg/dL Ketone NEGATIVE  NEG mg/dL Bilirubin NEGATIVE  NEG Blood NEGATIVE  NEG Urobilinogen 0.2 0.2 - 1.0 EU/dL Nitrites NEGATIVE  NEG Leukocyte Esterase NEGATIVE  NEG    
 WBC 0-4 0 - 4 /hpf  
 RBC 0-5 0 - 5 /hpf Epithelial cells FEW FEW /lpf Bacteria NEGATIVE  NEG /hpf  
 UA:UC IF INDICATED CULTURE NOT INDICATED BY UA RESULT CNI    
KOH, OTHER SOURCES Collection Time: 09/20/18  8:14 PM  
Result Value Ref Range Special Requests: NO SPECIAL REQUESTS    
 KOH NO YEAST SEEN    
WET PREP Collection Time: 09/20/18  8:14 PM  
Result Value Ref Range Clue cells CLUE CELLS PRESENT Wet prep NO TRICHOMONAS SEEN    
HCG URINE, QL. - POC Collection Time: 09/20/18  8:20 PM  
Result Value Ref Range Pregnancy test,urine (POC) NEGATIVE  NEG Radiologic Studies - Medical Decision Making I am the first provider for this patient. I reviewed the vital signs, available nursing notes, past medical history, past surgical history, family history and social history. Vital Signs-Reviewed the patient's vital signs. Patient Vitals for the past 12 hrs: 
 Temp Pulse Resp BP SpO2  
09/20/18 1934 99 °F (37.2 °C) 65 18 108/76 100 % Records Reviewed: Nursing Notes and Old Medical Records Provider Notes (Medical Decision Making): DDx: gonorrhea, chlamydia, BV, yeast, trichomoniasis, UTI, pregnancy, PID 
 
ED Course:  
Initial assessment performed. The patients presenting problems have been discussed, and they are in agreement with the care plan formulated and outlined with them. I have encouraged them to ask questions as they arise throughout their visit. Procedure Note - Pelvic Exam:   
7:57 PM 
Performed by: Marcellus Riedel. Terry Etienne PA-C Chaperoned by: no female nurses available Pelvic exam was performed using bimanual and speculum. Further findings noted in physical exam.  
The procedure took 1-15 minutes, and pt tolerated well. Critical Care Time: 0 minutes Disposition: 
DISCHARGE NOTE 
8:56 PM 
The patient has been re-evaluated and is ready for discharge. Reviewed available results with patient. Counseled pt on diagnosis and care plan. Pt has expressed understanding, and all questions have been answered. Pt agrees with plan and agrees to F/U as recommended, or return to the ED if their sxs worsen. Discharge instructions have been provided and explained to the pt, along with reasons to return to the ED. Written by [facility], ED Scribe, as dictated by Marcellus Riedel Lindon Fortis, PA-C. PLAN: 
1. Current Discharge Medication List  
  
START taking these medications Details  
metroNIDAZOLE (FLAGYL) 500 mg tablet Take 1 Tab by mouth two (2) times a day for 7 days. Qty: 14 Tab, Refills: 0  
  
  
 
2. Follow-up Information Follow up With Details Comments Contact Info Francis Wiggins MD Schedule an appointment as soon as possible for a visit As needed [address] 
[phone] Return to ED if worse Diagnosis Clinical Impression: 1. BV (bacterial vaginosis) Attestation: This note is prepared by [facility], acting as Scribe for Marcellus Riedel. Terry Etienne PA-C. Nhung Etienne PA-C: The scribe's documentation has been prepared under my direction and personally reviewed by me in its entirety. I confirm that the note above accurately reflects all work, treatment, procedures, and medical decision making performed by me.

## 2018-09-21 NOTE — DISCHARGE INSTRUCTIONS
Bacterial Vaginosis: Care Instructions  Your Care Instructions    Bacterial vaginosis is a type of vaginal infection. It is caused by excess growth of certain bacteria that are normally found in the vagina. Symptoms can include itching, swelling, pain when you urinate or have sex, and a gray or yellow discharge with a \"fishy\" odor. It is not considered an infection that is spread through sexual contact. Although symptoms can be annoying and uncomfortable, bacterial vaginosis does not usually cause other health problems. However, if you have it while you are pregnant, it can cause complications. While the infection may go away on its own, most doctors use antibiotics to treat it. You may have been prescribed pills or vaginal cream. With treatment, bacterial vaginosis usually clears up in 5 to 7 days. Follow-up care is a key part of your treatment and safety. Be sure to make and go to all appointments, and call your doctor if you are having problems. It's also a good idea to know your test results and keep a list of the medicines you take. How can you care for yourself at home? · Take your antibiotics as directed. Do not stop taking them just because you feel better. You need to take the full course of antibiotics. · Do not eat or drink anything that contains alcohol if you are taking metronidazole (Flagyl). · Keep using your medicine if you start your period. Use pads instead of tampons while using a vaginal cream or suppository. Tampons can absorb the medicine. · Wear loose cotton clothing. Do not wear nylon and other materials that hold body heat and moisture close to the skin. · Do not scratch. Relieve itching with a cold pack or a cool bath. · Do not wash your vaginal area more than once a day. Use plain water or a mild, unscented soap. Do not douche. When should you call for help?   Watch closely for changes in your health, and be sure to contact your doctor if:    · You have unexpected vaginal bleeding.     · You have a fever.     · You have new or increased pain in your vagina or pelvis.     · You are not getting better after 1 week.     · Your symptoms return after you finish the course of your medicine. Where can you learn more? Go to http://dee-chuy.info/. Zayda Fierro in the search box to learn more about \"Bacterial Vaginosis: Care Instructions. \"  Current as of: October 6, 2017  Content Version: 11.7  © 0621-7131 [employer], [employer]. Care instructions adapted under license by [employer] (which disclaims liability or warranty for this information). If you have questions about a medical condition or this instruction, always ask your healthcare professional. [address] any warranty or liability for your use of this information.

## 2018-09-23 ENCOUNTER — HOSPITAL ENCOUNTER (EMERGENCY)
Age: 23
Discharge: HOME | End: 2018-09-23
Attending: EMERGENCY MEDICINE
Payer: COMMERCIAL

## 2018-09-23 VITALS
OXYGEN SATURATION: 100 % | HEART RATE: 69 BPM | HEIGHT: 68 IN | TEMPERATURE: 98.5 F | DIASTOLIC BLOOD PRESSURE: 72 MMHG | BODY MASS INDEX: 18.49 KG/M2 | WEIGHT: 122 LBS | SYSTOLIC BLOOD PRESSURE: 111 MMHG | RESPIRATION RATE: 16 BRPM

## 2018-09-23 DIAGNOSIS — L73.9 FOLLICULITIS: Primary | ICD-10-CM

## 2018-09-23 PROCEDURE — 99282 EMERGENCY DEPT VISIT SF MDM: CPT

## 2018-09-23 RX ORDER — MUPIROCIN 20 MG/G
OINTMENT TOPICAL 3 TIMES DAILY
Qty: 22 G | Refills: 0 | Status: SHIPPED | OUTPATIENT
Start: 2018-09-23 | End: 2018-10-03

## 2018-09-23 NOTE — ED NOTES
Patient presents to ED with c/o bump on vagina. Patient states that she recently got waxed 3 weeks ago and not the hair is growing back, this morning a bump appeared that is painful to touch. Denies concern for STD. Emergency Department Nursing Plan of Care The Nursing Plan of Care is developed from the Nursing assessment and Emergency Department Attending provider initial evaluation. The plan of care may be reviewed in the ED Provider note. The Plan of Care was developed with the following considerations:  
Patient / Family readiness to learn indicated by:verbalized understanding Persons(s) to be included in education: patient Barriers to Learning/Limitations:No 
 
Signed Chin Healy RN   
9/23/2018   2:06 AM

## 2018-09-23 NOTE — DISCHARGE INSTRUCTIONS
Folliculitis: Care Instructions  Your Care Instructions    Folliculitis (say \"glr-NTA-mjy-LY-tus\") is an infection of the pouches (follicles) in the skin where hair grows. It can occur on any part of the body, but it is most common on the scalp, face, armpits, and groin. Bacteria, such as those found in a hot tub, can cause folliculitis. Folliculitis begins as a red, tender area near a strand of hair. The skin can itch or burn and may drain pus or blood. Sometimes folliculitis can lead to more serious skin infections. Your doctor usually can treat mild folliculitis with an antibiotic cream or ointment. If you have folliculitis on your scalp, you may use a shampoo that kills bacteria. Antibiotics you take as pills can treat infections deeper in the skin. For stubborn cases of folliculitis, laser treatment may be an option. Laser treatment uses strong beams of light to destroy the hair follicle. But hair will no longer grow in the treated area. Follow-up care is a key part of your treatment and safety. Be sure to make and go to all appointments, and call your doctor if you are having problems. It's also a good idea to know your test results and keep a list of the medicines you take. How can you care for yourself at home? · Take your medicine exactly as prescribed. If your doctor prescribed antibiotics, take them as directed. Do not stop taking them just because you feel better. You need to take the full course of antibiotics. · Use a soap that kills bacteria to wash the infected area. If your scalp or beard is infected, use a shampoo with selenium or propylene glycol. Be careful. Do not scrub too long or too hard. · Mix 1 1/3 cup warm water and 1 tablespoon vinegar. Soak a cloth in the mixture, and place it over the infected skin until it cools off (usually 5 to 10 minutes). You can do this 3 to 6 times a day. · Do not share your razor, towel, or washcloth. That can spread folliculitis.   · Use a new blade in your razor each time you shave to keep from re-infecting your skin. · If you tend to get folliculitis, avoid using hot tubs. They can contain bacteria that cause folliculitis. When should you call for help? Call your doctor now or seek immediate medical care if:    · You have symptoms of infection, such as:  ¨ Increased pain, swelling, warmth, or redness. ¨ Red streaks leading from the area. ¨ Pus draining from the area. ¨ A fever.    Watch closely for changes in your health, and be sure to contact your doctor if:    · You do not get better as expected. Where can you learn more? Go to http://dee-chuy.info/. Enter M257 in the search box to learn more about \"Folliculitis: Care Instructions. \"  Current as of: October 5, 2017  Content Version: 11.7  © 9365-3328 [employer]. Care instructions adapted under license by [employer] (which disclaims liability or warranty for this information). If you have questions about a medical condition or this instruction, always ask your healthcare professional. [address] any warranty or liability for your use of this information.

## 2018-09-23 NOTE — ED NOTES
Patient educated on discharge instructions and 1 prescriptions . Patient verbalized understanding of education. Patient given discharge instructions and 1 prescriptions. Patient ambulated out of ED. No acute distress noted.

## 2018-09-23 NOTE — ED PROVIDER NOTES
EMERGENCY DEPARTMENT HISTORY AND PHYSICAL EXAM 
 
 
Date: 9/23/2018 Patient Name: Juan Amraal History of Presenting Illness Chief Complaint Patient presents with  
 Skin Problem Patient says she has a  \"bump\" in her vagina. History Provided By: Patient HPI: Juan Amaral, 20 y.o. female with PMHx significant for Asthma, presents ambulatory to the ED with cc of bumps on vagina beginning today with no other associated sxs. Pt reports she got waxed three weeks ago and the hair is now growing back. She states she has no concern for STD's. She denies any other alleviating or exacerbating factors. She specifically denies any fever, chills, vaginal discharge, or dysuria. Chief Complaint: Bumps on vagina Duration: 1 Days Timing:  N/A Location: Vagina Quality: None Severity: Mild Modifying Factors: None Associated Symptoms: denies any other associated signs or symptoms There are no other complaints, changes, or physical findings at this time. PCP: Felicitas Bloch, MD 
 
Current Outpatient Prescriptions Medication Sig Dispense Refill  mupirocin (BACTROBAN) 2 % ointment Apply  to affected area three (3) times daily for 10 days. Apply to affected area 22 g 0  
 metroNIDAZOLE (FLAGYL) 500 mg tablet Take 1 Tab by mouth two (2) times a day for 7 days. 14 Tab 0  ALBUTEROL IN Take  by inhalation.  Cetirizine (ZYRTEC) 10 mg cap Take 10 mg by mouth daily. 7 Cap 0  
 diphenhydrAMINE (BENADRYL) 25 mg capsule Take 1 Cap by mouth every six (6) hours as needed. 20 Cap 0  
 fluticasone (FLONASE) 50 mcg/actuation nasal spray 2 Sprays by Both Nostrils route daily. 1 Bottle 0  
 loratadine-pseudoephedrine (CLARITIN-D 12 HOUR) 5-120 mg per tablet Take 1 Tab by mouth two (2) times a day. 20 Tab 0  ibuprofen (MOTRIN) 600 mg tablet Take 1 Tab by mouth every six (6) hours as needed for Pain.  20 Tab 0  
  famotidine (PEPCID) 20 mg tablet Take 0.5 Tabs by mouth daily. 3 Tab 0 Past History Past Medical History: 
Past Medical History:  
Diagnosis Date  Asthma Past Surgical History: 
Past Surgical History:  
Procedure Laterality Date  HX ORTHOPAEDIC  2012  
 left ACL repaired Family History: 
History reviewed. No pertinent family history. Social History: 
Social History Substance Use Topics  Smoking status: Never Smoker  Smokeless tobacco: Never Used  Alcohol use No  
 
 
Allergies: Allergies Allergen Reactions  Latex Other (comments) Skin irritation  Amoxicillin Itching  Pcn [Penicillins] Hives  Shellfish Derived Itching Review of Systems Review of Systems Constitutional: Negative for chills, fatigue and fever. HENT: Negative for congestion, ear pain and rhinorrhea. Eyes: Negative for pain and visual disturbance. Respiratory: Negative for cough and shortness of breath. Cardiovascular: Negative for chest pain and leg swelling. Gastrointestinal: Negative for abdominal pain, diarrhea, nausea and vomiting. Genitourinary: Negative for dysuria, flank pain and vaginal discharge. Musculoskeletal: Negative for back pain and neck pain. Skin: Negative for rash and wound. +bumps on vagina Neurological: Negative for dizziness, syncope and headaches. Psychiatric/Behavioral: Negative for self-injury and suicidal ideas. Physical Exam  
Physical Exam 
 
GENERAL: alert and oriented, no acute distress EYES: PEERL, No injection, discharge or icterus. ENT: Mucous membranes pink and moist. 
NECK: Supple LUNGS: Airway patent. Non-labored respirations. Breath sounds clear with good air entry bilaterally. HEART: Regular rate and rhythm. No peripheral edema ABDOMEN: Non-distended and non-tender, without guarding or rebound. SKIN:  warm, dry EXTREMITIES: Without swelling, tenderness or deformity, symmetric with normal ROM NEUROLOGICAL: Alert, oriented : +Small area on the left labia majora with tenderness and erythema. No fluctuants or induration. Medical Decision Making I am the first provider for this patient. I reviewed the vital signs, available nursing notes, past medical history, past surgical history, family history and social history. Vital Signs-Reviewed the patient's vital signs. Patient Vitals for the past 12 hrs: 
 Temp Pulse Resp BP SpO2  
09/23/18 0137 98.5 °F (36.9 °C) 69 16 111/72 100 % Records Reviewed: Nursing Notes and Old Medical Records Provider Notes (Medical Decision Making): On presentation the patient is well appearing, in no acute distress with normal vital signs. Based on the history and exam the differential diagnosis for this patient includes cellulitis, folliculitis, abscess. Exam findings c/w likely folliculitis without and findings to suggest labial abscess at this time. Will prescribe topical abx and instructed to f/u with her OBGYN. . 
 
ED Course:  
Initial assessment performed. The patients presenting problems have been discussed, and they are in agreement with the care plan formulated and outlined with them. I have encouraged them to ask questions as they arise throughout their visit. Procedure Note - Pelvic Exam:   
2:09 AM 
Performed by: Gabbie Weiss. Marlyn Babinski, MD 
Chaperoned by: Darlene Smallwood Pelvic exam was performed with visual inspection, there is some erythema on the left labia majora, no induration or fluctuance, no drainage. The procedure took 1-15 minutes, and pt tolerated well. Disposition: 
DISCHARGE NOTE 
2:16 AM 
The patient has been re-evaluated and is ready for discharge. Reviewed available results with patient and family. Counseled pt and family on diagnosis and care plan. Pt and family have expressed understanding, and all questions have been answered.  Pt and family agree with plan and agree to F/U as recommended, or return to the ED if their sxs worsen. Discharge instructions have been provided and explained to the pt and their family, along with reasons to return to the ED. Written by Bryon Siegel, ED Scribe, as dictated by Owen Mckeon MD. 
 
PLAN: 
1. Discharge Medication List as of 9/23/2018  2:13 AM  
  
START taking these medications Details  
mupirocin (BACTROBAN) 2 % ointment Apply  to affected area three (3) times daily for 10 days. Apply to affected area, Normal, Disp-22 g, R-0  
  
  
CONTINUE these medications which have NOT CHANGED Details  
metroNIDAZOLE (FLAGYL) 500 mg tablet Take 1 Tab by mouth two (2) times a day for 7 days. , Normal, Disp-14 Tab, R-0  
  
ALBUTEROL IN Take  by inhalation. , Historical Med Cetirizine (ZYRTEC) 10 mg cap Take 10 mg by mouth daily. , Normal, Disp-7 Cap, R-0  
  
diphenhydrAMINE (BENADRYL) 25 mg capsule Take 1 Cap by mouth every six (6) hours as needed., Normal, Disp-20 Cap, R-0  
  
fluticasone (FLONASE) 50 mcg/actuation nasal spray 2 Sprays by Both Nostrils route daily. , Normal, Disp-1 Bottle, R-0  
  
loratadine-pseudoephedrine (CLARITIN-D 12 HOUR) 5-120 mg per tablet Take 1 Tab by mouth two (2) times a day., Normal, Disp-20 Tab, R-0  
  
ibuprofen (MOTRIN) 600 mg tablet Take 1 Tab by mouth every six (6) hours as needed for Pain., Normal, Disp-20 Tab, R-0  
  
famotidine (PEPCID) 20 mg tablet Take 0.5 Tabs by mouth daily. , Normal, Disp-3 Tab, R-0  
  
  
 
2. Follow-up Information Follow up With Details Comments Contact Info Zev Dawson MD In 2 days  [address] [place] [address] 
[phone] Return to ED if worse Diagnosis Clinical Impression: 1. Folliculitis Attestations: This note is prepared by Bryon Siegel, acting as Scribe for Misbah Tom. Jose Mckeon MD. Owen Mckeon MD: The scribe's documentation has been prepared under my direction and personally reviewed by me in its entirety. I confirm that the note above accurately reflects all work, treatment, procedures, and medical decision making performed by me. This note will not be viewable in [address]

## 2018-10-24 ENCOUNTER — HOSPITAL ENCOUNTER (EMERGENCY)
Age: 23
Discharge: HOME | End: 2018-10-25
Attending: EMERGENCY MEDICINE
Payer: COMMERCIAL

## 2018-10-24 DIAGNOSIS — R10.9 ABDOMINAL PAIN AFFECTING PREGNANCY: ICD-10-CM

## 2018-10-24 DIAGNOSIS — Z3A.01 7 WEEKS GESTATION OF PREGNANCY: Primary | ICD-10-CM

## 2018-10-24 DIAGNOSIS — K59.00 CONSTIPATION, UNSPECIFIED CONSTIPATION TYPE: ICD-10-CM

## 2018-10-24 DIAGNOSIS — N39.0 URINARY TRACT INFECTION WITHOUT HEMATURIA, SITE UNSPECIFIED: ICD-10-CM

## 2018-10-24 DIAGNOSIS — O26.899 ABDOMINAL PAIN AFFECTING PREGNANCY: ICD-10-CM

## 2018-10-24 PROCEDURE — 99284 EMERGENCY DEPT VISIT MOD MDM: CPT

## 2018-10-24 NOTE — LETTER
[address] 
[facility] 
[address] [id number] 
[phone] Work/School Note Date: 10/24/2018 To Whom It May concern: 
 
Flower Nielsen was seen and treated today in the emergency room by the following provider(s): 
Attending Provider: Ashia Medel MD.   
 
Flower Nielsen may return to work on 10/27/2018. Sincerely, Viki Lee

## 2018-10-25 ENCOUNTER — APPOINTMENT (OUTPATIENT)
Dept: GENERAL RADIOLOGY | Age: 23
End: 2018-10-25
Attending: PHYSICIAN ASSISTANT
Payer: COMMERCIAL

## 2018-10-25 ENCOUNTER — APPOINTMENT (OUTPATIENT)
Dept: ULTRASOUND IMAGING | Age: 23
End: 2018-10-25
Attending: EMERGENCY MEDICINE
Payer: COMMERCIAL

## 2018-10-25 VITALS
RESPIRATION RATE: 16 BRPM | WEIGHT: 120.15 LBS | SYSTOLIC BLOOD PRESSURE: 130 MMHG | OXYGEN SATURATION: 99 % | HEART RATE: 78 BPM | BODY MASS INDEX: 18.21 KG/M2 | TEMPERATURE: 97.9 F | HEIGHT: 68 IN | DIASTOLIC BLOOD PRESSURE: 82 MMHG

## 2018-10-25 LAB
ANION GAP SERPL CALC-SCNC: 8 MMOL/L (ref 5–15)
APPEARANCE UR: CLEAR
BACTERIA URNS QL MICRO: ABNORMAL /HPF
BILIRUB UR QL: NEGATIVE
BUN SERPL-MCNC: 9 MG/DL (ref 6–20)
BUN/CREAT SERPL: 12 (ref 12–20)
CALCIUM SERPL-MCNC: 9 MG/DL (ref 8.5–10.1)
CHLORIDE SERPL-SCNC: 105 MMOL/L (ref 97–108)
CLUE CELLS VAG QL WET PREP: NORMAL
CO2 SERPL-SCNC: 23 MMOL/L (ref 21–32)
COLOR UR: YELLOW
CREAT SERPL-MCNC: 0.73 MG/DL (ref 0.55–1.02)
EPITH CASTS URNS QL MICRO: ABNORMAL /LPF
ERYTHROCYTE [DISTWIDTH] IN BLOOD BY AUTOMATED COUNT: 16.4 % (ref 11.5–14.5)
GLUCOSE SERPL-MCNC: 93 MG/DL (ref 65–100)
GLUCOSE UR STRIP.AUTO-MCNC: NEGATIVE MG/DL
HCG SERPL-ACNC: ABNORMAL MIU/ML (ref 0–6)
HCG UR QL: POSITIVE
HCT VFR BLD AUTO: 36.6 % (ref 35–47)
HGB BLD-MCNC: 11.4 G/DL (ref 11.5–16)
HGB UR QL STRIP: NEGATIVE
KETONES UR QL STRIP.AUTO: NEGATIVE MG/DL
KOH PREP SPEC: NORMAL
LEUKOCYTE ESTERASE UR QL STRIP.AUTO: NEGATIVE
MCH RBC QN AUTO: 27.5 PG (ref 26–34)
MCHC RBC AUTO-ENTMCNC: 31.1 G/DL (ref 30–36.5)
MCV RBC AUTO: 88.2 FL (ref 80–99)
NITRITE UR QL STRIP.AUTO: NEGATIVE
NRBC # BLD: 0 K/UL (ref 0–0.01)
NRBC BLD-RTO: 0 PER 100 WBC
PH UR STRIP: 6 [PH] (ref 5–8)
PLATELET # BLD AUTO: 296 K/UL (ref 150–400)
PMV BLD AUTO: 10.7 FL (ref 8.9–12.9)
POTASSIUM SERPL-SCNC: 3.5 MMOL/L (ref 3.5–5.1)
PROT UR STRIP-MCNC: NEGATIVE MG/DL
RBC # BLD AUTO: 4.15 M/UL (ref 3.8–5.2)
RBC #/AREA URNS HPF: ABNORMAL /HPF (ref 0–5)
SERVICE CMNT-IMP: NORMAL
SODIUM SERPL-SCNC: 136 MMOL/L (ref 136–145)
SP GR UR REFRACTOMETRY: 1.03 (ref 1–1.03)
SP GR UR REFRACTOMETRY: 1.03 (ref 1–1.03)
T VAGINALIS VAG QL WET PREP: NORMAL
UA: UC IF INDICATED,UAUC: ABNORMAL
UROBILINOGEN UR QL STRIP.AUTO: 0.2 EU/DL (ref 0.2–1)
WBC # BLD AUTO: 17.4 K/UL (ref 3.6–11)
WBC URNS QL MICRO: ABNORMAL /HPF (ref 0–4)

## 2018-10-25 PROCEDURE — 80048 BASIC METABOLIC PNL TOTAL CA: CPT | Performed by: PHYSICIAN ASSISTANT

## 2018-10-25 PROCEDURE — 76700 US EXAM ABDOM COMPLETE: CPT

## 2018-10-25 PROCEDURE — 87210 SMEAR WET MOUNT SALINE/INK: CPT | Performed by: EMERGENCY MEDICINE

## 2018-10-25 PROCEDURE — 36415 COLL VENOUS BLD VENIPUNCTURE: CPT | Performed by: PHYSICIAN ASSISTANT

## 2018-10-25 PROCEDURE — 85027 COMPLETE CBC AUTOMATED: CPT | Performed by: PHYSICIAN ASSISTANT

## 2018-10-25 PROCEDURE — 76817 TRANSVAGINAL US OBSTETRIC: CPT

## 2018-10-25 PROCEDURE — 87086 URINE CULTURE/COLONY COUNT: CPT | Performed by: PHYSICIAN ASSISTANT

## 2018-10-25 PROCEDURE — 84702 CHORIONIC GONADOTROPIN TEST: CPT | Performed by: EMERGENCY MEDICINE

## 2018-10-25 PROCEDURE — 81025 URINE PREGNANCY TEST: CPT

## 2018-10-25 PROCEDURE — 81001 URINALYSIS AUTO W/SCOPE: CPT | Performed by: PHYSICIAN ASSISTANT

## 2018-10-25 PROCEDURE — 87491 CHLMYD TRACH DNA AMP PROBE: CPT | Performed by: EMERGENCY MEDICINE

## 2018-10-25 RX ORDER — NITROFURANTOIN 25; 75 MG/1; MG/1
100 CAPSULE ORAL 2 TIMES DAILY
Qty: 10 CAP | Refills: 0 | Status: SHIPPED | OUTPATIENT
Start: 2018-10-25 | End: 2018-10-30

## 2018-10-25 RX ORDER — DOCUSATE SODIUM 100 MG/1
100 CAPSULE, LIQUID FILLED ORAL 2 TIMES DAILY
Qty: 60 CAP | Refills: 2 | Status: SHIPPED | OUTPATIENT
Start: 2018-10-25 | End: 2019-01-23

## 2018-10-25 RX ORDER — DICYCLOMINE HYDROCHLORIDE 10 MG/1
10 CAPSULE ORAL 3 TIMES DAILY
Qty: 15 CAP | Refills: 0 | Status: SHIPPED | OUTPATIENT
Start: 2018-10-25 | End: 2018-10-30

## 2018-10-25 NOTE — ED PROVIDER NOTES
EMERGENCY DEPARTMENT HISTORY AND PHYSICAL EXAM 
     
 
Date: 10/24/2018 Patient Name: Valentina Calderón History of Presenting Illness Chief Complaint Patient presents with  Abdominal Pain Pt ambulatory to triage with lower abdominal pain x 3 days; pt states LBM x yesterday am described as \"hard\" stool; denies blood; nausea, no vomiting; denies urinary sx's History Provided By: Patient and Patient's Mother HPI: Valentina Calderón is a 20 y.o. female, pmhx BV and constipation, who presents ambulatory to the ED c/o LLQ pain x 3 days. Pt has been constipated. Pt tried milk of magnesia but has just had watery/runny stool come out. Pt \"thinks I wiped too hard\" and hurt the skin near her rectum. She denies blood in her stool. She denies new sex partners. She denies urinary or vaginal symptoms. LMP was last month and her next period is due next week. She also is sore under her breasts. She denies rashes. No history of abdominal surgery. She specifically denies any recent fevers, chills, nausea, vomiting, CP, urinary sxs,, or headache. She denies history of diabetes, kidney disease, liver disease, thyroid disease PCP: Garett Albarado MD 
 
There are no other complaints, changes, or physical findings at this time. Current Outpatient Medications Medication Sig Dispense Refill  nitrofurantoin, macrocrystal-monohydrate, (MACROBID) 100 mg capsule Take 1 Cap by mouth two (2) times a day for 5 days. 10 Cap 0  
 dicyclomine (BENTYL) 10 mg capsule Take 1 Cap by mouth three (3) times daily for 5 days. 15 Cap 0  
 PNV with Ca #68-Iron-FA #1-DHA (PNV-OMEGA) 28-1-300 mg cap Take 1 Cap by mouth daily. 20 Cap 0  
 docusate sodium (COLACE) 100 mg capsule Take 1 Cap by mouth two (2) times a day for 90 days. 60 Cap 2  
 Cetirizine (ZYRTEC) 10 mg cap Take 10 mg by mouth daily.  7 Cap 0  
 diphenhydrAMINE (BENADRYL) 25 mg capsule Take 1 Cap by mouth every six (6) hours as needed. 20 Cap 0  
 ALBUTEROL IN Take  by inhalation. Past History Past Medical History: 
Past Medical History:  
Diagnosis Date  Asthma Past Surgical History: 
Past Surgical History:  
Procedure Laterality Date  HX ORTHOPAEDIC  2012  
 left ACL repaired Family History: 
History reviewed. No pertinent family history. Social History: 
Social History Tobacco Use  Smoking status: Never Smoker  Smokeless tobacco: Never Used Substance Use Topics  Alcohol use: No  
 Drug use: No  
 
 
Allergies: Allergies Allergen Reactions  Latex Other (comments) Skin irritation  Amoxicillin Itching  Pcn [Penicillins] Hives  Shellfish Derived Itching Review of Systems Review of Systems Constitutional: Negative for activity change, appetite change, fatigue and fever. HENT: Negative for congestion, dental problem, ear pain, rhinorrhea and sinus pressure. Eyes: Negative for photophobia, pain, discharge, redness, itching and visual disturbance. Respiratory: Negative for cough, chest tightness, shortness of breath, wheezing and stridor. Cardiovascular: Negative for chest pain and leg swelling. Gastrointestinal: Positive for abdominal pain, constipation and diarrhea. Negative for abdominal distention, anal bleeding, blood in stool, nausea, rectal pain and vomiting. Genitourinary: Negative for decreased urine volume, difficulty urinating, dysuria, flank pain, frequency, genital sores, hematuria, menstrual problem, pelvic pain, urgency, vaginal bleeding, vaginal discharge and vaginal pain. Musculoskeletal: Negative for arthralgias, back pain, gait problem, joint swelling and neck pain. Skin: Negative for rash and wound. Neurological: Negative for dizziness, syncope, weakness, numbness and headaches. Psychiatric/Behavioral: Negative for behavioral problems. The patient is not nervous/anxious.    
  
Physical Exam  
Physical Exam  
 Constitutional: She is oriented to person, place, and time. Vital signs are normal. She appears well-developed and well-nourished. No distress. HENT:  
Head: Normocephalic and atraumatic. Right Ear: External ear normal.  
Left Ear: External ear normal.  
Nose: Nose normal.  
Mouth/Throat: Oropharynx is clear and moist. No oropharyngeal exudate. Eyes: Conjunctivae and EOM are normal. Pupils are equal, round, and reactive to light. Right eye exhibits no discharge. Left eye exhibits no discharge. No scleral icterus. Neck: Normal range of motion. Neck supple. No tracheal deviation present. Cardiovascular: Normal rate, regular rhythm, normal heart sounds and intact distal pulses. Exam reveals no gallop and no friction rub. No murmur heard. Pulmonary/Chest: Effort normal and breath sounds normal. No respiratory distress. She has no wheezes. She has no rales. She exhibits no tenderness. Abdominal: Soft. Bowel sounds are normal. She exhibits no distension and no mass. There is tenderness in the left lower quadrant. There is no rigidity, no rebound, no guarding, no tenderness at McBurney's point and negative Rodriguez's sign. Genitourinary: Vagina normal.  
 
 
There is breast tenderness. Pelvic exam was performed with patient in the knee-chest position. Genitourinary Comments: Slight TTP under bilateral breasts. No calor. No erythema. No rashes. No discharge from nipples. PELVIC EXAM: 
No external lesions or exudates. Mucosa is pink and moist. White exudate in canal. There is no blood in canal. The cervix is without erythema/lesions. The os is closed and has mucous in it. No tenderness of the uterus/adnexa on bimanual exam. No CMT. Musculoskeletal: She exhibits no edema or tenderness. Lymphadenopathy:  
  She has no cervical adenopathy. Neurological: She is alert and oriented to person, place, and time. No cranial nerve deficit. Skin: Skin is warm and dry. Laceration noted. No rash noted. No erythema. See  exam. 3mm superficial skin tear to the proximal left thigh in gluteal cleft. No active bleeding. No red streaks. No pus. Slight TTP. Psychiatric: She has a normal mood and affect. Her behavior is normal.  
Nursing note and vitals reviewed. Diagnostic Study Results Labs - Recent Results (from the past 12 hour(s)) CBC W/O DIFF Collection Time: 10/25/18 12:03 AM  
Result Value Ref Range WBC 17.4 (H) 3.6 - 11.0 K/uL  
 RBC 4.15 3.80 - 5.20 M/uL  
 HGB 11.4 (L) 11.5 - 16.0 g/dL HCT 36.6 35.0 - 47.0 % MCV 88.2 80.0 - 99.0 FL  
 MCH 27.5 26.0 - 34.0 PG  
 MCHC 31.1 30.0 - 36.5 g/dL  
 RDW 16.4 (H) 11.5 - 14.5 % PLATELET 024 551 - 706 K/uL MPV 10.7 8.9 - 12.9 FL  
 NRBC 0.0 0  WBC ABSOLUTE NRBC 0.00 0.00 - 0.01 K/uL METABOLIC PANEL, BASIC Collection Time: 10/25/18 12:03 AM  
Result Value Ref Range Sodium 136 136 - 145 mmol/L Potassium 3.5 3.5 - 5.1 mmol/L Chloride 105 97 - 108 mmol/L  
 CO2 23 21 - 32 mmol/L Anion gap 8 5 - 15 mmol/L Glucose 93 65 - 100 mg/dL BUN 9 6 - 20 MG/DL Creatinine 0.73 0.55 - 1.02 MG/DL  
 BUN/Creatinine ratio 12 12 - 20 GFR est AA >60 >60 ml/min/1.73m2 GFR est non-AA >60 >60 ml/min/1.73m2 Calcium 9.0 8.5 - 10.1 MG/DL  
BETA HCG, QT Collection Time: 10/25/18 12:03 AM  
Result Value Ref Range Beta HCG, ,385 (H) 0 - 6 MIU/ML  
URINALYSIS W/ REFLEX CULTURE Collection Time: 10/25/18 12:37 AM  
Result Value Ref Range Color YELLOW Appearance CLEAR CLEAR Specific gravity 1.030 1.003 - 1.030 Specific gravity 1.030 1.003 - 1.030    
 pH (UA) 6.0 5.0 - 8.0 Protein NEGATIVE  NEG mg/dL Glucose NEGATIVE  NEG mg/dL Ketone NEGATIVE  NEG mg/dL Bilirubin NEGATIVE  NEG Blood NEGATIVE  NEG Urobilinogen 0.2 0.2 - 1.0 EU/dL Nitrites NEGATIVE  NEG  Leukocyte Esterase NEGATIVE  NEG    
 WBC 5-10 0 - 4 /hpf  
 RBC 0-5 0 - 5 /hpf Epithelial cells FEW FEW /lpf Bacteria 2+ (A) NEG /hpf  
 UA:UC IF INDICATED URINE CULTURE ORDERED (A) CNI    
HCG URINE, QL. - POC Collection Time: 10/25/18 12:39 AM  
Result Value Ref Range Pregnancy test,urine (POC) POSITIVE (A) NEG    
KOH, OTHER SOURCES Collection Time: 10/25/18  1:07 AM  
Result Value Ref Range Special Requests: NO SPECIAL REQUESTS    
 KOH NO YEAST SEEN    
WET PREP Collection Time: 10/25/18  1:07 AM  
Result Value Ref Range Clue cells CLUE CELLS ABSENT Wet prep NO TRICHOMONAS SEEN Radiologic Studies -  
US ABD COMP Final Result [address] Final Result XR ABD FLAT/ ERECT    (Results Pending) CT Results  (Last 48 hours) None CXR Results  (Last 48 hours) None Medical Decision Making I am the first provider for this patient. I reviewed the vital signs, available nursing notes, past medical history, past surgical history, family history and social history. Vital Signs-Reviewed the patient's vital signs. Patient Vitals for the past 12 hrs: 
 Temp Pulse Resp BP SpO2  
10/25/18 0200    130/82 99 % 10/25/18 0114  78 16 134/81 100 % 10/24/18 2324 97.9 °F (36.6 °C) 72 16  100 % Records Reviewed: Nursing Notes, Old Medical Records, Previous Radiology Studies and Previous Laboratory Studies Provider Notes (Medical Decision Making): DDx: constipation, appendicitis, pregnancy, BV,  
 
ED Course:  
Initial assessment performed. The patients presenting problems have been discussed, and they are in agreement with the care plan formulated and outlined with them. I have encouraged them to ask questions as they arise throughout their visit. PROGRESS NOTE: 
ED Course as of Oct 25 0819 Thu Oct 25, 2018  
7107 Discussed case and new pregnancy information with Dr. Benjamín Yan. He ordered additional tests. Discussed findings with pt.  She has never  been pregnant, and did not suspect that she was. She needs a few minutes before pelvic exam.   [TC] ED Course User Index KAREN Ramírez  
 
1:19 AM 
Turning over care to Dr. Kathy Hernandez. Pt is aware. Progress Note: 
3:27 AM 
Given abnormal UA with elevated white count, will give dose of ABX for UTI. Pelvic labs are negative. US demonstrates a viable IUP at 7 weeks and 4 days. Pt is ready for discharge. Written by Catie Dvais, ED Scribe, as dictated by Troy Boyd MD. 
 
3:30 AM 
Pt noted to be feeling better, ready for discharge. Updated pt and/or family on all lab and imaging findings available. Will follow up as instructed. All questions have been answered, pt voiced understanding and agreement with plan. Abx were prescribed, pt advised that diarrhea and rash are possible side effects of the medications. Specific return precautions provided as well as instructions to return to the ED should sx worsen at any time. Vital signs stable for discharge. WICHO Pinedo Disposition: 
 
DISCHARGE NOTE: 
3:30 AM 
The patient's results have been reviewed with family and/or caregiver. They verbally convey their understanding and agreement of the patient's signs, symptoms, diagnosis, treatment, and prognosis. They additionally agree to follow up as recommended in the discharge instructions or to return to the Emergency Room should the patient's condition change prior to their follow-up appointment. The family and/or caregiver verbally agrees with the care-plan and all of their questions have been answered. The discharge instructions have also been provided to the them along with educational information regarding the patient's diagnosis and a list of reasons why the patient would want to return to the ER prior to their follow-up appointment should their condition change. WICHO Pinedo PLAN: 
1. Discharge Medication List as of 10/25/2018  3:34 AM  
  
START taking these medications Details nitrofurantoin, macrocrystal-monohydrate, (MACROBID) 100 mg capsule Take 1 Cap by mouth two (2) times a day for 5 days. , Print, Disp-10 Cap, R-0  
  
dicyclomine (BENTYL) 10 mg capsule Take 1 Cap by mouth three (3) times daily for 5 days. , Print, Disp-15 Cap, R-0  
  
PNV with Ca #68-Iron-FA #1-DHA (PNV-OMEGA) 28-1-300 mg cap Take 1 Cap by mouth daily. , Print, Disp-20 Cap, R-0  
  
  
CONTINUE these medications which have NOT CHANGED Details Cetirizine (ZYRTEC) 10 mg cap Take 10 mg by mouth daily. , Normal, Disp-7 Cap, R-0  
  
diphenhydrAMINE (BENADRYL) 25 mg capsule Take 1 Cap by mouth every six (6) hours as needed., Normal, Disp-20 Cap, R-0  
  
ALBUTEROL IN Take  by inhalation. , Historical Med 2. Follow-up Information Follow up With Specialties Details Why Contact Info  
 daniel Johnson, [address] [place] [address] [address] 
[phone] [facility] EMERGENCY DEPT Emergency Medicine  As needed, If symptoms worsen [address] [facility] 
[phone] Return to ED if worse Diagnosis Clinical Impression: 1. 7 weeks gestation of pregnancy 2. Abdominal pain affecting pregnancy 3. Urinary tract infection without hematuria, site unspecified 4. Constipation, unspecified constipation type [facility] to [facility] with JOANNA:  Attending Attestation Neela Mcdermott, 20 y.o. female with PMHx significant for BV and constipation c/o of progressive LLQ pain x 72 hours with associated constipation. General: NAD HEENT: EOMI, non-icteric sclera Chest: RRR, no m/r/g Lungs: CTAB Abd: soft, nondistended, min TTP LLQ with deep palpation; nontender at McBurney's; negative Rodriguez's, suprapubic fullness, no CVAT, +bs. No guarding or rebound. Ext: no peripheral edema, no cyanosis, +2 peripheral pulses Skin: no rashes or lesions Neuro: no focal deficits Impression/Plan: 20 y.o. female presenting with lower abdominal pain; found to have positive UPT; TVUS showing IUP; pt without bleeding concerns; pt able to tolerate PO and will f/u with [facility]; will treat UTI, given PNV and stool softeners. 
  
I have personally seen and examined the patient; I have reviewed the JOANNA's note and agree with findings and plan. I was present during the key portions of separately billed procedures. Paty Martini MD 
 
 
 
 
 
 
 
This note will not be viewable in [address]

## 2018-10-25 NOTE — ED NOTES
MD Eb Moya reviewed discharge instructions with the patient. The patient verbalized understanding. All questions and concerns were addressed. The patient declined a wheelchair and is discharged ambulatory in the care of family members with instructions and prescriptions in hand. Pt is alert and oriented x 4. Respirations are clear and unlabored.

## 2018-10-25 NOTE — ED NOTES
Assumed care of pt from triage. Pt is A&O x 4. Pt reports CC of abdominal pain x 3 days. Pt reports hx of constipation and reports she had a BM yesterday that was hard followed by loose stool. Pt denies CP or SOB. Pt denies N/V. Pt resting on stretcher in POC.

## 2018-10-25 NOTE — DISCHARGE INSTRUCTIONS
Thank you for allowing us to take care of you today! We hope we addressed all of your concerns and needs. We strive to provide excellent quality care in the Emergency Department. You will receive a survey after your visit to evaluate the care you were provided. Should you receive a survey from us, we invite you to share your experience and tell us what made it excellent. It was a pleasure serving you, we invite you to share your experience with us, in our pursuit for excellence, should you be selected to receive a survey. The exam and treatment you received in the Emergency Department were for an urgent problem and are not intended as complete care. It is important that you follow up with a doctor, nurse practitioner, or physician assistant for ongoing care. If your symptoms become worse or you do not improve as expected and you are unable to reach your usual health care provider, you should return to the Emergency Department. We are available 24 hours a day. Please take your discharge instructions with you when you go to your follow-up appointment. If you have any problem arranging a follow-up appointment, contact the Emergency Department immediately. If a prescription has been provided, please have it filled as soon as possible to prevent a delay in treatment. Read the entire medication instruction sheet provided to you by the pharmacy. If you have any questions or reservations about taking the medication due to side effects or interactions with other medications, please call your primary care physician or contact the ER to speak with the charge nurse. Make an appointment with your family doctor or the physician you were referred to for follow-up of this visit as instructed on your discharge paperwork, as this is mandatory follow-up. Return to the ER if you are unable to be seen or if you are unable to be seen in a timely manner.     If you have any problem arranging the follow-up visit, contact the Emergency Department immediately. I hope you feel better and thank you again for allow us to provide you with excellent care today at [facility]! Warmest regards,    Alexandria Baker MD  Emergency Medicine Physician  [facility]           Urinary Tract Infection in Pregnancy: Care Instructions  Your Care Instructions    A urinary tract infection, or UTI, is an infection of the bladder and other urinary structures. Most UTIs occur in the bladder. They often cause pain or burning when you urinate. UTI is the most common bacterial infection in pregnancy. If untreated, a UTI could lead to problems such as a kidney infection or  labor. Most UTIs can be cured with antibiotics. Your doctor will prescribe an antibiotic that is safe during pregnancy. Be sure to finish your medicine so that the infection does not spread to your kidneys. Follow-up care is a key part of your treatment and safety. Be sure to make and go to all appointments, and call your doctor if you are having problems. It's also a good idea to know your test results and keep a list of the medicines you take. How can you care for yourself at home? · Take your antibiotics as directed. Do not stop taking them just because you feel better. You need to take the full course of antibiotics. · Drink extra water and other fluids for the next day or two. This will help wash out the bacteria causing the infection. If you have kidney, heart, or liver disease and have to limit fluids, talk with your doctor before you increase the amount of fluids you drink. · Do not drink alcohol. · Urinate often. Try to empty your bladder each time. Preventing UTIs  · Drink plenty of fluids, enough so that your urine is light yellow or clear like water. This helps you urinate often, which clears bacteria from your system.  If you have kidney, heart, or liver disease and have to limit fluids, talk with your doctor before you increase the amount of fluids you drink. · Urinate when you first have the urge. · Urinate right after you have sex. This is the best way for women to avoid UTIs. · When going to the bathroom, wipe from front to back to keep bacteria from entering the vagina or urethra. When should you call for help? Call your doctor now or seek immediate medical care if:    · You have symptoms of a worse urinary tract infection. These may include:  ? Pain or burning when you urinate. ? A frequent need to urinate without being able to pass much urine. ? Pain in the flank, which is just below the rib cage and above the waist on either side of the back. ? Blood in your urine. ? A fever.    Watch closely for changes in your health, and be sure to contact your doctor if:    · You do not get better as expected. Where can you learn more? Go to http://dee-chuy.info/. Enter M982 in the search box to learn more about \"Urinary Tract Infection in Pregnancy: Care Instructions. \"  Current as of: November 21, 2017  Content Version: 11.8  © 5279-1131 "[employer]. Care instructions adapted under license by [employer] (which disclaims liability or warranty for this information). If you have questions about a medical condition or this instruction, always ask your healthcare professional. [address] any warranty or liability for your use of this information. Abdominal Pain: Care Instructions  Your Care Instructions    Abdominal pain has many possible causes. Some aren't serious and get better on their own in a few days. Others need more testing and treatment. If your pain continues or gets worse, you need to be rechecked and may need more tests to find out what is wrong. You may need surgery to correct the problem.   Don't ignore new symptoms, such as fever, nausea and vomiting, urination problems, pain that gets worse, and dizziness. These may be signs of a more serious problem. Your doctor may have recommended a follow-up visit in the next 8 to 12 hours. If you are not getting better, you may need more tests or treatment. The doctor has checked you carefully, but problems can develop later. If you notice any problems or new symptoms, get medical treatment right away. Follow-up care is a key part of your treatment and safety. Be sure to make and go to all appointments, and call your doctor if you are having problems. It's also a good idea to know your test results and keep a list of the medicines you take. How can you care for yourself at home? · Rest until you feel better. · To prevent dehydration, drink plenty of fluids, enough so that your urine is light yellow or clear like water. Choose water and other caffeine-free clear liquids until you feel better. If you have kidney, heart, or liver disease and have to limit fluids, talk with your doctor before you increase the amount of fluids you drink. · If your stomach is upset, eat mild foods, such as rice, dry toast or crackers, bananas, and applesauce. Try eating several small meals instead of two or three large ones. · Wait until 48 hours after all symptoms have gone away before you have spicy foods, alcohol, and drinks that contain caffeine. · Do not eat foods that are high in fat. · Avoid anti-inflammatory medicines such as aspirin, ibuprofen (Advil, Motrin), and naproxen (Aleve). These can cause stomach upset. Talk to your doctor if you take daily aspirin for another health problem. When should you call for help? Call 911 anytime you think you may need emergency care.  For example, call if:    · You passed out (lost consciousness).     · You pass maroon or very bloody stools.     · You vomit blood or what looks like coffee grounds.     · You have new, severe belly pain.    Call your doctor now or seek immediate medical care if:    · Your pain gets worse, especially if it becomes focused in one area of your belly.     · You have a new or higher fever.     · Your stools are black and look like tar, or they have streaks of blood.     · You have unexpected vaginal bleeding.     · You have symptoms of a urinary tract infection. These may include:  ? Pain when you urinate. ? Urinating more often than usual.  ? Blood in your urine.     · You are dizzy or lightheaded, or you feel like you may faint.    Watch closely for changes in your health, and be sure to contact your doctor if:    · You are not getting better after 1 day (24 hours). Where can you learn more? Go to http://dee-chuy.info/. Enter N749 in the search box to learn more about \"Abdominal Pain: Care Instructions. \"  Current as of: November 20, 2017  Content Version: 11.8  © 8166-9107 [employer]. Care instructions adapted under license by [employer] (which disclaims liability or warranty for this information). If you have questions about a medical condition or this instruction, always ask your healthcare professional. [address] any warranty or liability for your use of this information. Belly Pain in Pregnancy: Care Instructions  Your Care Instructions  When you're pregnant, any belly pain can be a worry. You may not want to call your doctor about every pain you have. But you don't want to miss something that is dangerous for you or your baby. Even if it feels familiar, belly pain can mean something new when you're pregnant. It's important to know when to call your doctor. It will also help to know how to care for yourself at home when your pain is not caused by anything harmful. · When belly pain is more severe or constant, see a doctor right away. · If you're sure your belly pain is a sign of labor, call your doctor. · When belly pain is brief, it's usually a normal part of pregnancy.  It might be related to changes in the growing uterus. Or it could be the stretching of ligaments called round ligaments. These ligaments help support the uterus. Round ligament pain can be on either side of your belly. It can also be felt in your hips or groin. Follow-up care is a key part of your treatment and safety. Be sure to make and go to all appointments, and call your doctor if you are having problems. It's also a good idea to know your test results and keep a list of the medicines you take. How can you tell if belly pain is a sign of labor? When belly pain is caused by labor, it can feel like mild or menstrual-like cramps in your lower belly. These cramps are probably contractions. They can happen in your second or third trimester. You may also have:  · A steady, dull ache in your lower back, pelvis, or thighs. · A feeling of pressure in your pelvis or lower belly. · Changes in your vaginal discharge or a sudden release of fluid from the vagina. If you think you are in labor, call your doctor. How can you care for yourself at home? When belly pain is mild and is not a symptom of labor:  · Rest until you feel better. · Take a warm bath. · Think about what you drink and eat:  ¨ Drink plenty of fluids. Choose water and other caffeine-free clear liquids until you feel better. ¨ Try eating small, frequent meals. If your stomach is upset, try bland, low-fat foods like plain rice, broiled chicken, toast, and yogurt. · Think about how you move if you are having brief pains from stretching of the round ligaments. ¨ Try gentle stretching. ¨ Move a little more slowly when turning in bed or getting up from a chair, so those ligaments don't stretch quickly. ¨ Lean forward a bit if you think you are going to cough or sneeze. When should you call for help? Call 911 anytime you think you may need emergency care. For example, call if:  · You have sudden, severe pain in your belly. · You have severe vaginal bleeding.   Call your doctor now or seek immediate medical care if:  · You have new or worse belly pain or cramping. · You have any vaginal bleeding. · You have a fever. · You have symptoms of preeclampsia, such as:  ¨ Sudden swelling of your face, hands, or feet. ¨ New vision problems (such as dimness or blurring). ¨ A severe headache. · You think that you may be in labor. This means that you've had at least 8 contractions within 1 hour or at least 4 contractions within 20 minutes, even after you change your position and drink fluids. · You have symptoms of a urinary tract infection. These may include:  ¨ Pain or burning when you urinate. ¨ A frequent need to urinate without being able to pass much urine. ¨ Pain in the flank, which is just below the rib cage and above the waist on either side of the back. ¨ Blood in your urine. Watch closely for changes in your health, and be sure to contact your doctor if you are worried about your or your baby's health. Where can you learn more? Go to comment.com.be  Enter B275 in the search box to learn more about \"Belly Pain in Pregnancy: Care Instructions. \"   © 3336-2415 [employer]. Care instructions adapted under license by [facility] [employer] (which disclaims liability or warranty for this information). This care instruction is for use with your licensed healthcare professional. If you have questions about a medical condition or this instruction, always ask your healthcare professional. [place] [address] any warranty or liability for your use of this information.   Content Version: 68.1.496365; Current as of: November 12, 2015

## 2018-10-26 LAB
BACTERIA SPEC CULT: NORMAL
C TRACH DNA SPEC QL NAA+PROBE: NEGATIVE
CC UR VC: NORMAL
N GONORRHOEA DNA SPEC QL NAA+PROBE: NEGATIVE
SAMPLE TYPE: NORMAL
SERVICE CMNT-IMP: NORMAL
SERVICE CMNT-IMP: NORMAL
SPECIMEN SOURCE: NORMAL

## 2018-12-23 ENCOUNTER — HOSPITAL ENCOUNTER (EMERGENCY)
Age: 23
Discharge: HOME | End: 2018-12-23
Attending: EMERGENCY MEDICINE
Payer: MEDICAID

## 2018-12-23 VITALS
TEMPERATURE: 97.7 F | OXYGEN SATURATION: 100 % | DIASTOLIC BLOOD PRESSURE: 72 MMHG | WEIGHT: 124 LBS | HEIGHT: 68 IN | RESPIRATION RATE: 16 BRPM | SYSTOLIC BLOOD PRESSURE: 124 MMHG | BODY MASS INDEX: 18.79 KG/M2 | HEART RATE: 83 BPM

## 2018-12-23 DIAGNOSIS — N30.00 ACUTE CYSTITIS WITHOUT HEMATURIA: Primary | ICD-10-CM

## 2018-12-23 LAB
APPEARANCE UR: ABNORMAL
BACTERIA URNS QL MICRO: ABNORMAL /HPF
BILIRUB UR QL: NEGATIVE
COLOR UR: ABNORMAL
EPITH CASTS URNS QL MICRO: ABNORMAL /LPF
GLUCOSE UR STRIP.AUTO-MCNC: 100 MG/DL
HGB UR QL STRIP: NEGATIVE
KETONES UR QL STRIP.AUTO: NEGATIVE MG/DL
LEUKOCYTE ESTERASE UR QL STRIP.AUTO: ABNORMAL
MUCOUS THREADS URNS QL MICRO: ABNORMAL /LPF
NITRITE UR QL STRIP.AUTO: NEGATIVE
PH UR STRIP: 6 [PH] (ref 5–8)
PROT UR STRIP-MCNC: NEGATIVE MG/DL
RBC #/AREA URNS HPF: ABNORMAL /HPF (ref 0–5)
SP GR UR REFRACTOMETRY: 1.02 (ref 1–1.03)
UA: UC IF INDICATED,UAUC: ABNORMAL
UROBILINOGEN UR QL STRIP.AUTO: 0.2 EU/DL (ref 0.2–1)
WBC URNS QL MICRO: ABNORMAL /HPF (ref 0–4)

## 2018-12-23 PROCEDURE — 99283 EMERGENCY DEPT VISIT LOW MDM: CPT

## 2018-12-23 PROCEDURE — 74011250637 HC RX REV CODE- 250/637: Performed by: PHYSICIAN ASSISTANT

## 2018-12-23 PROCEDURE — 87086 URINE CULTURE/COLONY COUNT: CPT

## 2018-12-23 PROCEDURE — 81001 URINALYSIS AUTO W/SCOPE: CPT

## 2018-12-23 RX ORDER — NITROFURANTOIN 25; 75 MG/1; MG/1
100 CAPSULE ORAL 2 TIMES DAILY
Qty: 10 CAP | Refills: 0 | Status: SHIPPED | OUTPATIENT
Start: 2018-12-23 | End: 2018-12-28

## 2018-12-23 RX ORDER — ONDANSETRON 4 MG/1
4 TABLET, ORALLY DISINTEGRATING ORAL
Qty: 10 TAB | Refills: 0 | Status: SHIPPED | OUTPATIENT
Start: 2018-12-23 | End: 2019-01-08

## 2018-12-23 RX ORDER — ONDANSETRON 4 MG/1
4 TABLET, ORALLY DISINTEGRATING ORAL
Status: COMPLETED | OUTPATIENT
Start: 2018-12-23 | End: 2018-12-23

## 2018-12-23 RX ADMIN — ONDANSETRON 4 MG: 4 TABLET, ORALLY DISINTEGRATING ORAL at 23:00

## 2018-12-23 NOTE — LETTER
[facility] EMERGENCY DEPT 
[address] [address] [phone] Work/School Note Date: 12/23/2018 To Whom It May concern: 
 
Pantera Encinas was seen and treated today in the emergency room by the following provider(s): 
Attending Provider: Hanna Marquez MD 
Physician Assistant: KAREN Rothman. Pantera Encinas may return to work on 12/25/2018. Sincerely, KAREN King

## 2018-12-23 NOTE — LETTER
[facility] EMERGENCY DEPT 
[address] [facility] [address] [address] 
[phone] Work/School Note Date: 12/23/2018 To Whom It May concern: 
 
Valentina Calderón was seen and treated today in the emergency room by the following provider(s): 
Attending Provider: Nathalie Gustafson MD 
Physician Assistant: KAREN Aguirre. Valentina Calderón may return to work on 12/24/2018. Sincerely, KAREN Canela

## 2018-12-24 NOTE — ED PROVIDER NOTES
EMERGENCY DEPARTMENT HISTORY AND PHYSICAL EXAM      Date: 2018  Patient Name: Patito Andre    History of Presenting Illness     Chief Complaint   Patient presents with    Abdominal Pain     R sided X today       History Provided By: Patient    HPI: Patito Andre, 20 y.o. female with PMHx significant for asthma,  presents ambulatory to the ED with cc of intermittent aching RLQ abd pain starting earlier today with two bouts NBNB emesis after eating. Pt reports she is currently hungry. Denies diarrhea, fever/chills, dysuria, hematuria, change in vaginal discharge. Last saw OB 1 week ago. Denies complications with pregnancy. Pt reports she has felt baby move earlier today. Denies vaginal bleeding. Has not taken anything for sx. Rates discomfort 8/10. Describes pain as a soreness. Denies aggravating or alleviating sx. There are no other complaints, changes, or physical findings at this time. PCP: Marily Ash MD    Current Outpatient Medications   Medication Sig Dispense Refill    nitrofurantoin, macrocrystal-monohydrate, (MACROBID) 100 mg capsule Take 1 Cap by mouth two (2) times a day for 5 days. 10 Cap 0    PNV with Ca #68-Iron-FA #1-DHA (PNV-OMEGA) 28-1-300 mg cap Take 1 Cap by mouth daily. 20 Cap 0    ALBUTEROL IN Take  by inhalation.  docusate sodium (COLACE) 100 mg capsule Take 1 Cap by mouth two (2) times a day for 90 days. 60 Cap 2    Cetirizine (ZYRTEC) 10 mg cap Take 10 mg by mouth daily. 7 Cap 0    diphenhydrAMINE (BENADRYL) 25 mg capsule Take 1 Cap by mouth every six (6) hours as needed. 20 Cap 0       Past History     Past Medical History:  Past Medical History:   Diagnosis Date    Asthma        Past Surgical History:  Past Surgical History:   Procedure Laterality Date    HX ORTHOPAEDIC  2012    left ACL repaired       Family History:  History reviewed. No pertinent family history.     Social History:  Social History     Tobacco Use    Smoking status: Never Smoker    Smokeless tobacco: Never Used   Substance Use Topics    Alcohol use: No    Drug use: No       Allergies: Allergies   Allergen Reactions    Latex Other (comments)     Skin irritation    Amoxicillin Itching    Pcn [Penicillins] Hives    Shellfish Derived Itching         Review of Systems   Review of Systems   Constitutional: Negative for chills and fever. Respiratory: Negative for shortness of breath. Cardiovascular: Negative for chest pain. Gastrointestinal: Positive for abdominal pain, nausea and vomiting. Negative for constipation and diarrhea. Genitourinary: Negative for dysuria, flank pain, hematuria, menstrual problem, vaginal bleeding, vaginal discharge and vaginal pain. Musculoskeletal: Negative for back pain and myalgias. Skin: Negative for color change, pallor, rash and wound. Neurological: Negative for dizziness, weakness and light-headedness. All other systems reviewed and are negative. Physical Exam   Physical Exam   Constitutional: She is oriented to person, place, and time. She appears well-developed and well-nourished. No distress. HENT:   Head: Normocephalic and atraumatic. Eyes: Conjunctivae are normal.   Cardiovascular: Normal rate, regular rhythm and normal heart sounds. Pulmonary/Chest: Effort normal and breath sounds normal. No respiratory distress. Abdominal: Soft. Bowel sounds are normal. She exhibits no distension. Abdominal pain not reproducible on exam  Gravid uterus   Musculoskeletal: Normal range of motion. Neurological: She is alert and oriented to person, place, and time. Skin: Skin is warm. No rash noted. Psychiatric: She has a normal mood and affect. Her behavior is normal.   Nursing note and vitals reviewed.       Diagnostic Study Results     Labs -     Recent Results (from the past 12 hour(s))   URINALYSIS W/ REFLEX CULTURE    Collection Time: 12/23/18 11:01 PM   Result Value Ref Range    Color YELLOW/STRAW      Appearance CLOUDY (A) CLEAR      Specific gravity 1.020 1.003 - 1.030      pH (UA) 6.0 5.0 - 8.0      Protein NEGATIVE  NEG mg/dL    Glucose 100 (A) NEG mg/dL    Ketone NEGATIVE  NEG mg/dL    Bilirubin NEGATIVE  NEG      Blood NEGATIVE  NEG      Urobilinogen 0.2 0.2 - 1.0 EU/dL    Nitrites NEGATIVE  NEG      Leukocyte Esterase SMALL (A) NEG      WBC 5-10 0 - 4 /hpf    RBC 0-5 0 - 5 /hpf    Epithelial cells FEW FEW /lpf    Bacteria 2+ (A) NEG /hpf    UA:UC IF INDICATED URINE CULTURE ORDERED (A) CNI      Mucus 2+ (A) NEG /lpf       Radiologic Studies -   No orders to display     CT Results  (Last 48 hours)    None        CXR Results  (Last 48 hours)    None            Medical Decision Making   I am the first provider for this patient. I reviewed the vital signs, available nursing notes, past medical history, past surgical history, family history and social history. Vital Signs-Reviewed the patient's vital signs. Patient Vitals for the past 12 hrs:   Temp Pulse Resp BP SpO2   12/23/18 2153 97.7 °F (36.5 °C) 83 16 124/72 100 %         Records Reviewed: Nursing Notes and Old Medical Records    Provider Notes (Medical Decision Making):   DDx: Abd pain in pregnancy, vomiting in pregnancy, gastroenteritis, dehydration, UTI, electrolyte abnormality    ED Course:   Initial assessment performed. The patients presenting problems have been discussed, and they are in agreement with the care plan formulated and outlined with them. I have encouraged them to ask questions as they arise throughout their visit. Fetal heart tones: 153 bpm    Pt states she thinks she can tolerate PO fluids and declined lab work. Zofran given with PO challenge afterwards. Pt reports improvement of nausea with zofran. Tolerated PO challenge without emesis. Disposition:  11:30 PM  Discussed lab results with pt along with dx and treatment plan. Discussed importance of OB follow up. All questions answered. Pt voiced they understood.  Return if sx worsen. PLAN:  1. Current Discharge Medication List      START taking these medications    Details   nitrofurantoin, macrocrystal-monohydrate, (MACROBID) 100 mg capsule Take 1 Cap by mouth two (2) times a day for 5 days. Qty: 10 Cap, Refills: 0           2. Follow-up Information     Follow up With Specialties Details Why Contact Info       Follow up OB         Return to ED if worse     Diagnosis     Clinical Impression:   1.  Acute cystitis without hematuria

## 2018-12-24 NOTE — ED NOTES
Patient presented to the ED today for complaints of right lower abdominal pain. Patient says symptoms started earlier today around 1pm. Patient says she vomited twice today Respirations are even and unlabored. Skin is dry,warm, and intact.

## 2018-12-24 NOTE — DISCHARGE INSTRUCTIONS
Urinary Tract Infection in Pregnancy: Care Instructions  Your Care Instructions    A urinary tract infection, or UTI, is an infection of the bladder and other urinary structures. Most UTIs occur in the bladder. They often cause pain or burning when you urinate. UTI is the most common bacterial infection in pregnancy. If untreated, a UTI could lead to problems such as a kidney infection or  labor. Most UTIs can be cured with antibiotics. Your doctor will prescribe an antibiotic that is safe during pregnancy. Be sure to finish your medicine so that the infection does not spread to your kidneys. Follow-up care is a key part of your treatment and safety. Be sure to make and go to all appointments, and call your doctor if you are having problems. It's also a good idea to know your test results and keep a list of the medicines you take. How can you care for yourself at home? · Take your antibiotics as directed. Do not stop taking them just because you feel better. You need to take the full course of antibiotics. · Drink extra water and other fluids for the next day or two. This will help wash out the bacteria causing the infection. If you have kidney, heart, or liver disease and have to limit fluids, talk with your doctor before you increase the amount of fluids you drink. · Do not drink alcohol. · Urinate often. Try to empty your bladder each time. Preventing UTIs  · Drink plenty of fluids, enough so that your urine is light yellow or clear like water. This helps you urinate often, which clears bacteria from your system. If you have kidney, heart, or liver disease and have to limit fluids, talk with your doctor before you increase the amount of fluids you drink. · Urinate when you first have the urge. · Urinate right after you have sex. This is the best way for women to avoid UTIs. · When going to the bathroom, wipe from front to back to keep bacteria from entering the vagina or urethra.   When should you call for help? Call your doctor now or seek immediate medical care if:    · You have symptoms of a worse urinary tract infection. These may include:  ? Pain or burning when you urinate. ? A frequent need to urinate without being able to pass much urine. ? Pain in the flank, which is just below the rib cage and above the waist on either side of the back. ? Blood in your urine. ? A fever.    Watch closely for changes in your health, and be sure to contact your doctor if:    · You do not get better as expected. Where can you learn more? Go to http://dee-chuy.info/. Enter M982 in the search box to learn more about \"Urinary Tract Infection in Pregnancy: Care Instructions. \"  Current as of: November 21, 2017  Content Version: 11.8  © 6022-1659 Healthwise, Incorporated. Care instructions adapted under license by [employer] (which disclaims liability or warranty for this information). If you have questions about a medical condition or this instruction, always ask your healthcare professional. [place] [address] any warranty or liability for your use of this information.

## 2018-12-24 NOTE — ED NOTES
Patient educated on discharge instructions and  Prescriptions by KAYLEEN Burleson. No acute distress noted. Emergency Department Nursing Plan of Care       The Nursing Plan of Care is developed from the Nursing assessment and Emergency Department Attending provider initial evaluation. The plan of care may be reviewed in the ED Provider note.     The Plan of Care was developed with the following considerations:   Patient / Family readiness to learn indicated by:verbalized understanding  Persons(s) to be included in education: patient  Barriers to Learning/Limitations:No    Signed     Emmy Vale RN    12/23/2018   11:43 PM

## 2018-12-25 LAB
BACTERIA SPEC CULT: NORMAL
CC UR VC: NORMAL
SERVICE CMNT-IMP: NORMAL

## 2019-01-07 PROCEDURE — 96374 THER/PROPH/DIAG INJ IV PUSH: CPT

## 2019-01-07 PROCEDURE — 96361 HYDRATE IV INFUSION ADD-ON: CPT

## 2019-01-07 PROCEDURE — 99284 EMERGENCY DEPT VISIT MOD MDM: CPT

## 2019-01-08 ENCOUNTER — HOSPITAL ENCOUNTER (EMERGENCY)
Age: 24
Discharge: HOME | End: 2019-01-08
Attending: EMERGENCY MEDICINE
Payer: COMMERCIAL

## 2019-01-08 VITALS
TEMPERATURE: 97.9 F | WEIGHT: 130.07 LBS | DIASTOLIC BLOOD PRESSURE: 80 MMHG | OXYGEN SATURATION: 100 % | HEIGHT: 68 IN | HEART RATE: 79 BPM | RESPIRATION RATE: 18 BRPM | SYSTOLIC BLOOD PRESSURE: 123 MMHG | BODY MASS INDEX: 19.71 KG/M2

## 2019-01-08 DIAGNOSIS — O23.42 URINARY TRACT INFECTION IN MOTHER DURING SECOND TRIMESTER OF PREGNANCY: Primary | ICD-10-CM

## 2019-01-08 DIAGNOSIS — N76.0 BV (BACTERIAL VAGINOSIS): ICD-10-CM

## 2019-01-08 DIAGNOSIS — B96.89 BV (BACTERIAL VAGINOSIS): ICD-10-CM

## 2019-01-08 LAB
ALBUMIN SERPL-MCNC: 3.4 G/DL (ref 3.5–5)
ALBUMIN/GLOB SERPL: 0.8 {RATIO} (ref 1.1–2.2)
ALP SERPL-CCNC: 79 U/L (ref 45–117)
ALT SERPL-CCNC: 19 U/L (ref 12–78)
AMORPH CRY URNS QL MICRO: ABNORMAL
ANION GAP SERPL CALC-SCNC: 8 MMOL/L (ref 5–15)
APPEARANCE UR: ABNORMAL
AST SERPL-CCNC: 22 U/L (ref 15–37)
BACTERIA URNS QL MICRO: ABNORMAL /HPF
BASOPHILS # BLD: 0 K/UL (ref 0–0.1)
BASOPHILS NFR BLD: 0 % (ref 0–1)
BILIRUB SERPL-MCNC: 0.2 MG/DL (ref 0.2–1)
BILIRUB UR QL: NEGATIVE
BUN SERPL-MCNC: 7 MG/DL (ref 6–20)
BUN/CREAT SERPL: 10 (ref 12–20)
CALCIUM SERPL-MCNC: 9.1 MG/DL (ref 8.5–10.1)
CHLORIDE SERPL-SCNC: 106 MMOL/L (ref 97–108)
CLUE CELLS VAG QL WET PREP: NORMAL
CO2 SERPL-SCNC: 24 MMOL/L (ref 21–32)
COLOR UR: ABNORMAL
CREAT SERPL-MCNC: 0.73 MG/DL (ref 0.55–1.02)
DIFFERENTIAL METHOD BLD: ABNORMAL
EOSINOPHIL # BLD: 0.3 K/UL (ref 0–0.4)
EOSINOPHIL NFR BLD: 2 % (ref 0–7)
EPITH CASTS URNS QL MICRO: ABNORMAL /LPF
ERYTHROCYTE [DISTWIDTH] IN BLOOD BY AUTOMATED COUNT: 14.3 % (ref 11.5–14.5)
GLOBULIN SER CALC-MCNC: 4.2 G/DL (ref 2–4)
GLUCOSE SERPL-MCNC: 87 MG/DL (ref 65–100)
GLUCOSE UR STRIP.AUTO-MCNC: NEGATIVE MG/DL
HCT VFR BLD AUTO: 36.2 % (ref 35–47)
HGB BLD-MCNC: 11.9 G/DL (ref 11.5–16)
HGB UR QL STRIP: NEGATIVE
IMM GRANULOCYTES # BLD: 0.1 K/UL (ref 0–0.04)
IMM GRANULOCYTES NFR BLD AUTO: 1 % (ref 0–0.5)
KETONES UR QL STRIP.AUTO: NEGATIVE MG/DL
KOH PREP SPEC: NORMAL
LEUKOCYTE ESTERASE UR QL STRIP.AUTO: ABNORMAL
LYMPHOCYTES # BLD: 2.5 K/UL (ref 0.8–3.5)
LYMPHOCYTES NFR BLD: 16 % (ref 12–49)
MCH RBC QN AUTO: 29.8 PG (ref 26–34)
MCHC RBC AUTO-ENTMCNC: 32.9 G/DL (ref 30–36.5)
MCV RBC AUTO: 90.5 FL (ref 80–99)
MONOCYTES # BLD: 1.1 K/UL (ref 0–1)
MONOCYTES NFR BLD: 7 % (ref 5–13)
NEUTS SEG # BLD: 12 K/UL (ref 1.8–8)
NEUTS SEG NFR BLD: 75 % (ref 32–75)
NITRITE UR QL STRIP.AUTO: NEGATIVE
NRBC # BLD: 0 K/UL (ref 0–0.01)
NRBC BLD-RTO: 0 PER 100 WBC
PH UR STRIP: 6 [PH] (ref 5–8)
PLATELET # BLD AUTO: 268 K/UL (ref 150–400)
PMV BLD AUTO: 10.6 FL (ref 8.9–12.9)
POTASSIUM SERPL-SCNC: 3.6 MMOL/L (ref 3.5–5.1)
PROT SERPL-MCNC: 7.6 G/DL (ref 6.4–8.2)
PROT UR STRIP-MCNC: NEGATIVE MG/DL
RBC # BLD AUTO: 4 M/UL (ref 3.8–5.2)
RBC #/AREA URNS HPF: ABNORMAL /HPF (ref 0–5)
SERVICE CMNT-IMP: NORMAL
SODIUM SERPL-SCNC: 138 MMOL/L (ref 136–145)
SP GR UR REFRACTOMETRY: 1.03 (ref 1–1.03)
T VAGINALIS VAG QL WET PREP: NORMAL
UA: UC IF INDICATED,UAUC: ABNORMAL
UROBILINOGEN UR QL STRIP.AUTO: 0.2 EU/DL (ref 0.2–1)
WBC # BLD AUTO: 16 K/UL (ref 3.6–11)
WBC URNS QL MICRO: ABNORMAL /HPF (ref 0–4)

## 2019-01-08 PROCEDURE — 80053 COMPREHEN METABOLIC PANEL: CPT

## 2019-01-08 PROCEDURE — 74011250636 HC RX REV CODE- 250/636: Performed by: EMERGENCY MEDICINE

## 2019-01-08 PROCEDURE — 85025 COMPLETE CBC W/AUTO DIFF WBC: CPT

## 2019-01-08 PROCEDURE — 81001 URINALYSIS AUTO W/SCOPE: CPT

## 2019-01-08 PROCEDURE — 87210 SMEAR WET MOUNT SALINE/INK: CPT

## 2019-01-08 PROCEDURE — 36415 COLL VENOUS BLD VENIPUNCTURE: CPT

## 2019-01-08 PROCEDURE — 87086 URINE CULTURE/COLONY COUNT: CPT

## 2019-01-08 PROCEDURE — 87491 CHLMYD TRACH DNA AMP PROBE: CPT

## 2019-01-08 RX ORDER — SODIUM CHLORIDE 0.9 % (FLUSH) 0.9 %
5-40 SYRINGE (ML) INJECTION EVERY 8 HOURS
Status: DISCONTINUED | OUTPATIENT
Start: 2019-01-08 | End: 2019-01-08 | Stop reason: HOSPADM

## 2019-01-08 RX ORDER — CEPHALEXIN 500 MG/1
500 CAPSULE ORAL 4 TIMES DAILY
Qty: 28 CAP | Refills: 0 | Status: SHIPPED | OUTPATIENT
Start: 2019-01-08 | End: 2019-01-15

## 2019-01-08 RX ORDER — ONDANSETRON 4 MG/1
4 TABLET, ORALLY DISINTEGRATING ORAL
Qty: 10 TAB | Refills: 0 | Status: SHIPPED | OUTPATIENT
Start: 2019-01-08 | End: 2019-06-21

## 2019-01-08 RX ORDER — METRONIDAZOLE 7.5 MG/G
1 GEL VAGINAL 2 TIMES DAILY
Qty: 1 TUBE | Refills: 0 | Status: SHIPPED | OUTPATIENT
Start: 2019-01-08 | End: 2019-01-13

## 2019-01-08 RX ORDER — FAMOTIDINE 10 MG/ML
20 INJECTION INTRAVENOUS
Status: COMPLETED | OUTPATIENT
Start: 2019-01-08 | End: 2019-01-08

## 2019-01-08 RX ORDER — SODIUM CHLORIDE 0.9 % (FLUSH) 0.9 %
5-40 SYRINGE (ML) INJECTION AS NEEDED
Status: DISCONTINUED | OUTPATIENT
Start: 2019-01-08 | End: 2019-01-08 | Stop reason: HOSPADM

## 2019-01-08 RX ADMIN — Medication 10 ML: at 01:43

## 2019-01-08 RX ADMIN — FAMOTIDINE 20 MG: 10 INJECTION, SOLUTION INTRAVENOUS at 01:43

## 2019-01-08 RX ADMIN — SODIUM CHLORIDE 1000 ML: 900 INJECTION, SOLUTION INTRAVENOUS at 01:43

## 2019-01-08 NOTE — ED NOTES
Pt discharge with discharge instructions from [place] Krishnan MD. Pt given opportunities to ask questions. Pt expressed no more needs at the time of discharge. Pt discharged via ambulatory.

## 2019-01-08 NOTE — ED NOTES
Pt ambulatory to ED. Pt c/o of pelvic pain and abdominal pain that began last night. Pt reports she has been nauseous. Pt reports she was seen at [facility] last week and was given antibiotic for a UTI which has caused some itching in her vaginal area. Pt denies vomiting, diarrhea, urinary symptoms. Pt changed into hospital gown and placed on monitor x2.

## 2019-01-08 NOTE — ED PROVIDER NOTES
EMERGENCY DEPARTMENT HISTORY AND PHYSICAL EXAM 
     
 
Date: 2019 Patient Name: Melonie Dominique History of Presenting Illness Chief Complaint Patient presents with  Pelvic Pain  
  ambulatory to triage, states that she is 5 months pregnant and is currently being treated for a UTI. Started last night with abdominal pain and nausea. States that baby is not moving as usual, has not had any vaginal bleedign but has had some discharge  Nausea  Diarrhea  Pregnancy Problem History Provided By: Patient HPI: Melonie Dominique is a 20 y.o. female, ~18 weeks gravid  (due 18), who presents ambulatory to the ED c/o gradually worsening nausea that began yesterday evening. She reports additional constipation and diffuse lower abd pain that intermittently radiates down her BLE. Pt states she was evaluated at [facility] on 18 and treated with Macrobid for a UTI. She notes only taking 3 days of the new rx, but reports discontinuing the medication secondary to vaginal itching. Pt states she is currently followed by a midwife and has a follow up appointment for fetal testing on 1/15/18. She notes compliance with her gummy prenatal vitamins. Pt otherwise specifically denies any recent fever, chills, vaginal bleeding, vomiting, diarrhea, CP, SOB, lightheadedness, dizziness, numbness, weakness, tingling, BLE swelling, HA, heart palpitations, urinary sxs, changes in PO intake, melena, hematochezia, cough, or congestion. PCP: Carol Barrios MD  
 
PMHx: Significant for asthma PSHx: Significant for orthopedic surgery Social Hx: -tobacco, -EtOH, -Illicit Drugs There are no other complaints, changes, or physical findings at this time. Current Facility-Administered Medications Medication Dose Route Frequency Provider Last Rate Last Dose  sodium chloride (NS) flush 5-40 mL  5-40 mL IntraVENous Q8H Lucía Art MD   10 mL at 19 [id number]  sodium chloride (NS) flush 5-40 mL  5-40 mL IntraVENous PRN Lucía Art MD      
 
Current Outpatient Medications Medication Sig Dispense Refill  ondansetron (ZOFRAN ODT) 4 mg disintegrating tablet Take 1 Tab by mouth every eight (8) hours as needed for Nausea. 10 Tab 0  PNV with Ca #68-Iron-FA #1-DHA (PNV-OMEGA) 28-1-300 mg cap Take 1 Cap by mouth daily. 20 Cap 0  
 metroNIDAZOLE (METROGEL VAGINAL) 0.75 % vaginal gel Insert 1 Applicator into vagina two (2) times a day for 5 days. 1 Tube 0  
 cephALEXin (KEFLEX) 500 mg capsule Take 1 Cap by mouth four (4) times daily for 7 days. 28 Cap 0  
 docusate sodium (COLACE) 100 mg capsule Take 1 Cap by mouth two (2) times a day for 90 days. 60 Cap 2  
 Cetirizine (ZYRTEC) 10 mg cap Take 10 mg by mouth daily. 7 Cap 0  
 diphenhydrAMINE (BENADRYL) 25 mg capsule Take 1 Cap by mouth every six (6) hours as needed. 20 Cap 0  
 ALBUTEROL IN Take  by inhalation. Past History Past Medical History: 
Past Medical History:  
Diagnosis Date  Asthma Past Surgical History: 
Past Surgical History:  
Procedure Laterality Date  HX ORTHOPAEDIC  2012  
 left ACL repaired Family History: 
History reviewed. No pertinent family history. Social History: 
Social History Tobacco Use  Smoking status: Never Smoker  Smokeless tobacco: Never Used Substance Use Topics  Alcohol use: No  
 Drug use: No  
 
 
Allergies: Allergies Allergen Reactions  Latex Other (comments) Skin irritation  Amoxicillin Itching  Pcn [Penicillins] Hives  Shellfish Derived Itching Review of Systems Review of Systems Constitutional: Negative for chills and fever. HENT: Negative for congestion, ear pain, rhinorrhea and sore throat. Respiratory: Negative for cough and shortness of breath. Cardiovascular: Negative for chest pain, palpitations and leg swelling. Gastrointestinal: Positive for abdominal pain, constipation and nausea. Negative for diarrhea and vomiting. No melena No hematochezia Endocrine: Negative for polyuria. Genitourinary: Negative for dysuria, frequency and hematuria. Musculoskeletal: Positive for myalgias (BLE). Neurological: Negative for dizziness, weakness, light-headedness, numbness and headaches. No tingling All other systems reviewed and are negative. Physical Exam  
Physical Exam  
Nursing note and vitals reviewed. General appearance - well nourished, thin appearing, and in no distress Eyes - pupils equal and reactive, extraocular eye movements intact ENT - mucous membranes moist, pharynx normal without lesions Neck - supple, no significant adenopathy; non-tender to palpation Chest - clear to auscultation, no wheezes, rales or rhonchi; non-tender to palpation Heart - normal rate and regular rhythm, S1 and S2 normal, no murmurs noted Abdomen - soft, tenderness to palpation across her lower abdomen, gravid uterus with fundus palpable at umbilicus, nondistended, no masses or organomegaly Musculoskeletal - no joint tenderness, deformity or swelling; normal ROM Extremities - peripheral pulses normal, no pedal edema Skin - normal coloration and turgor, no rashes Neurological - alert, oriented x3, normal speech, no focal findings or movement disorder noted Written by NELSON Marr, as dictated by Liset De La Torre MD 
 
 
Diagnostic Study Results Labs - Recent Results (from the past 12 hour(s)) URINALYSIS W/ REFLEX CULTURE Collection Time: 01/08/19 12:10 AM  
Result Value Ref Range Color YELLOW/STRAW Appearance CLOUDY (A) CLEAR Specific gravity 1.028 1.003 - 1.030    
 pH (UA) 6.0 5.0 - 8.0 Protein NEGATIVE  NEG mg/dL Glucose NEGATIVE  NEG mg/dL Ketone NEGATIVE  NEG mg/dL Bilirubin NEGATIVE  NEG  Blood NEGATIVE  NEG    
 Urobilinogen 0.2 0.2 - 1.0 EU/dL Nitrites NEGATIVE  NEG Leukocyte Esterase LARGE (A) NEG    
 WBC 20-50 0 - 4 /hpf  
 RBC 0-5 0 - 5 /hpf Epithelial cells MANY (A) FEW /lpf Bacteria 3+ (A) NEG /hpf  
 UA:UC IF INDICATED URINE CULTURE ORDERED (A) CNI Amorphous Crystals 1+ (A) NEG  
CBC WITH AUTOMATED DIFF Collection Time: 01/08/19  1:38 AM  
Result Value Ref Range WBC 16.0 (H) 3.6 - 11.0 K/uL  
 RBC 4.00 3.80 - 5.20 M/uL  
 HGB 11.9 11.5 - 16.0 g/dL HCT 36.2 35.0 - 47.0 % MCV 90.5 80.0 - 99.0 FL  
 MCH 29.8 26.0 - 34.0 PG  
 MCHC 32.9 30.0 - 36.5 g/dL  
 RDW 14.3 11.5 - 14.5 % PLATELET 327 663 - 637 K/uL MPV 10.6 8.9 - 12.9 FL  
 NRBC 0.0 0  WBC ABSOLUTE NRBC 0.00 0.00 - 0.01 K/uL NEUTROPHILS 75 32 - 75 % LYMPHOCYTES 16 12 - 49 % MONOCYTES 7 5 - 13 % EOSINOPHILS 2 0 - 7 % BASOPHILS 0 0 - 1 % IMMATURE GRANULOCYTES 1 (H) 0.0 - 0.5 % ABS. NEUTROPHILS 12.0 (H) 1.8 - 8.0 K/UL  
 ABS. LYMPHOCYTES 2.5 0.8 - 3.5 K/UL  
 ABS. MONOCYTES 1.1 (H) 0.0 - 1.0 K/UL  
 ABS. EOSINOPHILS 0.3 0.0 - 0.4 K/UL  
 ABS. BASOPHILS 0.0 0.0 - 0.1 K/UL  
 ABS. IMM. GRANS. 0.1 (H) 0.00 - 0.04 K/UL  
 DF AUTOMATED METABOLIC PANEL, COMPREHENSIVE Collection Time: 01/08/19  1:38 AM  
Result Value Ref Range Sodium 138 136 - 145 mmol/L Potassium 3.6 3.5 - 5.1 mmol/L Chloride 106 97 - 108 mmol/L  
 CO2 24 21 - 32 mmol/L Anion gap 8 5 - 15 mmol/L Glucose 87 65 - 100 mg/dL BUN 7 6 - 20 MG/DL Creatinine 0.73 0.55 - 1.02 MG/DL  
 BUN/Creatinine ratio 10 (L) 12 - 20 GFR est AA >60 >60 ml/min/1.73m2 GFR est non-AA >60 >60 ml/min/1.73m2 Calcium 9.1 8.5 - 10.1 MG/DL Bilirubin, total 0.2 0.2 - 1.0 MG/DL  
 ALT (SGPT) 19 12 - 78 U/L  
 AST (SGOT) 22 15 - 37 U/L Alk. phosphatase 79 45 - 117 U/L Protein, total 7.6 6.4 - 8.2 g/dL Albumin 3.4 (L) 3.5 - 5.0 g/dL Globulin 4.2 (H) 2.0 - 4.0 g/dL A-G Ratio 0.8 (L) 1.1 - 2.2 KOH, OTHER SOURCES  
 Collection Time: 01/08/19  2:29 AM  
Result Value Ref Range Special Requests: NO SPECIAL REQUESTS    
 KOH NO YEAST SEEN    
WET PREP Collection Time: 01/08/19  2:29 AM  
Result Value Ref Range Clue cells NO TRICHOMONAS SEEN Wet prep CLUE CELLS PRESENT Radiologic Studies - No orders to display CT Results  (Last 48 hours) None CXR Results  (Last 48 hours) None Medical Decision Making I am the first provider for this patient. I reviewed the vital signs, available nursing notes, past medical history, past surgical history, family history and social history. Vital Signs-Reviewed the patient's vital signs. Patient Vitals for the past 12 hrs: 
 Temp Pulse Resp BP SpO2  
01/07/19 2321 97.9 °F (36.6 °C) 79 18 121/77 100 % Records Reviewed: Nursing Notes and Old Medical Records Provider Notes (Medical Decision Making): DDx: UTI, pregnancy induced nausea and vomiting, vaginal infection ED Course:  
Initial assessment performed. The patients presenting problems have been discussed, and they are in agreement with the care plan formulated and outlined with them. I have encouraged them to ask questions as they arise throughout their visit. Progress note: 
3:25 AM 
Pt noted to be feeling better, ready for discharge. Updated pt and/or family on all final lab findings. Will follow up as instructed. All questions have been answered, pt voiced understanding and agreement with plan. Abx were prescribed, pt advised that diarrhea and rash are possible side effects of the medications. Specific return precautions provided as well as instructions to return to the ED should sx worsen at any time. Vital signs stable for discharge. Written by Sae Pantoja ED Scribe, as dictated by Lucía Art MD 
 
Critical Care Time:  
 
none Diagnosis Clinical Impression: 1. Urinary tract infection in mother during second trimester of pregnancy 2. BV (bacterial vaginosis) PLAN: 
1. Current Discharge Medication List  
  
START taking these medications Details  
metroNIDAZOLE (METROGEL VAGINAL) 0.75 % vaginal gel Insert 1 Applicator into vagina two (2) times a day for 5 days. Qty: 1 Tube, Refills: 0  
  
cephALEXin (KEFLEX) 500 mg capsule Take 1 Cap by mouth four (4) times daily for 7 days. Qty: 28 Cap, Refills: 0 CONTINUE these medications which have CHANGED Details  
ondansetron (ZOFRAN ODT) 4 mg disintegrating tablet Take 1 Tab by mouth every eight (8) hours as needed for Nausea. Qty: 10 Tab, Refills: 0  
  
PNV with Ca #68-Iron-FA #1-DHA (PNV-OMEGA) 28-1-300 mg cap Take 1 Cap by mouth daily. Qty: 20 Cap, Refills: 0  
  
  
 
2. Follow-up Information Follow up With Specialties Details Why Contact [address] [place]  Call  [address]   [phone] [facility] [place] EMERGENCY DEPT Emergency Medicine  If symptoms worsen [address] [facility] 
[phone] Return to ED if worse Disposition: 
 
DISCHARGE NOTE: 
3:26 AM 
The patient's results have been reviewed with family and/or caregiver. They verbally convey their understanding and agreement of the patient's signs, symptoms, diagnosis, treatment, and prognosis. They additionally agree to follow up as recommended in the discharge instructions or to return to the Emergency Room should the patient's condition change prior to their follow-up appointment. The family and/or caregiver verbally agrees with the care-plan and all of their questions have been answered. The discharge instructions have also been provided to the them along with educational information regarding the patient's diagnosis and a list of reasons why the patient would want to return to the ER prior to their follow-up appointment should their condition change.  
Written by NELSON Woodward, as dictated by Lady Pola MD.  
 Attestations: This note is prepared by Sarahy Hayes, acting as Scribe for MD Lucía Delgado MD : The scribe's documentation has been prepared under my direction and personally reviewed by me in its entirety. I confirm that the note above accurately reflects all work, treatment, procedures, and medical decision making performed by me. This note will not be viewable in [address]

## 2019-05-05 ENCOUNTER — HOSPITAL ENCOUNTER (EMERGENCY)
Age: 24
Discharge: HOME | End: 2019-05-05
Attending: EMERGENCY MEDICINE
Payer: MEDICAID

## 2019-05-05 VITALS
HEART RATE: 85 BPM | DIASTOLIC BLOOD PRESSURE: 75 MMHG | OXYGEN SATURATION: 99 % | WEIGHT: 145 LBS | BODY MASS INDEX: 21.98 KG/M2 | HEIGHT: 68 IN | TEMPERATURE: 97.3 F | RESPIRATION RATE: 14 BRPM | SYSTOLIC BLOOD PRESSURE: 114 MMHG

## 2019-05-05 DIAGNOSIS — Z88.9 H/O SEASONAL ALLERGIES: ICD-10-CM

## 2019-05-05 DIAGNOSIS — H93.8X1 EAR FULLNESS, RIGHT: Primary | ICD-10-CM

## 2019-05-05 PROCEDURE — 99282 EMERGENCY DEPT VISIT SF MDM: CPT

## 2019-05-05 RX ORDER — LORATADINE 10 MG/1
10 TABLET ORAL DAILY
Qty: 10 TAB | Refills: 0 | Status: SHIPPED | OUTPATIENT
Start: 2019-05-05 | End: 2020-05-03

## 2019-05-05 RX ORDER — FLUTICASONE PROPIONATE 50 MCG
2 SPRAY, SUSPENSION (ML) NASAL DAILY
Qty: 1 BOTTLE | Refills: 0 | Status: SHIPPED | OUTPATIENT
Start: 2019-05-05 | End: 2019-12-17

## 2019-05-05 RX ORDER — LORATADINE 10 MG/1
10 TABLET ORAL DAILY
Qty: 10 TAB | Refills: 0 | Status: SHIPPED | OUTPATIENT
Start: 2019-05-05 | End: 2019-05-05

## 2019-05-06 NOTE — ED NOTES
KAREN Mesa at bedside reviewing patient's discharge instructions and reviewing medications. Patient ambulatory home with self. Patient in no apparent distress.

## 2019-05-06 NOTE — DISCHARGE INSTRUCTIONS
Patient Education        Allergies: Care Instructions  Your Care Instructions    Allergies occur when your body's defense system (immune system) overreacts to certain substances. The immune system treats a harmless substance as if it were a harmful germ or virus. Many things can cause this overreaction, including pollens, medicine, food, dust, animal dander, and mold. Allergies can be mild or severe. Mild allergies can be managed with home treatment. But medicine may be needed to prevent problems. Managing your allergies is an important part of staying healthy. Your doctor may suggest that you have allergy testing to help find out what is causing your allergies. When you know what things trigger your symptoms, you can avoid them. This can prevent allergy symptoms and other health problems. For severe allergies that cause reactions that affect your whole body (anaphylactic reactions), your doctor may prescribe a shot of epinephrine to carry with you in case you have a severe reaction. Learn how to give yourself the shot and keep it with you at all times. Make sure it is not . Follow-up care is a key part of your treatment and safety. Be sure to make and go to all appointments, and call your doctor if you are having problems. It's also a good idea to know your test results and keep a list of the medicines you take. How can you care for yourself at home? · If you have been told by your doctor that dust or dust mites are causing your allergy, decrease the dust around your bed:  ? Wash sheets, pillowcases, and other bedding in hot water every week. ? Use dust-proof covers for pillows, duvets, and mattresses. Avoid plastic covers because they tear easily and do not \"breathe. \" Wash as instructed on the label. ? Do not use any blankets and pillows that you do not need. ? Use blankets that you can wash in your washing machine. ?  Consider removing drapes and carpets, which attract and hold dust, from your bedroom. · If you are allergic to house dust and mites, do not use home humidifiers. Your doctor can suggest ways you can control dust and mites. · Look for signs of cockroaches. Cockroaches cause allergic reactions. Use cockroach baits to get rid of them. Then, clean your home well. Cockroaches like areas where grocery bags, newspapers, empty bottles, or cardboard boxes are stored. Do not keep these inside your home, and keep trash and food containers sealed. Seal off any spots where cockroaches might enter your home. · If you are allergic to mold, get rid of furniture, rugs, and drapes that smell musty. Check for mold in the bathroom. · If you are allergic to outdoor pollen or mold spores, use air-conditioning. Change or clean all filters every month. Keep windows closed. · If you are allergic to pollen, stay inside when pollen counts are high. Use a vacuum  with a HEPA filter or a double-thickness filter at least two times each week. · Stay inside when air pollution is bad. Avoid paint fumes, perfumes, and other strong odors. · Avoid conditions that make your allergies worse. Stay away from smoke. Do not smoke or let anyone else smoke in your house. Do not use fireplaces or wood-burning stoves. · If you are allergic to your pets, change the air filter in your furnace every month. Use high-efficiency filters. · If you are allergic to pet dander, keep pets outside or out of your bedroom. Old carpet and cloth furniture can hold a lot of animal dander. You may need to replace them. When should you call for help? Give an epinephrine shot if:    · You think you are having a severe allergic reaction.     · You have symptoms in more than one body area, such as mild nausea and an itchy mouth.    After giving an epinephrine shot call 911, even if you feel better.   Call 911 if:    · You have symptoms of a severe allergic reaction.  These may include:  ? Sudden raised, red areas (hives) all over your body.  ? Swelling of the throat, mouth, lips, or tongue. ? Trouble breathing. ? Passing out (losing consciousness). Or you may feel very lightheaded or suddenly feel weak, confused, or restless.     · You have been given an epinephrine shot, even if you feel better.    Call your doctor now or seek immediate medical care if:    · You have symptoms of an allergic reaction, such as:  ? A rash or hives (raised, red areas on the skin). ? Itching. ? Swelling. ? Belly pain, nausea, or vomiting.    Watch closely for changes in your health, and be sure to contact your doctor if:    · You do not get better as expected. Where can you learn more? Go to http://dee-chuy.info/. Enter W190 in the search box to learn more about \"Allergies: Care Instructions. \"  Current as of: June 27, 2018  Content Version: 11.9  © 8333-7654 [employer]. Care instructions adapted under license by [employer] (which disclaims liability or warranty for this information). If you have questions about a medical condition or this instruction, always ask your healthcare professional. [place] [address] any warranty or liability for your use of this information.

## 2019-05-06 NOTE — ED PROVIDER NOTES
EMERGENCY DEPARTMENT HISTORY AND PHYSICAL EXAM 
 
Date: 2019 Patient Name: Chavez Ruff History of Presenting Illness Chief Complaint Patient presents with  Ear Fullness History Provided By: Patient Chief Complaint: ear fullness and  pain Duration: 2 Days Timing:  Acute Location: right ear Quality: Pressure Severity: 7 out of 10 Modifying Factors: none Associated Symptoms: denies any other associated signs or symptoms HPI: Chavez Ruff is a 30 y.o. female with a PMH of  one 36 weeks pregnant EDC isela 3 who presents with right ear fullness onset 2 days ago. Patient states she has had similar symptoms prior to her pregnancy and use Flonase with relief. Patient denies nasal congestion cough fever shortness of breath wheezing. She denies sinus pain or headache. She specifically denies abdominal cramping back pain abnormal vaginal discharge or vaginal bleeding. There are no other complaints at this time. PCP: Tamela Saavedra MD 
 
Current Outpatient Medications Medication Sig Dispense Refill  fluticasone propionate (FLONASE) 50 mcg/actuation nasal spray 2 Sprays by Both Nostrils route daily. 1 Bottle 0  
 loratadine (CLARITIN) 10 mg tablet Take 1 Tab by mouth daily. 10 Tab 0  PNV with Ca #68-Iron-FA #1-DHA (PNV-OMEGA) 28-1-300 mg cap Take 1 Cap by mouth daily. 20 Cap 0  
 ondansetron (ZOFRAN ODT) 4 mg disintegrating tablet Take 1 Tab by mouth every eight (8) hours as needed for Nausea. 10 Tab 0  ALBUTEROL IN Take  by inhalation. Past History Past Medical History: 
Past Medical History:  
Diagnosis Date  Asthma Past Surgical History: 
Past Surgical History:  
Procedure Laterality Date  HX ORTHOPAEDIC  2012  
 left ACL repaired Family History: 
History reviewed. No pertinent family history. Social History: 
Social History Tobacco Use  Smoking status: Never Smoker  Smokeless tobacco: Never Used Substance Use Topics  Alcohol use: No  
 Drug use: No  
 
 
Allergies: Allergies Allergen Reactions  Latex Other (comments) Skin irritation  Amoxicillin Itching  Pcn [Penicillins] Hives  Shellfish Derived Itching Review of Systems Review of Systems Constitutional: Negative for fatigue and fever. HENT: Positive for ear pain. Ear fullness Respiratory: Negative for shortness of breath and wheezing. Cardiovascular: Negative for chest pain. Gastrointestinal: Negative for abdominal pain. Genitourinary: Negative for flank pain, vaginal bleeding, vaginal discharge and vaginal pain. Musculoskeletal: Negative for arthralgias, myalgias, neck pain and neck stiffness. Skin: Negative for pallor and rash. Neurological: Negative for dizziness, tremors, weakness and headaches. All other systems reviewed and are negative. Physical Exam  
 
Vitals:  
 05/05/19 2009 05/05/19 2046 BP: 114/75 Pulse: 85 Resp: 14 Temp: 97.3 °F (36.3 °C) SpO2: 99% 99% Weight: 65.8 kg (145 lb) Height: 5' 8\" (1.727 m) Physical Exam  
Constitutional: She is oriented to person, place, and time. She appears well-developed and well-nourished. No distress. HENT:  
Head: Normocephalic and atraumatic. Right Ear: External ear normal. Tympanic membrane is not injected. A middle ear effusion is present. Left Ear: External ear normal. Tympanic membrane is not injected. Nose: Nose normal.  
Mouth/Throat: Oropharynx is clear and moist.  
Eyes: Conjunctivae are normal.  
Neck: Normal range of motion. Neck supple. Cardiovascular: Normal rate and regular rhythm. Pulmonary/Chest: Effort normal and breath sounds normal. No respiratory distress. She has no wheezes. Abdominal: Soft. Bowel sounds are normal. There is no tenderness. Musculoskeletal: Normal range of motion. Lymphadenopathy:  
  She has no cervical adenopathy. Neurological: She is alert and oriented to person, place, and time. No cranial nerve deficit. Coordination normal.  
Skin: Skin is warm and dry. No rash noted. Psychiatric: She has a normal mood and affect. Her behavior is normal. Judgment and thought content normal.  
Nursing note and vitals reviewed. Diagnostic Study Results Labs - No results found for this or any previous visit (from the past 12 hour(s)). Radiologic Studies - No orders to display CT Results  (Last 48 hours) None CXR Results  (Last 48 hours) None Medical Decision Making I am the first provider for this patient. I reviewed the vital signs, available nursing notes, past medical history, past surgical history, family history and social history. Vital Signs-Reviewed the patient's vital signs. Records Reviewed: Nursing Notes Disposition: 
home DISCHARGE NOTE:  
 
 
 
  Care plan outlined and precautions discussed. Patient has no new complaints, changes, or physical findings. All medications were reviewed with the patient; will d/c home with flonase and claritin. All of pt's questions and concerns were addressed. Patient was instructed and agrees to follow up with PCP, as well as to return to the ED upon further deterioration. Patient is ready to go home. Follow-up Information Follow up With Specialties Details Why Contact Info  
 daniel Turner MD [facility] In 2 days  [address] [place] [address] Box [facility] 
[phone] Discharge Medication List as of 5/5/2019  9:04 PM  
  
START taking these medications Details  
fluticasone propionate (FLONASE) 50 mcg/actuation nasal spray 2 Sprays by Both Nostrils route daily. , Print, Disp-1 Bottle, R-0  
  
loratadine (CLARITIN) 10 mg tablet Take 1 Tab by mouth daily. , Normal, Disp-10 Tab, R-0  
  
  
CONTINUE these medications which have NOT CHANGED Details PNV with Ca #68-Iron-FA #1-DHA (PNV-OMEGA) 28-1-300 mg cap Take 1 Cap by mouth daily. , Print, Disp-20 Cap, R-0  
  
ondansetron (ZOFRAN ODT) 4 mg disintegrating tablet Take 1 Tab by mouth every eight (8) hours as needed for Nausea. , Print, Disp-10 Tab, R-0  
  
ALBUTEROL IN Take  by inhalation. , Historical Med  
  
  
STOP taking these medications Cetirizine (ZYRTEC) 10 mg cap Comments:  
Reason for Stopping:   
   
 diphenhydrAMINE (BENADRYL) 25 mg capsule Comments:  
Reason for Stopping:   
   
  
 
 
Provider Notes (Medical Decision Making): DDX ear effusion seasonal allergies otitis media otitis externa Procedures: 
Procedures Diagnosis Clinical Impression: 1. Ear fullness, right 2. H/O seasonal allergies

## 2019-05-06 NOTE — ED TRIAGE NOTES
Pt reports R ear fullness w/ some pain. Pt reports that she can hear but not at the same level as in the L. Pt reports that she is 36 wks pregnant. Pt denies any abd pain or cramping.

## 2019-05-06 NOTE — ED NOTES
Pt arrived to ED via ambulatory with c/o right ear fullness and pain x2 days. Pt. Is 36 weeks pregnant with SHAMIKA June 3rd 2019 and registered [facility] clinic. Lungs clear. Pt is in no acute distress. Will continue to monitor. See nursing assessment. Safety precautions in place; call light within reach. Emergency Department Nursing Plan of Care The Nursing Plan of Care is developed from the Nursing assessment and Emergency Department Attending provider initial evaluation. The plan of care may be reviewed in the ED Provider note. The Plan of Care was developed with the following considerations:  
Patient / Family readiness to learn indicated by:verbalized understanding Persons(s) to be included in education: patient Barriers to Learning/Limitations:No 
 
Signed Chidi Cueva RN   
5/5/2019   8:39 PM

## 2019-05-18 ENCOUNTER — HOSPITAL ENCOUNTER (EMERGENCY)
Age: 24
Discharge: HOME | End: 2019-05-18
Attending: EMERGENCY MEDICINE | Admitting: OBSTETRICS & GYNECOLOGY
Payer: MEDICAID

## 2019-05-18 VITALS
SYSTOLIC BLOOD PRESSURE: 127 MMHG | DIASTOLIC BLOOD PRESSURE: 90 MMHG | OXYGEN SATURATION: 99 % | RESPIRATION RATE: 20 BRPM | BODY MASS INDEX: 23.19 KG/M2 | HEIGHT: 68 IN | WEIGHT: 153 LBS | TEMPERATURE: 99.7 F | HEART RATE: 110 BPM

## 2019-05-18 DIAGNOSIS — O26.899 ABDOMINAL CRAMPING AFFECTING PREGNANCY: ICD-10-CM

## 2019-05-18 DIAGNOSIS — Z3A.37 37 WEEKS GESTATION OF PREGNANCY: Primary | ICD-10-CM

## 2019-05-18 DIAGNOSIS — R11.10 ACUTE VOMITING: ICD-10-CM

## 2019-05-18 DIAGNOSIS — O36.8131 DECREASED FETAL MOVEMENTS IN THIRD TRIMESTER, FETUS 1 OF MULTIPLE GESTATION: ICD-10-CM

## 2019-05-18 DIAGNOSIS — R10.9 ABDOMINAL CRAMPING AFFECTING PREGNANCY: ICD-10-CM

## 2019-05-18 LAB
ALBUMIN SERPL-MCNC: 3 G/DL (ref 3.5–5)
ALBUMIN/GLOB SERPL: 0.7 {RATIO} (ref 1.1–2.2)
ALP SERPL-CCNC: 180 U/L (ref 45–117)
ALT SERPL-CCNC: 25 U/L (ref 12–78)
ANION GAP SERPL CALC-SCNC: 11 MMOL/L (ref 5–15)
APPEARANCE UR: CLEAR
AST SERPL-CCNC: 36 U/L (ref 15–37)
BACTERIA URNS QL MICRO: NEGATIVE /HPF
BILIRUB SERPL-MCNC: 0.3 MG/DL (ref 0.2–1)
BILIRUB UR QL: NEGATIVE
BUN SERPL-MCNC: 7 MG/DL (ref 6–20)
BUN/CREAT SERPL: 8 (ref 12–20)
CALCIUM SERPL-MCNC: 8.7 MG/DL (ref 8.5–10.1)
CHLORIDE SERPL-SCNC: 106 MMOL/L (ref 97–108)
CO2 SERPL-SCNC: 23 MMOL/L (ref 21–32)
COLOR UR: ABNORMAL
CREAT SERPL-MCNC: 0.86 MG/DL (ref 0.55–1.02)
EPITH CASTS URNS QL MICRO: ABNORMAL /LPF
ERYTHROCYTE [DISTWIDTH] IN BLOOD BY AUTOMATED COUNT: 14.6 % (ref 11.5–14.5)
GLOBULIN SER CALC-MCNC: 4.2 G/DL (ref 2–4)
GLUCOSE SERPL-MCNC: 83 MG/DL (ref 65–100)
GLUCOSE UR STRIP.AUTO-MCNC: NEGATIVE MG/DL
HCT VFR BLD AUTO: 37.2 % (ref 35–47)
HGB BLD-MCNC: 12 G/DL (ref 11.5–16)
HGB UR QL STRIP: NEGATIVE
HYALINE CASTS URNS QL MICRO: ABNORMAL /LPF (ref 0–5)
KETONES UR QL STRIP.AUTO: NEGATIVE MG/DL
LEUKOCYTE ESTERASE UR QL STRIP.AUTO: ABNORMAL
MCH RBC QN AUTO: 28.2 PG (ref 26–34)
MCHC RBC AUTO-ENTMCNC: 32.3 G/DL (ref 30–36.5)
MCV RBC AUTO: 87.3 FL (ref 80–99)
NITRITE UR QL STRIP.AUTO: NEGATIVE
NRBC # BLD: 0 K/UL (ref 0–0.01)
NRBC BLD-RTO: 0 PER 100 WBC
PH UR STRIP: 7 [PH] (ref 5–8)
PLATELET # BLD AUTO: 191 K/UL (ref 150–400)
PMV BLD AUTO: 11.8 FL (ref 8.9–12.9)
POTASSIUM SERPL-SCNC: 3.7 MMOL/L (ref 3.5–5.1)
PROT SERPL-MCNC: 7.2 G/DL (ref 6.4–8.2)
PROT UR STRIP-MCNC: NEGATIVE MG/DL
RBC # BLD AUTO: 4.26 M/UL (ref 3.8–5.2)
RBC #/AREA URNS HPF: ABNORMAL /HPF (ref 0–5)
SODIUM SERPL-SCNC: 140 MMOL/L (ref 136–145)
SP GR UR REFRACTOMETRY: 1.01 (ref 1–1.03)
UA: UC IF INDICATED,UAUC: ABNORMAL
UROBILINOGEN UR QL STRIP.AUTO: 0.2 EU/DL (ref 0.2–1)
WBC # BLD AUTO: 18.7 K/UL (ref 3.6–11)
WBC URNS QL MICRO: ABNORMAL /HPF (ref 0–4)

## 2019-05-18 PROCEDURE — 99285 EMERGENCY DEPT VISIT HI MDM: CPT

## 2019-05-18 PROCEDURE — 74011250636 HC RX REV CODE- 250/636: Performed by: OBSTETRICS & GYNECOLOGY

## 2019-05-18 PROCEDURE — 96374 THER/PROPH/DIAG INJ IV PUSH: CPT

## 2019-05-18 PROCEDURE — 81001 URINALYSIS AUTO W/SCOPE: CPT

## 2019-05-18 PROCEDURE — 74011250637 HC RX REV CODE- 250/637: Performed by: OBSTETRICS & GYNECOLOGY

## 2019-05-18 PROCEDURE — 96360 HYDRATION IV INFUSION INIT: CPT

## 2019-05-18 PROCEDURE — 87086 URINE CULTURE/COLONY COUNT: CPT

## 2019-05-18 PROCEDURE — 75810000275 HC EMERGENCY DEPT VISIT NO LEVEL OF CARE

## 2019-05-18 PROCEDURE — 80053 COMPREHEN METABOLIC PANEL: CPT

## 2019-05-18 PROCEDURE — 36415 COLL VENOUS BLD VENIPUNCTURE: CPT

## 2019-05-18 PROCEDURE — 85027 COMPLETE CBC AUTOMATED: CPT

## 2019-05-18 PROCEDURE — 96361 HYDRATE IV INFUSION ADD-ON: CPT

## 2019-05-18 RX ORDER — ONDANSETRON 2 MG/ML
4 INJECTION INTRAMUSCULAR; INTRAVENOUS
Status: DISCONTINUED | OUTPATIENT
Start: 2019-05-18 | End: 2019-05-18 | Stop reason: HOSPADM

## 2019-05-18 RX ORDER — OXYCODONE AND ACETAMINOPHEN 5; 325 MG/1; MG/1
1 TABLET ORAL
Status: COMPLETED | OUTPATIENT
Start: 2019-05-18 | End: 2019-05-18

## 2019-05-18 RX ORDER — OXYCODONE AND ACETAMINOPHEN 5; 325 MG/1; MG/1
1 TABLET ORAL
Qty: 5 TAB | Refills: 0 | Status: SHIPPED | OUTPATIENT
Start: 2019-05-18 | End: 2019-05-21

## 2019-05-18 RX ORDER — SODIUM CHLORIDE, SODIUM LACTATE, POTASSIUM CHLORIDE, CALCIUM CHLORIDE 600; 310; 30; 20 MG/100ML; MG/100ML; MG/100ML; MG/100ML
1000 INJECTION, SOLUTION INTRAVENOUS ONCE
Status: COMPLETED | OUTPATIENT
Start: 2019-05-18 | End: 2019-05-18

## 2019-05-18 RX ADMIN — OXYCODONE AND ACETAMINOPHEN 1 TABLET: 5; 325 TABLET ORAL at 12:21

## 2019-05-18 RX ADMIN — SODIUM CHLORIDE, SODIUM LACTATE, POTASSIUM CHLORIDE, AND CALCIUM CHLORIDE 1000 ML/HR: 600; 310; 30; 20 INJECTION, SOLUTION INTRAVENOUS at 10:40

## 2019-05-18 RX ADMIN — ONDANSETRON 4 MG: 2 INJECTION INTRAMUSCULAR; INTRAVENOUS at 11:13

## 2019-05-18 NOTE — LETTER
[address] 
 [address] [id number] 
[phone] Work Note Date: 5/18/2019 To Whom It May concern: 
 
Juan Gore was seen and treated today at [facility]. Please excuse her absence from work on 5/18/19 and 5/19/19.   
 
 
 
Sincerely, 
 
 
 
 
Elie Mclaughlni MD

## 2019-05-18 NOTE — PROGRESS NOTES
Feeling better s/p percocet. No further N/V. Would like to go home. 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 Visit Vitals /90 (BP 1 Location: Right arm, BP Patient Position: At rest) Pulse (!) 110 Temp 99.7 °F (37.6 °C) Resp 20 Ht 5' 8\" (1.727 m) Wt 69.4 kg (153 lb) LMP 09/25/2018 SpO2 99% Breastfeeding? No  
BMI 23.26 kg/m² [place]: irritable FHR: 140, category 1 D/c home. Rx percocet. F/u w/ OB as scheduled, sooner prn. Reviewed precautions and indications for return. Spent 60 minutes with patient. Greater than 50% of time was spent counseling and coordinating care.

## 2019-05-18 NOTE — PROGRESS NOTES
200  arrived from ER with c/o lower right side back pain. EFmx2 applied fetal heart tones found in left lower abdomen. Positive fetal movement reported by pt. 
 
1035 Pt vomited 100cc clear liquid 1113 Pt medicated with zofran 4 mg for vomitting. Pt vomited 50cc clear liquid. 1200 Dr Umer Morales at bedside to assess pt. 
 
1202 SVE closed 1501 [facility] ambulated to bathroom without difficulty. 1231 Pt back to bed from bathroom 1320 Discharge instructions given and reviewed with pt. Pt states understanding. EFM off for pt to change for discharge. 1324 Vital signs stable abdomen soft and non tender. Pt discharged ambulatory out front door of unit with FOB.

## 2019-05-18 NOTE — H&P
History & Physical 
 
Name: Cedric Estevez MRN: [id number]  SSN: [id number] YOB: 1995  Age: 30 y.o. Sex: female Subjective:  
 
Estimated Date of Delivery: 6/3/19 OB History  Para Term  AB Living 1 SAB TAB Ectopic Molar Multiple Live Births # Outcome Date GA Lbr Miah/2nd Weight Sex Delivery Anes PTL Lv  
1 Current Ms. Benitez Arreola is a 29 y/o  with pregnancy at 37w5d by first trimester ultrasound c/o 2 days of constant lower right back pain, 10/10. No relief with tylenol. Some relief with position changes. No aggravating factors. C/o associated N/V x1 day - unable to tolerate any PO. Denies contractions, leakage of fluid or vaginal bleeding. Denies dysuria, urgency or frequency. Last BM 3 days ago - usually has a daily BM. Denies feeling constipated. No diarrhea. Denies sick contacts. Denies CP/SOB. Good fetal movement. Prenatal course complicated by: 
- anemia, iron supplementation 
- asthma, inhaler Please see prenatal records for details. Past Medical History:  
Diagnosis Date  Asthma Past Surgical History:  
Procedure Laterality Date  HX ACL RECONSTRUCTION Left 2012  HX ORTHOPAEDIC  2012  
 left ACL repaired Social History Occupational History  Not on file Tobacco Use  Smoking status: Never Smoker  Smokeless tobacco: Never Used Substance and Sexual Activity  Alcohol use: No  
 Drug use: No  
 Sexual activity: Yes  
  Partners: Male Birth control/protection: None Comment: Pt is 36 weeks pregnant Family History Problem Relation Age of Onset  No Known Problems Mother  Cancer Father  No Known Problems Sister  No Known Problems Brother  Cancer Maternal Grandmother  Cancer Maternal Grandfather  Cancer Paternal Grandmother  Cancer Paternal Grandfather Allergies Allergen Reactions  Latex Other (comments) Skin irritation  Amoxicillin Itching  Pcn [Penicillins] Hives  Shellfish Derived Itching Prior to Admission medications Medication Sig Start Date End Date Taking? Authorizing Provider  
ondansetron (ZOFRAN ODT) 4 mg disintegrating tablet Take 1 Tab by mouth every eight (8) hours as needed for Nausea. 1/8/19  Yes Lucía Art MD  
PNV with Ca #68-Iron-FA #1-DHA (PNV-OMEGA) 28-1-300 mg cap Take 1 Cap by mouth daily. 1/8/19  Yes Lucía Art MD  
fluticasone propionate (FLONASE) 50 mcg/actuation nasal spray 2 Sprays by Both Nostrils route daily. 5/5/19   Leontine Laisha, NP  
loratadine (CLARITIN) 10 mg tablet Take 1 Tab by mouth daily. 5/5/19   Leontine Laisha, NP  
ALBUTEROL IN Take  by inhalation. Other, MD Manuel  
  
 
Review of Systems Constitutional: Negative. HENT: Negative. Eyes: Negative. Respiratory: Negative. Cardiovascular: Negative. Gastrointestinal: Positive for nausea and vomiting. Negative for abdominal distention, abdominal pain, anal bleeding, blood in stool, constipation, diarrhea and rectal pain. Endocrine: Negative. Genitourinary: Negative. Musculoskeletal: Positive for back pain (right lower back). Negative for arthralgias, gait problem, joint swelling, myalgias, neck pain and neck stiffness. Skin: Negative. Allergic/Immunologic: Negative. Neurological: Negative. Hematological: Negative. Psychiatric/Behavioral: Negative. All other systems reviewed and are negative. Objective:  
 
Vitals: 
Vitals:  
 05/18/19 0949 05/18/19 1008 BP: 128/80 127/90 Pulse: 94 (!) 110 Resp: 18 20 Temp: 99 °F (37.2 °C) 99.7 °F (37.6 °C) SpO2: 100% 99% Weight:  69.4 kg (153 lb) Height:  5' 8\" (1.727 m) Physical Exam  
Constitutional: She is oriented to person, place, and time. She appears well-developed and well-nourished. No distress.   
Appears mildly uncomfortable - leaning toward left side to avoid pressure on right side HENT:  
Head: Normocephalic and atraumatic. Neck: Normal range of motion. Neck supple. Cardiovascular: Normal rate, regular rhythm and normal heart sounds. Exam reveals no gallop and no friction rub. No murmur heard. Pulmonary/Chest: Effort normal and breath sounds normal. No respiratory distress. She has no wheezes. She has no rales. She exhibits no tenderness. Abdominal: Soft. Bowel sounds are normal. She exhibits no distension and no mass. There is no tenderness. There is no rebound, no guarding and no CVA tenderness. gravid Genitourinary: Vagina normal. No labial fusion. There is no rash, tenderness, lesion or injury on the right labia. There is no rash, tenderness, lesion or injury on the left labia. Uterus is enlarged (gravid). Uterus is not tender. No erythema, tenderness or bleeding in the vagina. No foreign body in the vagina. No signs of injury around the vagina. No vaginal discharge found. Musculoskeletal: Normal range of motion. She exhibits no edema, tenderness or deformity. Neurological: She is alert and oriented to person, place, and time. Skin: Skin is warm and dry. She is not diaphoretic. No erythema. Psychiatric: She has a normal mood and affect. Her behavior is normal.  
 
 
Cervical Exam: FT/thick/-3 Uterine Activity: Q2-4 min Membranes: intact Fetal Heart Rate: 140, category 1 Prenatal Labs:  
No results found for: ABORH, RUBELLAEXT, GRBSEXT, HBSAGEXT, HIVEXT, RPREXT, GONNOEXT, CHLAMEXT, ABORHEXT, RUBELLAEXT, GRBSEXT, HBSAGEXT, HIVEXT, RPREXT, GONNOEXT, CHLAMEXT, ABORHEXT, RUBELLAEXT, GRBSEXT, HBSAGEXT, HIVEXT, RPREXT, GONNOEXT, CHLAMEXT Recent Results (from the past 12 hour(s)) URINALYSIS W/ REFLEX CULTURE Collection Time: 05/18/19 10:44 AM  
Result Value Ref Range Color YELLOW/STRAW Appearance CLEAR CLEAR Specific gravity 1.013 1.003 - 1.030    
 pH (UA) 7.0 5.0 - 8.0 Protein NEGATIVE  NEG mg/dL Glucose NEGATIVE  NEG mg/dL Ketone NEGATIVE  NEG mg/dL Bilirubin NEGATIVE  NEG Blood NEGATIVE  NEG Urobilinogen 0.2 0.2 - 1.0 EU/dL Nitrites NEGATIVE  NEG Leukocyte Esterase MODERATE (A) NEG    
 WBC 5-10 0 - 4 /hpf  
 RBC 0-5 0 - 5 /hpf Epithelial cells FEW FEW /lpf Bacteria NEGATIVE  NEG /hpf  
 UA:UC IF INDICATED URINE CULTURE ORDERED (A) CNI Hyaline cast 0-2 0 - 5 /lpf  
CBC W/O DIFF Collection Time: 19 10:48 AM  
Result Value Ref Range WBC 18.7 (H) 3.6 - 11.0 K/uL  
 RBC 4.26 3.80 - 5.20 M/uL  
 HGB 12.0 11.5 - 16.0 g/dL HCT 37.2 35.0 - 47.0 % MCV 87.3 80.0 - 99.0 FL  
 MCH 28.2 26.0 - 34.0 PG  
 MCHC 32.3 30.0 - 36.5 g/dL  
 RDW 14.6 (H) 11.5 - 14.5 % PLATELET 288 885 - 681 K/uL MPV 11.8 8.9 - 12.9 FL  
 NRBC 0.0 0  WBC ABSOLUTE NRBC 0.00 0.00 - 0.01 K/uL METABOLIC PANEL, COMPREHENSIVE Collection Time: 19 10:48 AM  
Result Value Ref Range Sodium 140 136 - 145 mmol/L Potassium 3.7 3.5 - 5.1 mmol/L Chloride 106 97 - 108 mmol/L  
 CO2 23 21 - 32 mmol/L Anion gap 11 5 - 15 mmol/L Glucose 83 65 - 100 mg/dL BUN 7 6 - 20 MG/DL Creatinine 0.86 0.55 - 1.02 MG/DL  
 BUN/Creatinine ratio 8 (L) 12 - 20 GFR est AA >60 >60 ml/min/1.73m2 GFR est non-AA >60 >60 ml/min/1.73m2 Calcium 8.7 8.5 - 10.1 MG/DL Bilirubin, total 0.3 0.2 - 1.0 MG/DL  
 ALT (SGPT) 25 12 - 78 U/L  
 AST (SGOT) 36 15 - 37 U/L Alk. phosphatase 180 (H) 45 - 117 U/L Protein, total 7.2 6.4 - 8.2 g/dL Albumin 3.0 (L) 3.5 - 5.0 g/dL Globulin 4.2 (H) 2.0 - 4.0 g/dL A-G Ratio 0.7 (L) 1.1 - 2.2 Impression/Plan: Active Problems: * No active hospital problems. * 
  
28 y/o  @ 37+5 weeks Right back pain: d/dx muscle strain from increased lumbar lordosis, UTI, nephrolithiasis 
- reassuring fetal monitoring - UA wnl 
- no evidence of active labor - patient would like to try percocet for pain - administered Nausea/vomiting - possibly related to above/pain 
- resolving s/p IV hydration & zofran

## 2019-05-18 NOTE — DISCHARGE INSTRUCTIONS
Patient Education        Backache During Pregnancy: Care Instructions  Your Care Instructions    Back pain has many possible causes. It is often caused by problems with muscles and ligaments in your back. The extra weight during pregnancy can put stress on your back. Moving, lifting, standing, sitting, or sleeping in an awkward way also can strain your back. Back pain can also be a sign of labor. Although it may hurt a lot, back pain often improves on its own. Use good home treatment, and take care not to stress your back. Follow-up care is a key part of your treatment and safety. Be sure to make and go to all appointments, and call your doctor if you are having problems. It's also a good idea to know your test results and keep a list of the medicines you take. How can you care for yourself at home? · Ask your doctor about taking acetaminophen (Tylenol) for pain. Do not take aspirin, ibuprofen (Advil, Motrin), or naproxen (Aleve). · Do not take two or more pain medicines at the same time unless the doctor told you to. Many pain medicines have acetaminophen, which is Tylenol. Too much acetaminophen (Tylenol) can be harmful. · Lie on your side with your knees and hips bent and a pillow between your legs. This reduces stress on your back. · Put ice or cold packs on your back for 10 to 20 minutes at a time, several times a day. Put a thin cloth between the ice and your skin. · Warm baths may also help reduce pain. · Change positions every 30 minutes. Take breaks if you must sit for a long time. Get up and walk around. · Ask your doctor about how much exercise you can do. You may feel better taking short walks or doing gentle movements and stretching in a swimming pool. · Ask your doctor about exercises to stretch and strengthen your back. When should you call for help?   Call your doctor now or seek immediate medical care if:    · You think you are in labor.     · You have new numbness in your buttocks, genital or rectal areas, or legs.     · You have a new loss of bowel or bladder control.    Watch closely for changes in your health, and be sure to contact your doctor if:    · You do not get better as expected. Where can you learn more? Go to http://dee-chuy.info/. Enter X927 in the search box to learn more about \"Backache During Pregnancy: Care Instructions. \"  Current as of: September 5, 2018  Content Version: 11.9  © 3230-5601 [employer]. Care instructions adapted under license by [employer] (which disclaims liability or warranty for this information). If you have questions about a medical condition or this instruction, always ask your healthcare professional. [address] any warranty or liability for your use of this information.

## 2019-05-19 ENCOUNTER — APPOINTMENT (OUTPATIENT)
Dept: ULTRASOUND IMAGING | Age: 24
End: 2019-05-19
Attending: OBSTETRICS & GYNECOLOGY
Payer: MEDICAID

## 2019-05-19 ENCOUNTER — HOSPITAL ENCOUNTER (EMERGENCY)
Age: 24
Discharge: HOME | End: 2019-05-19
Attending: EMERGENCY MEDICINE | Admitting: SPECIALIST
Payer: MEDICAID

## 2019-05-19 VITALS
RESPIRATION RATE: 17 BRPM | SYSTOLIC BLOOD PRESSURE: 126 MMHG | OXYGEN SATURATION: 99 % | TEMPERATURE: 99.3 F | BODY MASS INDEX: 22.46 KG/M2 | DIASTOLIC BLOOD PRESSURE: 88 MMHG | WEIGHT: 147.71 LBS | HEART RATE: 93 BPM

## 2019-05-19 DIAGNOSIS — R10.84 GENERALIZED ABDOMINAL PAIN: ICD-10-CM

## 2019-05-19 DIAGNOSIS — R10.9 FLANK PAIN: ICD-10-CM

## 2019-05-19 DIAGNOSIS — Z3A.38 38 WEEKS GESTATION OF PREGNANCY: Primary | ICD-10-CM

## 2019-05-19 LAB
APPEARANCE UR: ABNORMAL
BACTERIA SPEC CULT: NORMAL
BACTERIA URNS QL MICRO: ABNORMAL /HPF
BILIRUB UR QL: NEGATIVE
CC UR VC: NORMAL
COLOR UR: ABNORMAL
EPITH CASTS URNS QL MICRO: ABNORMAL /LPF
GLUCOSE UR STRIP.AUTO-MCNC: NEGATIVE MG/DL
HGB UR QL STRIP: ABNORMAL
KETONES UR QL STRIP.AUTO: NEGATIVE MG/DL
LEUKOCYTE ESTERASE UR QL STRIP.AUTO: ABNORMAL
NITRITE UR QL STRIP.AUTO: NEGATIVE
PH UR STRIP: 8 [PH] (ref 5–8)
PROT UR STRIP-MCNC: NEGATIVE MG/DL
RBC #/AREA URNS HPF: ABNORMAL /HPF (ref 0–5)
SERVICE CMNT-IMP: NORMAL
SP GR UR REFRACTOMETRY: 1.01 (ref 1–1.03)
UA: UC IF INDICATED,UAUC: ABNORMAL
UROBILINOGEN UR QL STRIP.AUTO: 0.2 EU/DL (ref 0.2–1)
WBC URNS QL MICRO: ABNORMAL /HPF (ref 0–4)
YEAST URNS QL MICRO: PRESENT

## 2019-05-19 PROCEDURE — 76770 US EXAM ABDO BACK WALL COMP: CPT

## 2019-05-19 PROCEDURE — 99285 EMERGENCY DEPT VISIT HI MDM: CPT

## 2019-05-19 PROCEDURE — 81001 URINALYSIS AUTO W/SCOPE: CPT

## 2019-05-19 PROCEDURE — 75810000275 HC EMERGENCY DEPT VISIT NO LEVEL OF CARE

## 2019-05-19 PROCEDURE — 59025 FETAL NON-STRESS TEST: CPT

## 2019-05-19 PROCEDURE — 76705 ECHO EXAM OF ABDOMEN: CPT

## 2019-05-19 PROCEDURE — 87086 URINE CULTURE/COLONY COUNT: CPT

## 2019-05-19 NOTE — PROGRESS NOTES
Patient discharged home. Verbalized understanding. All questions answered. No distress noted.  Signed copy of discharge instructions on paper chart.

## 2019-05-19 NOTE — PROGRESS NOTES
1250: 30 y.o. Pt[facility] presents to the [facility] today after being seen yesterday here for c/o N/V and right lower constant back pain. She was seen yesterday by Dr. Herlinda Barrow and was sent home with a prescription for Nausea medicine as well as pain medicine. Per the patient she felt better while she was here but has been hurting since she got home. She has not slept and is unable to keep food and drink down. Pt. Denies LOF, Vag. Bleeding, HA, or blurred vision. Pt. Reports + FM. EFM applied x 2.  
 
1255: pt. vomited 100 ml's. Wet wash cloth given. New emesis bag given. Emotional support provided. Will notify Dr. Sarah Stoner of pt's arrival.  
 
1325: Dr. Sarah Stoner in to see pt. SVE done. POC being discussed. New orders received. 1338: Pt. OOB to bathroom with assistance

## 2019-05-19 NOTE — DISCHARGE INSTRUCTIONS
Patient Education        Week 37 of Your Pregnancy: Care Instructions  Your Care Instructions    You are near the end of your pregnancy--and you're probably pretty uncomfortable. It may be harder to walk around. Lying down probably isn't comfortable either. You may have trouble getting to sleep or staying asleep. Most women deliver their babies between 40 and 41 weeks. This is a good time to think about packing a bag for the hospital with items you'll need. Then you'll be ready when labor starts. Follow-up care is a key part of your treatment and safety. Be sure to make and go to all appointments, and call your doctor if you are having problems. It's also a good idea to know your test results and keep a list of the medicines you take. How can you care for yourself at home? Learn about breastfeeding  · Breastfeeding is best for your baby and good for you. · Breast milk has antibodies to help your baby fight infections. · Mothers who breastfeed often lose weight faster, because making milk burns calories. · Learning the best ways to hold your baby will make breastfeeding easier. · Let your partner bathe and diaper the baby to keep your partner from feeling left out. Snuggle together when you breastfeed. · You may want to learn how to use a breast pump and store your milk. · If you choose to bottle feed, make the feeding feel like breastfeeding so you can bond with your baby. Always hold your baby and the bottle. Do not prop bottles or let your baby fall asleep with a bottle. Learn about crying  · It is common for babies to cry for 1 to 3 hours a day. Some cry more, some cry less. · Babies don't cry to make you upset or because you are a bad parent. · Crying is how your baby communicates. Your baby may be hungry; have gas; need a diaper change; or feel cold, warm, tired, lonely, or tense. Sometimes babies cry for unknown reasons.   · If you respond to your baby's needs, he or she will learn to trust you.  · Try to stay calm when your baby cries. Your baby may get more upset if he or she senses that you are upset. Know how to care for your   · Your baby's umbilical cord stump will drop off on its own, usually between 1 and 2 weeks. To care for your baby's umbilical cord area:  ? Clean the area at the bottom of the cord 2 or 3 times a day. ? Pay special attention to the area where the cord attaches to the skin. ? Keep the diaper folded below the cord. ? Use a damp washcloth or cotton ball to sponge bathe your baby until the stump has come off. · Your baby's first dark stool is called meconium. After the meconium is passed, your baby will develop his or her own bowel pattern. ? Some babies, especially  babies, have several bowel movements a day. Others have one or two a day, or one every 2 to 3 days. ?  babies often have loose, yellow stools. Formula-fed babies have more formed stools. ? If your baby's stools look like little pellets, he or she is constipated. After 2 days of constipation, call your baby's doctor. · If your baby will be circumcised, you can care for him at home. ? Gently rinse his penis with warm water after every diaper change. Do not try to remove the film that forms on the penis. This film will go away on its own. Pat dry. ? Put petroleum ointment, such as Vaseline, on the area of the diaper that will touch your baby's penis. This will keep the diaper from sticking to your baby. ? Ask the doctor about giving your baby acetaminophen (Tylenol) for pain. Where can you learn more? Go to http://dee-chuy.info/. Enter 68 [phone] [id number] in the search box to learn more about \"Week 37 of Your Pregnancy: Care Instructions. \"  Current as of: 2018  Content Version: 11.9  © 8736-1178 [employer]. Care instructions adapted under license by [employer] (which disclaims liability or warranty for this information).  If you have questions about a medical condition or this instruction, always ask your healthcare professional. [place] [address] any warranty or liability for your use of this information.

## 2019-05-19 NOTE — H&P
OB History & Physical 
 
Name: Vik Harrison MRN: [id number]  SSN: [id number] YOB: 1995  Age: 30 y.o. Sex: female Subjective:  
 
Reason for Admission:  Pregnancy and right flank and RLQ pain History of Present Illness: Vik Harrison is a 30 y.o.  female with an estimated gestational age of 41w10d with Estimated Date of Delivery: 6/3/19. Patient complains of persistent and progressive RLQ and right flank pain, started about 3 days ago. Also has had several episodes of N/V. Denies [address]. Denies contractions, ROM, bleeding. Baby active. Also, patient reports last BM was 4 days ago; usually has BM daily. Has had issues with constipation during the pregnancy. OB History [facility] 1 Para Term  [facility] Living [facility]  
   
 TAB Ectopic Molar Multiple Live Births Past Medical History:  
Diagnosis Date  Asthma Past Surgical History:  
Procedure Laterality Date  HX ACL RECONSTRUCTION Left 2012  HX ORTHOPAEDIC  2012  
 left ACL repaired Social History Occupational History  Not on file Tobacco Use  Smoking status: Never Smoker  Smokeless tobacco: Never Used Substance and Sexual Activity  Alcohol use: No  
 Drug use: No  
 Sexual activity: Yes  
  Partners: Male Birth control/protection: None Comment: Pt is 36 weeks pregnant Family History Problem Relation Age of Onset  No Known Problems Mother  Cancer Father  No Known Problems Sister  No Known Problems Brother  Cancer Maternal Grandmother  Cancer Maternal Grandfather  Cancer Paternal Grandmother  Cancer Paternal Grandfather Allergies Allergen Reactions  Latex Other (comments) Skin irritation  Amoxicillin Itching  Pcn [Penicillins] Hives  Shellfish Derived Itching Prior to Admission medications Medication Sig Start Date End Date Taking? Authorizing Provider  
oxyCODONE-acetaminophen (PERCOCET) 5-325 mg per tablet Take 1 Tab by mouth every six (6) hours as needed for Pain for up to 3 days. Max Daily Amount: 4 Tabs. 19  Tr Bernabe MD  
fluticasone propionate (FLONASE) 50 mcg/actuation nasal spray 2 Sprays by Both Nostrils route daily. 19   Iwona Swain NP  
loratadine (CLARITIN) 10 mg tablet Take 1 Tab by mouth daily. 19   Iwona Swain NP  
ondansetron (ZOFRAN ODT) 4 mg disintegrating tablet Take 1 Tab by mouth every eight (8) hours as needed for Nausea. 19   Lucía Art MD  
PNV with Ca #68-Iron-FA #1-DHA (PNV-OMEGA) 28-1-300 mg cap Take 1 Cap by mouth daily. 19   Lucía Art MD  
ALBUTEROL IN Take  by inhalation. Other, MD Manuel  
  
 
Review of Systems: 
General: Denies fever, sweats, chills CV: Denies CP, palpitations Resp: Denies SOB, cough GI: See HPI 
: Denies dysura, abnormal frequency, hematuria Neuro: Denies HA, visual disturbance All other systems reviewed and are negative Objective:  
 
Vitals:   
Vitals:  
 19 1233 19 1252 BP: 130/88 126/88 Pulse: 100 93 Resp: 18 17 Temp: 97.8 °F (36.6 °C) 99.3 °F (37.4 °C) SpO2: 95% 99% Weight: 67 kg (147 lb 11.3 oz) Temp (24hrs), Av.6 °F (37 °C), Min:97.8 °F (36.6 °C), Max:99.3 °F (37.4 °C) BP  Min: 126/88  Max: 130/88 Physical Exam 
General: in NAD HEENT: normocephalic Back: +CVAT right; none left Abdomen: Gravid, soft,  Mild tenderness right lateral, no abnormal masses, rebound, rigidity, guarding Fundus: soft, nontender Extremities: no abnormal cyanosis, clubbing, edema Skin: warm, dry, no abnormal lesions noted Neurological: DTR's 1-2+ no clonus Pelvic:Cervical Exam: thick and closed Uterine Activity: irregular, mild Membranes: no gross evidence of ROM Fetal Heart Rate: adequate variability and reactivity; no significant abnormal decelerations Labs:  
Recent Results (from the past 24 hour(s)) URINALYSIS W/ REFLEX CULTURE Collection Time: 05/19/19  1:41 PM  
Result Value Ref Range Color YELLOW/STRAW Appearance CLOUDY (A) CLEAR Specific gravity 1.010 1.003 - 1.030    
 pH (UA) 8.0 5.0 - 8.0 Protein NEGATIVE  NEG mg/dL Glucose NEGATIVE  NEG mg/dL Ketone NEGATIVE  NEG mg/dL Bilirubin NEGATIVE  NEG Blood MODERATE (A) NEG Urobilinogen 0.2 0.2 - 1.0 EU/dL Nitrites NEGATIVE  NEG Leukocyte Esterase LARGE (A) NEG    
 WBC PENDING /hpf  
 RBC PENDING /hpf Epithelial cells PENDING /lpf Bacteria PENDING /hpf  
 UA:UC IF INDICATED PENDING Assessment and Plan:  
 
37 weeks, right flank and RLQ pain. Lab evaluation: UA Ultrasound renal and RLQ. Signed By:  Heidi Lambert MD   
 May 19, 2019

## 2019-05-19 NOTE — PROGRESS NOTES
Progress Note Patient seen, fetal heart rate and contraction pattern evaluated. Patient reports back and side still sore, but improved. No more nausea/vomiting. No painful contractions, ROM, bleeding. Baby active. Physical Exam: 
Visit Vitals /88 Pulse 93 Temp 99.3 °F (37.4 °C) Resp 17 Wt 67 kg (147 lb 11.3 oz) LMP 09/25/2018 SpO2 99% BMI 22.46 kg/m² Cervical Exam: not checked Membranes:  intact Uterine Activity: irregular Fetal Heart Rate: 130s,  adequate variability and reactivity Accelerations: yes Decelerations: none Imaging: 
Indication: Right lower quadrant pain. 
  
Ultrasound was performed of the right lower quadrant. 
  
A distended appendix is not identified. Right ovary measures 3.7 x 2.1 x 1.5 cm in close identified. . There is a 1.5 cm 
right ovarian complex cyst. 
  
IMPRESSION IMPRESSION: 
1. A distended appendix is not identified. EXAM:  US RETROPERITONEUM COMP 
  
INDICATION:  right flank pain, 37 weeks pregnant 
  
COMPARISON: None. 
  
TECHNIQUE: 
Real-time sonography of the kidneys, retroperitoneum and bladder was performed 
with multiple static images obtained. 
  
FINDINGS: 
There is mild right hydroureteronephrosis. . Right kidney measures 10.2 cm. 
  
Left kidney is normal measuring 10.2 cm. 
  
. 
  
The aorta is obscured by bowel gas Uterus  The proximal iliac arteries are obscured by bowel gas 
  The IVC is normal. 
  
The urinary bladder is incompletely distended 
  
IMPRESSION IMPRESSION: There is mild right hydroureteronephrosis. . Left kidney is normal. 
 
Assessment/Plan: 
Reassuring fetal status. Discussed essentially normal ultrasound findings. Suspect symptoms are due to constipation--discussed symptomatic   Measures. Discharge home Follow-up with the office in the morning Precautions discussed with patient and her mom--progressive pain, labor, NVFSC. Questions answered. Total time with patient 60 minutes, >50% counseiling and coordination of  care.  
 
Lary Bonilla MD

## 2019-05-20 NOTE — ED PROVIDER NOTES
EMERGENCY DEPARTMENT HISTORY AND PHYSICAL EXAM      Date: 5/19/2019  Patient Name: Keon Reddy  Patient Age and Sex: 30 y.o. female     History of Presenting Illness     Chief Complaint   Patient presents with    Back Pain     37w,6d; lower back pain    Abdominal Pain     Abd cramping, was seen by L&D yesterday and sent home       History Provided By: Patient    HPI: Keon Reddy is a 38-week pregnant female presenting with abdominal pain. Patient states that since yesterday has been having lower abdominal cramping radiating to her low back. Associated with nausea and right flank pain. Patient denies any dysuria, hematuria or fevers. Patient states that her pregnancy has been going without complication thus far. There are no other complaints, changes, or physical findings at this time. PCP: Jovan Rm MD    No current facility-administered medications on file prior to encounter. Current Outpatient Medications on File Prior to Encounter   Medication Sig Dispense Refill    oxyCODONE-acetaminophen (PERCOCET) 5-325 mg per tablet Take 1 Tab by mouth every six (6) hours as needed for Pain for up to 3 days. Max Daily Amount: 4 Tabs. 5 Tab 0    fluticasone propionate (FLONASE) 50 mcg/actuation nasal spray 2 Sprays by Both Nostrils route daily. 1 Bottle 0    loratadine (CLARITIN) 10 mg tablet Take 1 Tab by mouth daily. 10 Tab 0    ondansetron (ZOFRAN ODT) 4 mg disintegrating tablet Take 1 Tab by mouth every eight (8) hours as needed for Nausea. 10 Tab 0    PNV with Ca #68-Iron-FA #1-DHA (PNV-OMEGA) 28-1-300 mg cap Take 1 Cap by mouth daily. 20 Cap 0    ALBUTEROL IN Take  by inhalation.          Past History     Past Medical History:  Past Medical History:   Diagnosis Date    Asthma        Past Surgical History:  Past Surgical History:   Procedure Laterality Date    HX ACL RECONSTRUCTION Left 2012    HX ORTHOPAEDIC  2012    left ACL repaired       Family History:  Family History   Problem Relation Age of Onset    No Known Problems Mother     Cancer Father     No Known Problems Sister     No Known Problems Brother     Cancer Maternal Grandmother     Cancer Maternal Grandfather     Cancer Paternal Grandmother     Cancer Paternal Grandfather        Social History:  Social History     Tobacco Use    Smoking status: Never Smoker    Smokeless tobacco: Never Used   Substance Use Topics    Alcohol use: No    Drug use: No       Allergies: Allergies   Allergen Reactions    Latex Other (comments)     Skin irritation    Amoxicillin Itching    Pcn [Penicillins] Hives    Shellfish Derived Itching         Review of Systems   Review of Systems   Constitutional: Positive for fatigue. Negative for chills and fever. Respiratory: Negative for cough and shortness of breath. Cardiovascular: Negative for chest pain. Gastrointestinal: Positive for abdominal pain and nausea. Negative for constipation, diarrhea and vomiting. Genitourinary: Positive for flank pain. Negative for difficulty urinating, dyspareunia and dysuria. Neurological: Negative for weakness and numbness. All other systems reviewed and are negative. Physical Exam   Physical Exam   Constitutional: She is oriented to person, place, and time. She appears well-developed and well-nourished. HENT:   Head: Normocephalic and atraumatic. Eyes: Conjunctivae and EOM are normal.   Neck: Normal range of motion. Neck supple. Cardiovascular: Normal rate and regular rhythm. Pulmonary/Chest: Effort normal and breath sounds normal. No respiratory distress. Abdominal: Soft. She exhibits no distension. There is tenderness. Very gravid abdomen, tender diffusely as well as over the right CVA   Musculoskeletal: Normal range of motion. Neurological: She is alert and oriented to person, place, and time. Skin: Skin is warm and dry. Psychiatric: She has a normal mood and affect.    Nursing note and vitals reviewed. Diagnostic Study Results     Labs -   No results found for this or any previous visit (from the past 12 hour(s)). Radiologic Studies -   US RETROPERITONEUM COMP   Final Result   IMPRESSION: There is mild right hydroureteronephrosis. . Left kidney is normal.                  US ABD LTD   Final Result   IMPRESSION:   1. A distended appendix is not identified. CT Results  (Last 48 hours)    None        CXR Results  (Last 48 hours)    None            Medical Decision Making   I am the first provider for this patient. I reviewed the vital signs, available nursing notes, past medical history, past surgical history, family history and social history. Vital Signs-Reviewed the patient's vital signs. No data found. Records Reviewed: Nursing Notes and Old Medical Records    Provider Notes (Medical Decision Making):   Presenting for diffuse abdominal pain radiating to her back in addition to flank pain. Differential includes pyelonephritis, UTI, gastroenteritis, OB related. Advise getting a urinalysis and sending her to labor and delivery. ED Course:   Initial assessment performed. The patients presenting problems have been discussed, and they are in agreement with the care plan formulated and outlined with them. I have encouraged them to ask questions as they arise throughout their visit. Critical Care Time:   0    Disposition:  Send to L+D    PLAN:  1. Discharge Medication List as of 5/19/2019  4:42 PM        2. Follow-up Information     Follow up With Specialties Details Why Contact Info    Sondra Warren MD Obstetrics & Gynecology, Gynecology, Obstetrics In 1 week  [facility]  [phone]      [facility], [address] [place] [address]   [phone]          3. Return to ED if worse     Diagnosis     Clinical Impression:   1. 38 weeks gestation of pregnancy    2.  Generalized abdominal pain    3. Flank pain        Attestations:    Patrizia Cheney M.D. Please note that this dictation was completed with [employer], the computer voice recognition software. Quite often unanticipated grammatical, syntax, homophones, and other interpretive errors are inadvertently transcribed by the computer software. Please disregard these errors. Please excuse any errors that have escaped final proofreading. Thank you.

## 2019-05-21 LAB
BACTERIA SPEC CULT: NORMAL
CC UR VC: NORMAL
SERVICE CMNT-IMP: NORMAL

## 2019-06-20 ENCOUNTER — HOSPITAL ENCOUNTER (EMERGENCY)
Age: 24
Discharge: HOME | End: 2019-06-21
Attending: EMERGENCY MEDICINE
Payer: MEDICAID

## 2019-06-20 VITALS
WEIGHT: 128.97 LBS | SYSTOLIC BLOOD PRESSURE: 122 MMHG | HEIGHT: 68 IN | TEMPERATURE: 98.1 F | DIASTOLIC BLOOD PRESSURE: 77 MMHG | HEART RATE: 61 BPM | RESPIRATION RATE: 16 BRPM | BODY MASS INDEX: 19.55 KG/M2 | OXYGEN SATURATION: 100 %

## 2019-06-20 DIAGNOSIS — S31.41XD: Primary | ICD-10-CM

## 2019-06-20 PROCEDURE — 99283 EMERGENCY DEPT VISIT LOW MDM: CPT

## 2019-06-21 RX ORDER — CEPHALEXIN 500 MG/1
500 CAPSULE ORAL 4 TIMES DAILY
Qty: 28 CAP | Refills: 0 | Status: SHIPPED | OUTPATIENT
Start: 2019-06-21 | End: 2019-06-28

## 2019-06-21 NOTE — ED PROVIDER NOTES
EMERGENCY DEPARTMENT HISTORY AND PHYSICAL EXAM      Date: 6/20/2019  Patient Name: Theresa Rodney    History of Presenting Illness     Chief Complaint   Patient presents with    Post OP Complication     pt having increased pain to vaginal area after having sutures post vaginal delivery on 6/7/19; pt denies any other complications       History Provided By: Patient    HPI: Theresa Rodney, 30 y.o. female  presents to the ED with cc of pain in the vaginal area. Patient is 2 to 3 weeks post vaginal delivery. She had a vaginal laceration requiring repair. She has stitches in place. She states the laceration is becoming more painful and tender. She has not noticed any drainage. She has brown lochia. No urinary symptoms. No fevers or chills. She has been taking ibuprofen since delivery. She states her pain was well controlled however the pain is now getting worse in the area of the laceration. There are no other complaints, changes, or physical findings at this time. PCP: Fredy Turk MD    No current facility-administered medications on file prior to encounter. Current Outpatient Medications on File Prior to Encounter   Medication Sig Dispense Refill    fluticasone propionate (FLONASE) 50 mcg/actuation nasal spray 2 Sprays by Both Nostrils route daily. 1 Bottle 0    loratadine (CLARITIN) 10 mg tablet Take 1 Tab by mouth daily. 10 Tab 0    ALBUTEROL IN Take  by inhalation.          Past History     Past Medical History:  Past Medical History:   Diagnosis Date    Asthma        Past Surgical History:  Past Surgical History:   Procedure Laterality Date    HX ACL RECONSTRUCTION Left 2012    HX ORTHOPAEDIC  2012    left ACL repaired       Family History:  Family History   Problem Relation Age of Onset    No Known Problems Mother     Cancer Father     No Known Problems Sister     No Known Problems Brother     Cancer Maternal Grandmother     Cancer Maternal Grandfather     Cancer Paternal Grandmother     Cancer Paternal Grandfather        Social History:  Social History     Tobacco Use    Smoking status: Never Smoker    Smokeless tobacco: Never Used   Substance Use Topics    Alcohol use: No    Drug use: No       Allergies: Allergies   Allergen Reactions    Latex Other (comments)     Skin irritation    Amoxicillin Itching    Pcn [Penicillins] Hives    Shellfish Derived Itching         Review of Systems   Review of Systems   Constitutional: Negative for chills and fever. HENT: Negative for congestion, ear pain, rhinorrhea, sore throat and trouble swallowing. Eyes: Negative for visual disturbance. Respiratory: Negative for cough, chest tightness and shortness of breath. Cardiovascular: Negative for chest pain and palpitations. Gastrointestinal: Negative for abdominal pain, blood in stool, constipation, diarrhea, nausea and vomiting. Genitourinary: Positive for vaginal pain. Negative for decreased urine volume, difficulty urinating, dysuria and frequency. Musculoskeletal: Negative for back pain and neck pain. Skin: Negative for color change and rash. Neurological: Negative for dizziness, weakness, light-headedness and headaches. Physical Exam   Physical Exam   Constitutional: She is oriented to person, place, and time. She appears well-developed and well-nourished. She does not appear ill. No distress. HENT:   Mouth/Throat: Oropharynx is clear and moist.   Eyes: Conjunctivae are normal.   Neck: Neck supple. Cardiovascular: Normal rate and regular rhythm. Pulmonary/Chest: Effort normal and breath sounds normal. No accessory muscle usage. No respiratory distress. Abdominal: Soft. She exhibits no distension. There is no tenderness. Genitourinary:         Lymphadenopathy:     She has no cervical adenopathy. Neurological: She is alert and oriented to person, place, and time. She has normal strength. No cranial nerve deficit or sensory deficit.    Skin: Skin is warm and dry. Nursing note and vitals reviewed. Diagnostic Study Results     Labs -   No results found for this or any previous visit (from the past 24 hour(s)). Radiologic Studies -   No orders to display     CT Results  (Last 48 hours)    None        CXR Results  (Last 48 hours)    None            Medical Decision Making   I am the first provider for this patient. I reviewed the vital signs, available nursing notes, past medical history, past surgical history, family history and social history. Vital Signs-Reviewed the patient's vital signs. Patient Vitals for the past 24 hrs:   Temp Pulse Resp BP SpO2   06/20/19 2356 98.1 °F (36.7 °C) 61 16 122/77 100 %       Records Reviewed: Nursing Notes and Old Medical Records    Provider Notes (Medical Decision Making):   Patient presents with increasing pain and tenderness from a perineal laceration. I do not see any signs of abscess. There is no wound dehiscence. The margins of the laceration are more tender than what they have been according to the patient. I do not see any overt signs of infection however given the increased pain I will start her on Keflex for possible wound infection. ED Course:   Initial assessment performed. The patients presenting problems have been discussed, and they are in agreement with the care plan formulated and outlined with them. I have encouraged them to ask questions as they arise throughout their visit. Orders Placed This Encounter    cephALEXin (KEFLEX) 500 mg capsule         Critical Care Time:   0    Disposition:  Discharge  12:30 AM    The patient's emergency department evaluation is now complete. I have reviewed all labs, imaging, and pertinent information. I have discussed all results with the patient and/or family. Based on our evaluation today I do believe that the patient is safe to be discharged home.   The patient has been provided with at home instructions that are pertinent to their complaint today, although these may not be specific to the exact diagnosis. I have reviewed the patient's home medications and attempted to reconcile if not already done so by pharmacy or nursing staff. I have discussed all new prescriptions with the patient. The patient has been encouraged to follow-up with primary care doctor and/or specialist, and these have been discussed with the patient. The patient has been advised that they may return to the emergency department if they have any worsening symptoms and or new symptoms that are of concern to them. Verbal discharge instructions may have also been provided to the patient that may not be specifically contained in the written discharge instructions. The patient has been given opportunity to ask questions prior to discharge. PLAN:  1. Current Discharge Medication List      START taking these medications    Details   cephALEXin (KEFLEX) 500 mg capsule Take 1 Cap by mouth four (4) times daily for 7 days. Qty: 28 Cap, Refills: 0           2. Follow-up Information     Follow up With Specialties Details Why Contact Info    Your midwife  Schedule an appointment as soon as possible for a visit in 1 week          Return to ED if worse     Diagnosis     Clinical Impression:   1. Perineal laceration involving vagina, subsequent encounter            This note will not be viewable in MyChart.

## 2019-08-27 ENCOUNTER — HOSPITAL ENCOUNTER (EMERGENCY)
Age: 24
Discharge: HOME | End: 2019-08-27
Attending: EMERGENCY MEDICINE
Payer: COMMERCIAL

## 2019-08-27 VITALS
TEMPERATURE: 98.1 F | HEIGHT: 68 IN | OXYGEN SATURATION: 100 % | BODY MASS INDEX: 19.25 KG/M2 | SYSTOLIC BLOOD PRESSURE: 117 MMHG | WEIGHT: 127 LBS | DIASTOLIC BLOOD PRESSURE: 71 MMHG | RESPIRATION RATE: 16 BRPM | HEART RATE: 65 BPM

## 2019-08-27 DIAGNOSIS — N89.8 VAGINAL IRRITATION: Primary | ICD-10-CM

## 2019-08-27 LAB
APPEARANCE UR: ABNORMAL
BACTERIA URNS QL MICRO: NEGATIVE /HPF
BILIRUB UR QL: NEGATIVE
CLUE CELLS VAG QL WET PREP: NORMAL
COLOR UR: ABNORMAL
EPITH CASTS URNS QL MICRO: ABNORMAL /LPF
GLUCOSE UR STRIP.AUTO-MCNC: NEGATIVE MG/DL
HCG UR QL: NEGATIVE
HGB UR QL STRIP: NEGATIVE
KETONES UR QL STRIP.AUTO: NEGATIVE MG/DL
KOH PREP SPEC: NORMAL
LEUKOCYTE ESTERASE UR QL STRIP.AUTO: ABNORMAL
NITRITE UR QL STRIP.AUTO: NEGATIVE
PH UR STRIP: 6 [PH] (ref 5–8)
PROT UR STRIP-MCNC: NEGATIVE MG/DL
RBC #/AREA URNS HPF: ABNORMAL /HPF (ref 0–5)
SERVICE CMNT-IMP: NORMAL
SP GR UR REFRACTOMETRY: 1.02 (ref 1–1.03)
T VAGINALIS VAG QL WET PREP: NORMAL
UA: UC IF INDICATED,UAUC: ABNORMAL
UROBILINOGEN UR QL STRIP.AUTO: 1 EU/DL (ref 0.2–1)
WBC URNS QL MICRO: ABNORMAL /HPF (ref 0–4)

## 2019-08-27 PROCEDURE — 87210 SMEAR WET MOUNT SALINE/INK: CPT

## 2019-08-27 PROCEDURE — 81025 URINE PREGNANCY TEST: CPT

## 2019-08-27 PROCEDURE — 96372 THER/PROPH/DIAG INJ SC/IM: CPT

## 2019-08-27 PROCEDURE — 74011250637 HC RX REV CODE- 250/637: Performed by: PHYSICIAN ASSISTANT

## 2019-08-27 PROCEDURE — 99283 EMERGENCY DEPT VISIT LOW MDM: CPT

## 2019-08-27 PROCEDURE — 74011250636 HC RX REV CODE- 250/636: Performed by: PHYSICIAN ASSISTANT

## 2019-08-27 PROCEDURE — 81001 URINALYSIS AUTO W/SCOPE: CPT

## 2019-08-27 PROCEDURE — 87491 CHLMYD TRACH DNA AMP PROBE: CPT

## 2019-08-27 RX ORDER — AZITHROMYCIN 500 MG/1
1000 TABLET, FILM COATED ORAL
Status: COMPLETED | OUTPATIENT
Start: 2019-08-27 | End: 2019-08-27

## 2019-08-27 RX ORDER — FLUCONAZOLE 150 MG/1
150 TABLET ORAL
Qty: 1 TAB | Refills: 0 | Status: SHIPPED | OUTPATIENT
Start: 2019-08-27 | End: 2019-08-27

## 2019-08-27 RX ADMIN — AZITHROMYCIN 1000 MG: 500 TABLET, FILM COATED ORAL at 12:30

## 2019-08-27 RX ADMIN — LIDOCAINE HYDROCHLORIDE 250 MG: 10 INJECTION, SOLUTION EPIDURAL; INFILTRATION; INTRACAUDAL; PERINEURAL at 12:32

## 2019-08-27 NOTE — ED TRIAGE NOTES
Pt reports vaginal delivery 2 months ago, reports vaginal discharge last night with some itching, denies concern for STD. Reports she is taking PO birth control and that she had a lot of UTIs and yeast infections during her pregnancy.

## 2019-08-27 NOTE — DISCHARGE INSTRUCTIONS
Patient Education        Vaginal Yeast Infection: Care Instructions  Your Care Instructions    A vaginal yeast infection is caused by too many yeast cells in the vagina. This is common in women of all ages. Itching, vaginal discharge and irritation, and other symptoms can bother you. But yeast infections don't often cause other health problems. Some medicines can increase your risk of getting a yeast infection. These include antibiotics, birth control pills, hormones, and steroids. You may also be more likely to get a yeast infection if you are pregnant, have diabetes, douche, or wear tight clothes. With treatment, most yeast infections get better in 2 to 3 days. Follow-up care is a key part of your treatment and safety. Be sure to make and go to all appointments, and call your doctor if you are having problems. It's also a good idea to know your test results and keep a list of the medicines you take. How can you care for yourself at home? · Take your medicines exactly as prescribed. Call your doctor if you think you are having a problem with your medicine. · Ask your doctor about over-the-counter (OTC) medicines for yeast infections. They may cost less than prescription medicines. If you use an OTC treatment, read and follow all instructions on the label. · Do not use tampons while using a vaginal cream or suppository. The tampons can absorb the medicine. Use pads instead. · Wear loose cotton clothing. Do not wear nylon or other fabric that holds body heat and moisture close to the skin. · Try sleeping without underwear. · Do not scratch. Relieve itching with a cold pack or a cool bath. · Do not wash your vaginal area more than once a day. Use plain water or a mild, unscented soap. Air-dry the vaginal area. · Change out of wet swimsuits after swimming. · Do not have sex until you have finished your treatment. · Do not douche. When should you call for help?   Call your doctor now or seek immediate medical care if:    · You have unexpected vaginal bleeding.     · You have new or increased pain in your vagina or pelvis.    Watch closely for changes in your health, and be sure to contact your doctor if:    · You have a fever.     · You are not getting better after 2 days.     · Your symptoms come back after you finish your medicines. Where can you learn more? Go to http://dee-chuy.info/. Enter W608 in the search box to learn more about \"Vaginal Yeast Infection: Care Instructions. \"  Current as of: February 19, 2019  Content Version: 12.1  © 4422-9352 [employer]. Care instructions adapted under license by [employer] (which disclaims liability or warranty for this information). If you have questions about a medical condition or this instruction, always ask your healthcare professional. [address] any warranty or liability for your use of this information.

## 2019-08-27 NOTE — ED NOTES
Emergency Department Nursing Plan of Care       The Nursing Plan of Care is developed from the Nursing assessment and Emergency Department Attending provider initial evaluation. The plan of care may be reviewed in the ED Provider note.     The Plan of Care was developed with the following considerations:   Patient / Family readiness to learn indicated by:verbalized understanding  Persons(s) to be included in education: patient  Barriers to Learning/Limitations:No    Signed     Keny Rosas RN    8/27/2019   12:14 PM

## 2019-08-27 NOTE — ED NOTES
Emergency Department Nursing Plan of Care       The Nursing Plan of Care is developed from the Nursing assessment and Emergency Department Attending provider initial evaluation. The plan of care may be reviewed in the ED Provider note.     The Plan of Care was developed with the following considerations:   Patient / Family readiness to learn indicated by:verbalized understanding  Persons(s) to be included in education: patient  Barriers to Learning/Limitations:No    Signed     Shanelle Sadler RN    8/27/2019   12:45 PM

## 2019-08-27 NOTE — ED PROVIDER NOTES
EMERGENCY DEPARTMENT HISTORY AND PHYSICAL EXAM      Date: 8/27/2019  Patient Name: Madai Bardales    History of Presenting Illness     Chief Complaint   Patient presents with    Vaginal Discharge       History Provided By: Patient    HPI: Madai Bardales, 30 y.o. female significant PMHx of asthma presents ambulatory to the ED with cc increase in white vaginal discharge x 2 days with assoc pruritis x 1 day. Pt notes she is two months postpartum, and she frequently had UTIs and yeast infections in pregnancy. Pt tried monistat without relief of sx. Denies abd pain, N/V, diarrhea, fever/chills. Pt is not breast feeding. Pt denies concern for STDs, but she would like to be tested and treated. There are no other complaints, changes, or physical findings at this time. PCP: Jose Moeller MD    No current facility-administered medications on file prior to encounter. Current Outpatient Medications on File Prior to Encounter   Medication Sig Dispense Refill    levonorgestrel-ethin estradiol (LARISSIA PO) Take  by mouth.  fluticasone propionate (FLONASE) 50 mcg/actuation nasal spray 2 Sprays by Both Nostrils route daily. 1 Bottle 0    loratadine (CLARITIN) 10 mg tablet Take 1 Tab by mouth daily. 10 Tab 0    ALBUTEROL IN Take  by inhalation.          Past History     Past Medical History:  Past Medical History:   Diagnosis Date    Asthma        Past Surgical History:  Past Surgical History:   Procedure Laterality Date    HX ACL RECONSTRUCTION Left 2012    HX ORTHOPAEDIC  2012    left ACL repaired       Family History:  Family History   Problem Relation Age of Onset    No Known Problems Mother     Cancer Father     No Known Problems Sister     No Known Problems Brother     Cancer Maternal Grandmother     Cancer Maternal Grandfather     Cancer Paternal Grandmother     Cancer Paternal Grandfather        Social History:  Social History     Tobacco Use    Smoking status: Never Smoker    Smokeless tobacco: Never Used   Substance Use Topics    Alcohol use: No    Drug use: No       Allergies: Allergies   Allergen Reactions    Latex Other (comments)     Skin irritation    Amoxicillin Itching    Pcn [Penicillins] Hives    Shellfish Derived Itching         Review of Systems   Review of Systems   Constitutional: Negative for chills and fever. Respiratory: Negative for shortness of breath. Cardiovascular: Negative for chest pain. Gastrointestinal: Negative for abdominal pain, nausea and vomiting. Genitourinary: Positive for vaginal discharge. Negative for dysuria, flank pain, hematuria, vaginal bleeding and vaginal pain. Musculoskeletal: Negative for back pain and myalgias. Skin: Negative for color change, pallor, rash and wound. Neurological: Negative for dizziness, weakness and light-headedness. All other systems reviewed and are negative. Physical Exam   Physical Exam   Constitutional: She is oriented to person, place, and time. She appears well-developed and well-nourished. No distress. HENT:   Head: Normocephalic and atraumatic. Eyes: Conjunctivae are normal.   Cardiovascular: Normal rate, regular rhythm and normal heart sounds. Pulmonary/Chest: Effort normal and breath sounds normal. No respiratory distress. Abdominal: Soft. Bowel sounds are normal. She exhibits no distension. Abdomen soft, nontender   Musculoskeletal: Normal range of motion. Neurological: She is alert and oriented to person, place, and time. Skin: Skin is warm. No rash noted. Psychiatric: She has a normal mood and affect. Her behavior is normal.   Nursing note and vitals reviewed.       Diagnostic Study Results     Labs -     Recent Results (from the past 12 hour(s))   ANNETTE, OTHER SOURCES    Collection Time: 08/27/19 12:08 PM   Result Value Ref Range    Special Requests: NO SPECIAL REQUESTS      KOH NO YEAST SEEN     WET PREP    Collection Time: 08/27/19 12:08 PM   Result Value Ref Range Clue cells CLUE CELLS ABSENT      Wet prep NO TRICHOMONAS SEEN     URINALYSIS W/ REFLEX CULTURE    Collection Time: 08/27/19 12:08 PM   Result Value Ref Range    Color YELLOW/STRAW      Appearance CLOUDY (A) CLEAR      Specific gravity 1.020 1.003 - 1.030      pH (UA) 6.0 5.0 - 8.0      Protein NEGATIVE  NEG mg/dL    Glucose NEGATIVE  NEG mg/dL    Ketone NEGATIVE  NEG mg/dL    Bilirubin NEGATIVE  NEG      Blood NEGATIVE  NEG      Urobilinogen 1.0 0.2 - 1.0 EU/dL    Nitrites NEGATIVE  NEG      Leukocyte Esterase MODERATE (A) NEG      WBC 0-4 0 - 4 /hpf    RBC 0-5 0 - 5 /hpf    Epithelial cells FEW FEW /lpf    Bacteria NEGATIVE  NEG /hpf    UA:UC IF INDICATED CULTURE NOT INDICATED BY UA RESULT CNI     HCG URINE, QL. - POC    Collection Time: 08/27/19 12:12 PM   Result Value Ref Range    Pregnancy test,urine (POC) NEGATIVE  NEG         Radiologic Studies -   No orders to display     CT Results  (Last 48 hours)    None        CXR Results  (Last 48 hours)    None          Medical Decision Making   I am the first provider for this patient. I reviewed the vital signs, available nursing notes, past medical history, past surgical history, family history and social history. Vital Signs-Reviewed the patient's vital signs. Patient Vitals for the past 12 hrs:   Temp Pulse Resp BP SpO2   08/27/19 1156 98.1 °F (36.7 °C) 65 16 117/71 100 %         Records Reviewed: Nursing Notes and Old Medical Records    Provider Notes (Medical Decision Making):   DDx: Gonorrhea, Chlamydia, Trich, UTI, BV, Yeast    Pt is good candidate for vaginal self-swab instead of pelvic exam due to stable vital signs and lack of abd pain. ED Course:   Initial assessment performed. The patients presenting problems have been discussed, and they are in agreement with the care plan formulated and outlined with them. I have encouraged them to ask questions as they arise throughout their visit.          Disposition:  12:44 PM  Discussed lab results with pt along with dx and treatment plan. Discussed importance of PCP follow up. All questions answered. Pt voiced they understood. Return if sx worsen. PLAN:  1. Current Discharge Medication List      START taking these medications    Details   fluconazole (DIFLUCAN) 150 mg tablet Take 1 Tab by mouth now for 1 dose. FDA advises cautious prescribing of oral fluconazole in pregnancy. Qty: 1 Tab, Refills: 0           2. Follow-up Information     Follow up With Specialties Details Why Contact Info    daniel Buitrago MD Family Practice Schedule an appointment as soon as possible for a visit As needed [phone]          Return to ED if worse     Diagnosis     Clinical Impression:   1. Vaginal irritation        Attestations:    KAREN Santamaria    Please note that this dictation was completed with [employer], the computer voice recognition software. Quite often unanticipated grammatical, syntax, homophones, and other interpretive errors are inadvertently transcribed by the computer software. Please disregard these errors. Please excuse any errors that have escaped final proofreading. Thank you.

## 2019-11-07 ENCOUNTER — HOSPITAL ENCOUNTER (EMERGENCY)
Age: 24
Discharge: HOME | End: 2019-11-07
Attending: EMERGENCY MEDICINE
Payer: COMMERCIAL

## 2019-11-07 VITALS
SYSTOLIC BLOOD PRESSURE: 112 MMHG | WEIGHT: 126 LBS | HEART RATE: 65 BPM | HEIGHT: 68 IN | TEMPERATURE: 97.8 F | DIASTOLIC BLOOD PRESSURE: 65 MMHG | RESPIRATION RATE: 16 BRPM | BODY MASS INDEX: 19.1 KG/M2 | OXYGEN SATURATION: 100 %

## 2019-11-07 DIAGNOSIS — M77.8 TENDINITIS OF RIGHT SHOULDER: Primary | ICD-10-CM

## 2019-11-07 PROCEDURE — 74011250637 HC RX REV CODE- 250/637: Performed by: PHYSICIAN ASSISTANT

## 2019-11-07 PROCEDURE — 99283 EMERGENCY DEPT VISIT LOW MDM: CPT

## 2019-11-07 RX ORDER — CYCLOBENZAPRINE HCL 10 MG
10 TABLET ORAL
Qty: 15 TAB | Refills: 0 | Status: SHIPPED | OUTPATIENT
Start: 2019-11-07 | End: 2020-05-03

## 2019-11-07 RX ORDER — NAPROXEN 250 MG/1
500 TABLET ORAL
Status: COMPLETED | OUTPATIENT
Start: 2019-11-07 | End: 2019-11-07

## 2019-11-07 RX ORDER — NAPROXEN 500 MG/1
500 TABLET ORAL
Qty: 20 TAB | Refills: 0 | Status: SHIPPED | OUTPATIENT
Start: 2019-11-07 | End: 2020-05-03

## 2019-11-07 RX ORDER — ACETAMINOPHEN 325 MG/1
650 TABLET ORAL
Qty: 20 TAB | Refills: 0 | OUTPATIENT
Start: 2019-11-07 | End: 2019-12-17

## 2019-11-07 RX ORDER — LIDOCAINE 50 MG/G
PATCH TOPICAL
Qty: 1 EACH | Refills: 0 | Status: SHIPPED | OUTPATIENT
Start: 2019-11-07 | End: 2020-05-03

## 2019-11-07 RX ADMIN — NAPROXEN 500 MG: 250 TABLET ORAL at 15:47

## 2019-11-07 NOTE — ED NOTES
Patient (s)  given copy of dc instructions and no paper script(s) and four electronic scripts. Patient (s)  verbalized understanding of instructions and script (s). Patient given a current medication reconciliation form and verbalized understanding of their medications. Patient (s) verbalized understanding of the importance of discussing medications with  his or her physician or clinic they will be following up with. Patient alert and oriented and in no acute distress. Patient offered wheelchair from treatment area to hospital entrance, patient denies wheelchair.

## 2019-11-07 NOTE — DISCHARGE INSTRUCTIONS
Patient Education        Shoulder Pain: Care Instructions  Your Care Instructions    You can hurt your shoulder by using it too much during an activity, such as fishing or baseball. It can also happen as part of the everyday wear and tear of getting older. Shoulder injuries can be slow to heal, but your shoulder should get better with time. Your doctor may recommend a sling to rest your shoulder. If you have injured your shoulder, you may need testing and treatment. Follow-up care is a key part of your treatment and safety. Be sure to make and go to all appointments, and call your doctor if you are having problems. It's also a good idea to know your test results and keep a list of the medicines you take. How can you care for yourself at home? · Take pain medicines exactly as directed. ? If the doctor gave you a prescription medicine for pain, take it as prescribed. ? If you are not taking a prescription pain medicine, ask your doctor if you can take an over-the-counter medicine. ? Do not take two or more pain medicines at the same time unless the doctor told you to. Many pain medicines contain acetaminophen, which is Tylenol. Too much acetaminophen (Tylenol) can be harmful. · If your doctor recommends that you wear a sling, use it as directed. Do not take it off before your doctor tells you to. · Put ice or a cold pack on the sore area for 10 to 20 minutes at a time. Put a thin cloth between the ice and your skin. · If there is no swelling, you can put moist heat, a heating pad, or a warm cloth on your shoulder. Some doctors suggest alternating between hot and cold. · Rest your shoulder for a few days. If your doctor recommends it, you can then begin gentle exercise of the shoulder, but do not lift anything heavy. When should you call for help? Call 911 anytime you think you may need emergency care. For example, call if:    · You have chest pain or pressure.  This may occur with:  ? Sweating. ? Shortness of breath. ? Nausea or vomiting. ? Pain that spreads from the chest to the neck, jaw, or one or both shoulders or arms. ? Dizziness or lightheadedness. ? A fast or uneven pulse. After calling 911, chew 1 adult-strength aspirin. Wait for an ambulance. Do not try to drive yourself.     · Your arm or hand is cool or pale or changes color.    Call your doctor now or seek immediate medical care if:    · You have signs of infection, such as:  ? Increased pain, swelling, warmth, or redness in your shoulder. ? Red streaks leading from a place on your shoulder. ? Pus draining from an area of your shoulder. ? Swollen lymph nodes in your neck, armpits, or groin. ? A fever.    Watch closely for changes in your health, and be sure to contact your doctor if:    · You cannot use your shoulder.     · Your shoulder does not get better as expected. Where can you learn more? Go to http://dee-chuy.info/. Enter G967 in the search box to learn more about \"Shoulder Pain: Care Instructions. \"  Current as of: June 26, 2019  Content Version: 12.2  © 1466-3389 [employer]. Care instructions adapted under license by [employer] (which disclaims liability or warranty for this information). If you have questions about a medical condition or this instruction, always ask your healthcare professional. [place] [address] any warranty or liability for your use of this information.

## 2019-11-07 NOTE — LETTER
[facility] EMERGENCY DEPT 
[address] [address] 
[phone] Work/School Note Date: 11/7/2019 To Whom It May concern: 
 
Anastacia Lucio was seen and treated today in the emergency room by the following provider(s): 
Attending Provider: Amanda Amaro MD 
Physician Assistant: Kulwant Norton PA-C. Anastacia Lucio may return to work on 11/9/19.  
 
Sincerely, 
 
 
 
 
Oanh Burrell RN

## 2019-11-07 NOTE — ED PROVIDER NOTES
EMERGENCY DEPARTMENT HISTORY AND PHYSICAL EXAM      Date: 11/7/2019  Patient Name: Hilton Lowry    History of Presenting Illness     Chief Complaint   Patient presents with    Arm Pain     History Provided By: Patient    HPI: Hilton Lowry, right-hand-dominant 30 y.o. female with asthma who presents ambulatory to the ED with cc of subacute moderate aching right shoulder pain and right upper back pain next 2 weeks. Patient endorses increased use of right upper extremity at work. Denies any direct injury or trauma/fall. Over-the-counter Tylenol with minimal relief. No medications prior to arrival today. Pain exacerbated with movement and palpation. No nausea, vomiting, numbness, tingling, limited range of motion, focal weakness, chest pain, shortness of breath, swelling, redness, rash. PCP: Blair Dias MD    There are no other complaints, changes, or physical findings at this time. No current facility-administered medications on file prior to encounter. Current Outpatient Medications on File Prior to Encounter   Medication Sig Dispense Refill    levonorgestrel-ethin estradiol (LARISSIA PO) Take  by mouth.  fluticasone propionate (FLONASE) 50 mcg/actuation nasal spray 2 Sprays by Both Nostrils route daily. 1 Bottle 0    loratadine (CLARITIN) 10 mg tablet Take 1 Tab by mouth daily. 10 Tab 0    ALBUTEROL IN Take  by inhalation.        Past History     Past Medical History:  Past Medical History:   Diagnosis Date    Asthma      Past Surgical History:  Past Surgical History:   Procedure Laterality Date    HX ACL RECONSTRUCTION Left 2012    HX ORTHOPAEDIC  2012    left ACL repaired     Family History:  Family History   Problem Relation Age of Onset    No Known Problems Mother     Cancer Father     No Known Problems Sister     No Known Problems Brother     Cancer Maternal Grandmother     Cancer Maternal Grandfather     Cancer Paternal Grandmother     Cancer Paternal Grandfather      Social History:  Social History     Tobacco Use    Smoking status: Never Smoker    Smokeless tobacco: Never Used   Substance Use Topics    Alcohol use: No    Drug use: No     Allergies: Allergies   Allergen Reactions    Latex Other (comments)     Skin irritation    Amoxicillin Itching    Pcn [Penicillins] Hives    Shellfish Derived Itching     Review of Systems   Review of Systems   Constitutional: Negative. Negative for chills, fatigue and fever. Respiratory: Negative. Negative for cough and shortness of breath. Cardiovascular: Negative. Negative for chest pain, palpitations and leg swelling. Gastrointestinal: Negative. Negative for abdominal pain, diarrhea, nausea and vomiting. Genitourinary: Negative. Negative for difficulty urinating and dysuria. Musculoskeletal: Positive for arthralgias. Negative for back pain, joint swelling, myalgias, neck pain and neck stiffness. Skin: Negative. Negative for color change, rash and wound. Neurological: Negative. Negative for dizziness, numbness and headaches. Psychiatric/Behavioral: Negative. Physical Exam   Physical Exam   Constitutional: She is oriented to person, place, and time. She appears well-developed and well-nourished. No distress. HENT:   Head: Normocephalic and atraumatic. Right Ear: Hearing and external ear normal.   Left Ear: Hearing and external ear normal.   Nose: Nose normal.   Eyes: Pupils are equal, round, and reactive to light. Conjunctivae and EOM are normal.   Neck: Normal range of motion. Cardiovascular:   Pulses:       Radial pulses are 2+ on the right side, and 2+ on the left side. Pulmonary/Chest: Effort normal. No respiratory distress. Musculoskeletal: Normal range of motion. Right shoulder: She exhibits tenderness (minimal TTP to posterior Rt shoulder/ rotator cuff mm.) and pain.  She exhibits normal range of motion, no bony tenderness, no swelling, no effusion, no crepitus, no deformity, no laceration, no spasm, normal pulse and normal strength. Left shoulder: Normal.        Right elbow: Normal.       Cervical back: Normal.   FROM. NVI. Neurological: She is alert and oriented to person, place, and time. Skin: Skin is warm and dry. She is not diaphoretic. Psychiatric: She has a normal mood and affect. Her behavior is normal. Judgment and thought content normal.   Nursing note and vitals reviewed. Diagnostic Study Results   Labs -   No results found for this or any previous visit (from the past 12 hour(s)). Radiologic Studies -   No orders to display     No results found. Medical Decision Making   I am the first provider for this patient. I reviewed the vital signs, available nursing notes, past medical history, past surgical history, family history and social history. Vital Signs-Reviewed the patient's vital signs. Patient Vitals for the past 12 hrs:   Temp Pulse Resp BP SpO2   11/07/19 1536 97.8 °F (36.6 °C) 65 16 112/65 100 %     Pulse Oximetry Analysis - 100% on RA    Records Reviewed: Nursing Notes, Old Medical Records, Previous Radiology Studies and Previous Laboratory Studies    Provider Notes (Medical Decision Making):   Patient presents with shoulder pain. Ddx: strain, sprain, tendonitis, bursitis. No signs of fracture, dislocation, contusion. Plan to treat with NSAIDs and other analgesics prn. Follow up with Ortho prn. ED Course:   Initial assessment performed. The patients presenting problems have been discussed, and they are in agreement with the care plan formulated and outlined with them. I have encouraged them to ask questions as they arise throughout their visit. Progress Note:   Updated pt on all returned results and findings. Discussed the importance of proper follow up as referred below along with return precautions.  Pt in agreement with the care plan and expresses agreement with and understanding of all items discussed. Disposition:  3:47 PM  I have discussed with patient their diagnosis, treatment, and follow up plan. The patient agrees to follow up as outlined in discharge paperwork and also to return to the ED with any worsening. Jordana Parks PA-C      PLAN:  1. Current Discharge Medication List      START taking these medications    Details   lidocaine (LIDODERM) 5 % Apply patch to the affected area for 12 hours a day and remove for 12 hours a day. Qty: 1 Each, Refills: 0      naproxen (NAPROSYN) 500 mg tablet Take 1 Tab by mouth two (2) times daily as needed for Pain. Qty: 20 Tab, Refills: 0      acetaminophen (TYLENOL) 325 mg tablet Take 2 Tabs by mouth every four (4) hours as needed for Pain. Qty: 20 Tab, Refills: 0      cyclobenzaprine (FLEXERIL) 10 mg tablet Take 1 Tab by mouth three (3) times daily as needed for Muscle Spasm(s). Qty: 15 Tab, Refills: 0         CONTINUE these medications which have NOT CHANGED    Details   levonorgestrel-ethin estradiol (LARISSIA PO) Take  by mouth. fluticasone propionate (FLONASE) 50 mcg/actuation nasal spray 2 Sprays by Both Nostrils route daily. Qty: 1 Bottle, Refills: 0      loratadine (CLARITIN) 10 mg tablet Take 1 Tab by mouth daily. Qty: 10 Tab, Refills: 0      ALBUTEROL IN Take  by inhalation.            2.   Follow-up Information     Follow up With Specialties Details Why Contact Info    daniel Barr MD [facility] Schedule an appointment as soon as possible for a visit in 1 week As needed, If symptoms worsen [address]      [facility]  In 1 week As needed, If symptoms worsen [address]  [address]  [facility] [address]   [address]    [facility] [address]  Schedule an appointment as soon as possible for a visit in 1 week As needed, If symptoms worsen [address] [facility]    [id number] [place] [address], [facility]  Schedule an appointment as soon as possible for a visit in 1 week As needed, If symptoms worsen [id number]  [phone]        Return to ED if worse     Diagnosis     Clinical Impression:   1. Tendinitis of right shoulder            Please note that this dictation was completed with Dragon, computer voice recognition software. Quite often unanticipated grammatical, syntax, homophones, and other interpretive errors are inadvertently transcribed by the computer software. Please disregard these errors. Additionally, please excuse any errors that have escaped final proofreading.

## 2019-12-17 ENCOUNTER — HOSPITAL ENCOUNTER (EMERGENCY)
Age: 24
Discharge: HOME | End: 2019-12-17
Attending: EMERGENCY MEDICINE
Payer: COMMERCIAL

## 2019-12-17 VITALS
HEIGHT: 68 IN | OXYGEN SATURATION: 100 % | HEART RATE: 85 BPM | TEMPERATURE: 99.3 F | SYSTOLIC BLOOD PRESSURE: 120 MMHG | RESPIRATION RATE: 15 BRPM | DIASTOLIC BLOOD PRESSURE: 66 MMHG | BODY MASS INDEX: 19.51 KG/M2 | WEIGHT: 128.75 LBS

## 2019-12-17 DIAGNOSIS — J06.9 UPPER RESPIRATORY TRACT INFECTION, UNSPECIFIED TYPE: Primary | ICD-10-CM

## 2019-12-17 LAB
FLUAV AG NPH QL IA: NEGATIVE
FLUBV AG NOSE QL IA: NEGATIVE

## 2019-12-17 PROCEDURE — 87804 INFLUENZA ASSAY W/OPTIC: CPT

## 2019-12-17 PROCEDURE — 99282 EMERGENCY DEPT VISIT SF MDM: CPT

## 2019-12-17 RX ORDER — FLUTICASONE PROPIONATE 50 MCG
2 SPRAY, SUSPENSION (ML) NASAL DAILY
Qty: 1 BOTTLE | Refills: 0 | Status: SHIPPED | OUTPATIENT
Start: 2019-12-17 | End: 2020-05-03

## 2019-12-17 RX ORDER — ACETAMINOPHEN 325 MG/1
650 TABLET ORAL
Qty: 20 TAB | Refills: 0 | Status: SHIPPED | OUTPATIENT
Start: 2019-12-17 | End: 2020-05-03

## 2019-12-17 NOTE — ED NOTES
Discharge instructions given to patient by [address] [place] [address] Blvd. pt verbalized understanding of discharge instructions. Pt discharged without difficulty. Pt discharged in stable condition via ambulation, accompanied by self.

## 2019-12-17 NOTE — LETTER
[address] 
[facility] EMERGENCY DEPT 
[address] [facility] [place] [address] 
[phone] Work/School Note Date: 12/17/2019 To Whom It May concern: 
 
Chela West was seen and treated today in the emergency room by the following provider(s): 
Attending Provider: Ashly Garrett MD 
Physician Assistant: KAREN Willard. Chela West may return to work on 12/19/2019. Sincerely, 
 
 
 
 
Chicho Urrutia.  [place]andy StationAlejandrina garciama

## 2019-12-17 NOTE — DISCHARGE INSTRUCTIONS
Patient Education        Upper Respiratory Infection (Cold): Care Instructions  Your Care Instructions    An upper respiratory infection, or URI, is an infection of the nose, sinuses, or throat. URIs are spread by coughs, sneezes, and direct contact. The common cold is the most frequent kind of URI. The flu and sinus infections are other kinds of URIs. Almost all URIs are caused by viruses. Antibiotics won't cure them. But you can treat most infections with home care. This may include drinking lots of fluids and taking over-the-counter pain medicine. You will probably feel better in 4 to 10 days. The doctor has checked you carefully, but problems can develop later. If you notice any problems or new symptoms, get medical treatment right away. Follow-up care is a key part of your treatment and safety. Be sure to make and go to all appointments, and call your doctor if you are having problems. It's also a good idea to know your test results and keep a list of the medicines you take. How can you care for yourself at home? · To prevent dehydration, drink plenty of fluids, enough so that your urine is light yellow or clear like water. Choose water and other caffeine-free clear liquids until you feel better. If you have kidney, heart, or liver disease and have to limit fluids, talk with your doctor before you increase the amount of fluids you drink. · Take an over-the-counter pain medicine, such as acetaminophen (Tylenol), ibuprofen (Advil, Motrin), or naproxen (Aleve). Read and follow all instructions on the label. · Before you use cough and cold medicines, check the label. These medicines may not be safe for young children or for people with certain health problems. · Be careful when taking over-the-counter cold or flu medicines and Tylenol at the same time. Many of these medicines have acetaminophen, which is Tylenol. Read the labels to make sure that you are not taking more than the recommended dose.  Too much acetaminophen (Tylenol) can be harmful. · Get plenty of rest.  · Do not smoke or allow others to smoke around you. If you need help quitting, talk to your doctor about stop-smoking programs and medicines. These can increase your chances of quitting for good. When should you call for help? Call 911 anytime you think you may need emergency care. For example, call if:    · You have severe trouble breathing.    Call your doctor now or seek immediate medical care if:    · You seem to be getting much sicker.     · You have new or worse trouble breathing.     · You have a new or higher fever.     · You have a new rash.    Watch closely for changes in your health, and be sure to contact your doctor if:    · You have a new symptom, such as a sore throat, an earache, or sinus pain.     · You cough more deeply or more often, especially if you notice more mucus or a change in the color of your mucus.     · You do not get better as expected. Where can you learn more? Go to http://dee-chuy.info/. Enter L476 in the search box to learn more about \"Upper Respiratory Infection (Cold): Care Instructions. \"  Current as of: June 9, 2019  Content Version: 12.2  © 8024-9900 [employer]. Care instructions adapted under license by [employer] (which disclaims liability or warranty for this information). If you have questions about a medical condition or this instruction, always ask your healthcare professional. [place] [address] any warranty or liability for your use of this information.

## 2019-12-17 NOTE — ED PROVIDER NOTES
EMERGENCY DEPARTMENT HISTORY AND PHYSICAL EXAM      Date: 12/17/2019  Patient Name: Mere Simmons    Please note that this dictation was completed with [employer], the computer voice recognition software. Quite often unanticipated grammatical, syntax, homophones, and other interpretive errors are inadvertently transcribed by the computer software. Please disregard these errors. Please excuse any errors that have escaped final proofreading. History of Presenting Illness     Chief Complaint   Patient presents with    Flu Like Symptoms     Pt reports genralized body aches, Chest congestion and fever sicne last night. took tylenol last night       History Provided By: Patient    HPI: Mere Simmons, 30 y.o. female with PMHx significant for asthma, presents ambulatory to the ED with cc of rhinorrhea, congestion, fever, and generalized body aches that began last night. Pt has been exposed to someone who has been sick at work. She received her flu vaccine this year. She took tylenol last night, but no medication PTA today. She denies any cough, sore throat, HA, abdominal pain, N/V.      PCP: Georgi Bhagat MD    There are no other complaints, changes, or physical findings at this time. Current Outpatient Medications   Medication Sig Dispense Refill    fluticasone propionate (FLONASE) 50 mcg/actuation nasal spray 2 Sprays by Both Nostrils route daily. 1 Bottle 0    acetaminophen (TYLENOL) 325 mg tablet Take 2 Tabs by mouth every four (4) hours as needed for Pain. 20 Tab 0    dextromethorphan-guaiFENesin (ROBITUSSIN COUGH-CHEST MLEISSA DM) 5-100 mg/5 mL liqd Take 10 mL by mouth every six (6) hours. 118 mL 0    lidocaine (LIDODERM) 5 % Apply patch to the affected area for 12 hours a day and remove for 12 hours a day. 1 Each 0    naproxen (NAPROSYN) 500 mg tablet Take 1 Tab by mouth two (2) times daily as needed for Pain.  20 Tab 0    cyclobenzaprine (FLEXERIL) 10 mg tablet Take 1 Tab by mouth three (3) times daily as needed for Muscle Spasm(s). 15 Tab 0    levonorgestrel-ethin estradiol (LARISSIA PO) Take  by mouth.  loratadine (CLARITIN) 10 mg tablet Take 1 Tab by mouth daily. 10 Tab 0    ALBUTEROL IN Take  by inhalation. Past History     Past Medical History:  Past Medical History:   Diagnosis Date    Asthma        Past Surgical History:  Past Surgical History:   Procedure Laterality Date    HX ACL RECONSTRUCTION Left 2012    HX ORTHOPAEDIC  2012    left ACL repaired       Family History:  Family History   Problem Relation Age of Onset    No Known Problems Mother     Cancer Father     No Known Problems Sister     No Known Problems Brother     Cancer Maternal Grandmother     Cancer Maternal Grandfather     Cancer Paternal Grandmother     Cancer Paternal Grandfather        Social History:  Social History     Tobacco Use    Smoking status: Never Smoker    Smokeless tobacco: Never Used   Substance Use Topics    Alcohol use: No    Drug use: No       Allergies: Allergies   Allergen Reactions    Latex Other (comments)     Skin irritation    Amoxicillin Itching    Pcn [Penicillins] Hives    Shellfish Derived Itching         Review of Systems   Review of Systems   Constitutional: Positive for fever. Negative for chills. HENT: Positive for congestion and rhinorrhea. Negative for sinus pressure, sneezing and sore throat. Eyes: Negative for pain and visual disturbance. Respiratory: Negative for cough and shortness of breath. Cardiovascular: Negative for chest pain and palpitations. Gastrointestinal: Negative for abdominal pain, nausea and vomiting. Genitourinary: Negative for dysuria and hematuria. Musculoskeletal: Negative for arthralgias and myalgias. Skin: Negative for color change, rash and wound. Neurological: Negative for numbness and headaches. All other systems reviewed and are negative.       Physical Exam   Physical Exam  Vitals signs and nursing note reviewed. Constitutional:       General: She is not in acute distress. Appearance: She is well-developed. She is not diaphoretic. Comments: 30 y.o. female in NAD  Communicates appropriately and in full sentences  Normal vital signs   HENT:      Head: Normocephalic and atraumatic. Comments: Nonerythematous posterior pharynx  B/L tonsils are not enlarged and without exudates  No airway compromise  Boggy nasal mucosa  Moist mucous membranes  Non-tender anterior cervical lymph nodes       Right Ear: Tympanic membrane, ear canal and external ear normal. There is no impacted cerumen. Left Ear: Tympanic membrane, ear canal and external ear normal. There is no impacted cerumen. Nose: Congestion and rhinorrhea present. Mouth/Throat:      Mouth: Mucous membranes are moist.   Eyes:      General:         Right eye: No discharge. Left eye: No discharge. Conjunctiva/sclera: Conjunctivae normal.      Pupils: Pupils are equal, round, and reactive to light. Neck:      Musculoskeletal: Normal range of motion and neck supple. Comments: No nuchal rigidity  Cardiovascular:      Rate and Rhythm: Normal rate and regular rhythm. Pulmonary:      Effort: Pulmonary effort is normal. No respiratory distress. Breath sounds: Normal breath sounds. No wheezing. Abdominal:      General: There is no distension. Palpations: Abdomen is soft. Tenderness: There is no tenderness. Musculoskeletal: Normal range of motion. General: No tenderness or deformity. Comments: No neurologic or vascular compromise on exam.    Skin:     General: Skin is warm and dry. Coloration: Skin is not pale. Findings: No erythema or rash. Neurological:      Mental Status: She is alert and oriented to person, place, and time.       Coordination: Coordination normal.           Diagnostic Study Results     Labs -     Recent Results (from the past 12 hour(s))   INFLUENZA A & B AG (RAPID TEST)    Collection Time: 12/17/19  4:33 PM   Result Value Ref Range    Influenza A Antigen NEGATIVE  NEG      Influenza B Antigen NEGATIVE  NEG         Medical Decision Making   I am the first provider for this patient. I reviewed the vital signs, available nursing notes, past medical history, past surgical history, family history and social history. Vital Signs-Reviewed the patient's vital signs. Patient Vitals for the past 12 hrs:   Temp Pulse Resp BP SpO2   12/17/19 1629 99.3 °F (37.4 °C) 85 15 120/66 100 %         Records Reviewed: Nursing Notes and Old Medical Records    Provider Notes (Medical Decision Making):   DDx: bacterial sinusitis vs. pharyngitis, migraine, flu, viral illness, rhinovirus, URI, bronchitis     The patient complains of nasal congestion, rhinorrhea, and sore throat. Denies dyspnea or wheezing. Symptoms consistent with an uncomplicated viral URI.    Pt is provided with education, including that of supportive care, proper hydration, handwashing, and avoidance of sick contacts. Symptomatic therapy suggested: ibuprofen (every 6-8 hours) and tylenol (every 4-6 hrs), antihistamine-decongestant of choice, cough suppressant of choice. Increase fluids, use vaporizer, stay in steamy bathroom tid 15 min prn severe cough, tylenol as needed, rest, avoid smoky areas. Lack of antibiotic effectiveness discussed with pt. Follow up prn if not better in 72 hours or return to ED for acute worsening of symptoms. ED Course:   Initial assessment performed. The patients presenting problems have been discussed, and they are in agreement with the care plan formulated and outlined with them. I have encouraged them to ask questions as they arise throughout their visit. DISCHARGE NOTE:  Georgiajv Nancie's  results have been reviewed with her. She has been counseled regarding her diagnosis.   She verbally conveys understanding and agreement of the signs, symptoms, diagnosis, treatment and prognosis and additionally agrees to follow up as recommended with Dr. Duane Muscat, MD in 24 - 48 hours. She also agrees with the care-plan and conveys that all of her questions have been answered. I have also put together some discharge instructions for her that include: 1) educational information regarding their diagnosis, 2) how to care for their diagnosis at home, as well a 3) list of reasons why they would want to return to the ED prior to their follow-up appointment, should their condition change. She and/or family's questions have been answered. I have encouraged them to see the official results in [facility]\" or to retrieve the specifics of their results from medical records. PLAN:  1. Return precautions as discussed  2. Follow-up with providers as directed  3. Medications as prescribed    Return to ED if worse     Diagnosis     Clinical Impression:   1. Upper respiratory tract infection, unspecified type        Discharge Medication List as of 12/17/2019  5:46 PM      START taking these medications    Details   dextromethorphan-guaiFENesin (ROBITUSSIN COUGH-CHEST MELISSA DM) 5-100 mg/5 mL liqd Take 10 mL by mouth every six (6) hours. , Normal, Disp-118 mL, R-0         CONTINUE these medications which have CHANGED    Details   fluticasone propionate (FLONASE) 50 mcg/actuation nasal spray 2 Sprays by Both Nostrils route daily. , Normal, Disp-1 Bottle, R-0      acetaminophen (TYLENOL) 325 mg tablet Take 2 Tabs by mouth every four (4) hours as needed for Pain., Normal, Disp-20 Tab, R-0         CONTINUE these medications which have NOT CHANGED    Details   lidocaine (LIDODERM) 5 % Apply patch to the affected area for 12 hours a day and remove for 12 hours a day., Normal, Disp-1 Each, R-0      naproxen (NAPROSYN) 500 mg tablet Take 1 Tab by mouth two (2) times daily as needed for Pain., Normal, Disp-20 Tab, R-0      cyclobenzaprine (FLEXERIL) 10 mg tablet Take 1 Tab by mouth three (3) times daily as needed for Muscle Spasm(s). , Normal, Disp-15 Tab, R-0      levonorgestrel-ethin estradiol (LARISSIA PO) Take  by mouth., Historical Med      loratadine (CLARITIN) 10 mg tablet Take 1 Tab by mouth daily. , Print, Disp-10 Tab, R-0      ALBUTEROL IN Take  by inhalation. , Historical Med             Follow-up Information     Follow up With Specialties Details Why Contact Info    daniel Gunderson MD Family Practice Schedule an appointment as soon as possible for a visit in 2 days As needed, If symptoms worsen [phone]  [phone]      [facility] EMERGENCY DEPT Emergency Medicine Go to If symptoms worsen [address] [place] [address]  [phone]              This note will not be viewable in [address]

## 2020-05-03 ENCOUNTER — HOSPITAL ENCOUNTER (EMERGENCY)
Age: 25
Discharge: HOME | End: 2020-05-03
Attending: EMERGENCY MEDICINE
Payer: COMMERCIAL

## 2020-05-03 VITALS
BODY MASS INDEX: 18.94 KG/M2 | RESPIRATION RATE: 16 BRPM | TEMPERATURE: 98.5 F | DIASTOLIC BLOOD PRESSURE: 62 MMHG | WEIGHT: 125 LBS | HEIGHT: 68 IN | OXYGEN SATURATION: 100 % | HEART RATE: 59 BPM | SYSTOLIC BLOOD PRESSURE: 120 MMHG

## 2020-05-03 DIAGNOSIS — L03.90 CELLULITIS, UNSPECIFIED CELLULITIS SITE: Primary | ICD-10-CM

## 2020-05-03 DIAGNOSIS — N64.4 BREAST PAIN, LEFT: ICD-10-CM

## 2020-05-03 PROCEDURE — 99283 EMERGENCY DEPT VISIT LOW MDM: CPT

## 2020-05-03 PROCEDURE — 74011250637 HC RX REV CODE- 250/637: Performed by: EMERGENCY MEDICINE

## 2020-05-03 RX ORDER — CEPHALEXIN 250 MG/1
500 CAPSULE ORAL
Status: COMPLETED | OUTPATIENT
Start: 2020-05-03 | End: 2020-05-03

## 2020-05-03 RX ORDER — CEPHALEXIN 500 MG/1
500 CAPSULE ORAL 4 TIMES DAILY
Qty: 28 CAP | Refills: 0 | Status: SHIPPED | OUTPATIENT
Start: 2020-05-03 | End: 2020-05-10

## 2020-05-03 RX ORDER — KETOROLAC TROMETHAMINE 10 MG/1
10 TABLET, FILM COATED ORAL
Qty: 20 TAB | Refills: 0 | Status: SHIPPED | OUTPATIENT
Start: 2020-05-03 | End: 2020-05-08

## 2020-05-03 RX ADMIN — CEPHALEXIN 500 MG: 250 CAPSULE ORAL at 23:50

## 2020-05-04 NOTE — ED NOTES
Pt given discharge instructions. Discharged ambulatory with steady gait. No acute distress at time of discharge.

## 2020-05-04 NOTE — ED PROVIDER NOTES
EMERGENCY DEPARTMENT HISTORY AND PHYSICAL EXAM      Date: 5/3/2020  Patient Name: Walter Jiame    History of Presenting Illness     Chief Complaint   Patient presents with    Skin Problem     Patient reports she has an abcess on her L nipple she was seen 2 days ago but it has got bigger. History Provided By: Patient    HPI: Walter Jaime, 30 y.o. female  Without significant past medical history presenting today with left-sided breast pain. The patient reports that she has been having couple of days of erythema and swelling to the left breast near the nipple. She denies any discharge, no fevers, chills, or any other symptoms at this time. The patient does have a 8month-old child, but does not breast-feed at this time. There are no other complaints, changes, or physical findings at this time. PCP: Raj Forbes MD    No current facility-administered medications on file prior to encounter. Current Outpatient Medications on File Prior to Encounter   Medication Sig Dispense Refill    levonorgestrel-ethin estradiol (LARISSIA PO) Take  by mouth.  ALBUTEROL IN Take  by inhalation. Past History     Past Medical History:  Past Medical History:   Diagnosis Date    Asthma        Past Surgical History:  Past Surgical History:   Procedure Laterality Date    HX ACL RECONSTRUCTION Left 2012    HX ORTHOPAEDIC  2012    left ACL repaired       Family History:  Family History   Problem Relation Age of Onset    No Known Problems Mother     Cancer Father     No Known Problems Sister     No Known Problems Brother     Cancer Maternal Grandmother     Cancer Maternal Grandfather     Cancer Paternal Grandmother     Cancer Paternal Grandfather        Social History:  Social History     Tobacco Use    Smoking status: Never Smoker    Smokeless tobacco: Never Used   Substance Use Topics    Alcohol use: No    Drug use: No       Allergies:   Allergies   Allergen Reactions    Latex Other (comments)     Skin irritation    Amoxicillin Itching    Pcn [Penicillins] Hives    Shellfish Derived Itching         Review of Systems   Constitutional: No  fever  Skin: No  rash  HEENT: No  nasal congestion  Resp: No cough  CV: No chest pain  GI: No vomiting    Physical Exam     Patient Vitals for the past 12 hrs:   Temp Pulse Resp BP SpO2   05/03/20 2208 98.5 °F (36.9 °C) (!) 59 16 120/62 100 %       General: alert, No acute distress  Eyes: EOMI, normal conjunctiva  ENT: moist mucous membranes. Neck: Active, full ROM of neck. Skin: No rashes. no jaundice left breast with 2 cm x 2 similar area of erythema, induration, no fluctuance             Lungs: Equal chest expansion. no respiratory distress. Heart: regular rate     no peripheral edema    Abd:  non distended soft  Back: Full ROM  MSK: Full, active ROM in all 4 extremities. Neuro: alert  Person, Place, Time and Situation; normal speech;   Psych: Cooperative with exam; Appropriate mood and affect             Diagnostic Study Results     Labs -   No results found for this or any previous visit (from the past 12 hour(s)). Radiologic Studies -   No orders to display     CT Results  (Last 48 hours)    None        CXR Results  (Last 48 hours)    None          Medical Decision Making   I am the first provider for this patient. I reviewed the vital signs, available nursing notes, past medical history, past surgical history, family history and social history. Provider Notes (Medical Decision Making):     Differential Diagnosis: Cellulitis, abscess, inflammatory condition    Initial Plan: Patient presents today with left-sided breast pain. She has an area of induration but no fluctuance. I doubt abscess, will treat with p.o. antibiotics.   We discussed that if this does not resolve the symptoms that she should follow-up with her OB/GYN as this could be representative of inflammatory carcinoma, though I feel this is less likely given the patient's young age, and findings more consistent with infection. Monica Pisano MD, am the attending of record for this patient encounter. Dispo: Discharged. The patient has been re-evaluated and is ready for discharge. Reviewed available results with patient. Counseled patient on diagnosis and care plan. Patient has expressed understanding, and all questions have been answered. Patient agrees with plan and agrees to follow up as recommended, or to return to the ED if their symptoms worsen. Discharge instructions have been provided and explained to the patient, along with reasons to return to the ED. PLAN:  Discharge Medication List as of 5/3/2020 11:39 PM      START taking these medications    Details   cephALEXin (Keflex) 500 mg capsule Take 1 Cap by mouth four (4) times daily for 7 days. , Normal, Disp-28 Cap, R-0         CONTINUE these medications which have NOT CHANGED    Details   levonorgestrel-ethin estradiol (LARISSIA PO) Take  by mouth., Historical Med      ALBUTEROL IN Take  by inhalation. , Historical Med         1.   2.     Follow-up Information     Follow up With Specialties Details Why Contact Info    daniel Moeller MD Family Practice  As needed [phone]          3. Return to ED if worse       Diagnosis     Clinical Impression:   1. Cellulitis, unspecified cellulitis site    2. Breast pain, left        Attestations:    Liset Quintana MD    Please note that this dictation was completed with [employer], the computer voice recognition software. Quite often unanticipated grammatical, syntax, homophones, and other interpretive errors are inadvertently transcribed by the computer software. Please disregard these errors. Please excuse any errors that have escaped final proofreading. Thank you.

## 2020-12-14 ENCOUNTER — HOSPITAL ENCOUNTER (EMERGENCY)
Age: 25
Discharge: HOME | End: 2020-12-14
Attending: EMERGENCY MEDICINE | Admitting: EMERGENCY MEDICINE
Payer: COMMERCIAL

## 2020-12-14 VITALS
BODY MASS INDEX: 17.88 KG/M2 | DIASTOLIC BLOOD PRESSURE: 64 MMHG | SYSTOLIC BLOOD PRESSURE: 112 MMHG | HEIGHT: 68 IN | WEIGHT: 117.95 LBS | TEMPERATURE: 97.2 F | OXYGEN SATURATION: 100 % | RESPIRATION RATE: 16 BRPM | HEART RATE: 74 BPM

## 2020-12-14 DIAGNOSIS — R11.0 NAUSEA WITHOUT VOMITING: ICD-10-CM

## 2020-12-14 DIAGNOSIS — R10.9 ACUTE ABDOMINAL PAIN: Primary | ICD-10-CM

## 2020-12-14 DIAGNOSIS — R19.7 DIARRHEA, UNSPECIFIED TYPE: ICD-10-CM

## 2020-12-14 DIAGNOSIS — A05.9 FOODBORNE GASTROENTERITIS: ICD-10-CM

## 2020-12-14 LAB
APPEARANCE UR: CLEAR
BACTERIA URNS QL MICRO: NEGATIVE /HPF
BILIRUB UR QL: NEGATIVE
COLOR UR: ABNORMAL
EPITH CASTS URNS QL MICRO: ABNORMAL /LPF
GLUCOSE UR STRIP.AUTO-MCNC: NEGATIVE MG/DL
HCG UR QL: NEGATIVE
HGB UR QL STRIP: NEGATIVE
KETONES UR QL STRIP.AUTO: NEGATIVE MG/DL
LEUKOCYTE ESTERASE UR QL STRIP.AUTO: ABNORMAL
NITRITE UR QL STRIP.AUTO: NEGATIVE
PH UR STRIP: 6 [PH] (ref 5–8)
PROT UR STRIP-MCNC: NEGATIVE MG/DL
RBC #/AREA URNS HPF: ABNORMAL /HPF (ref 0–5)
SP GR UR REFRACTOMETRY: 1.02 (ref 1–1.03)
UA: UC IF INDICATED,UAUC: ABNORMAL
UROBILINOGEN UR QL STRIP.AUTO: 0.2 EU/DL (ref 0.2–1)
WBC URNS QL MICRO: ABNORMAL /HPF (ref 0–4)

## 2020-12-14 PROCEDURE — 81025 URINE PREGNANCY TEST: CPT

## 2020-12-14 PROCEDURE — 74011250637 HC RX REV CODE- 250/637: Performed by: EMERGENCY MEDICINE

## 2020-12-14 PROCEDURE — 99284 EMERGENCY DEPT VISIT MOD MDM: CPT

## 2020-12-14 PROCEDURE — 81001 URINALYSIS AUTO W/SCOPE: CPT

## 2020-12-14 RX ORDER — ALUMINA, MAGNESIA, AND SIMETHICONE 2400; 2400; 240 MG/30ML; MG/30ML; MG/30ML
10 SUSPENSION ORAL
Qty: 335 ML | Refills: 0 | Status: SHIPPED | OUTPATIENT
Start: 2020-12-14 | End: 2020-12-29

## 2020-12-14 RX ORDER — DICYCLOMINE HYDROCHLORIDE 10 MG/1
10 CAPSULE ORAL
Status: COMPLETED | OUTPATIENT
Start: 2020-12-14 | End: 2020-12-14

## 2020-12-14 RX ORDER — LOPERAMIDE HYDROCHLORIDE 2 MG/1
2 CAPSULE ORAL
Status: COMPLETED | OUTPATIENT
Start: 2020-12-14 | End: 2020-12-14

## 2020-12-14 RX ORDER — DICYCLOMINE HYDROCHLORIDE 10 MG/1
10 CAPSULE ORAL 3 TIMES DAILY
Qty: 20 CAP | Refills: 0 | Status: SHIPPED | OUTPATIENT
Start: 2020-12-14 | End: 2020-12-29

## 2020-12-14 RX ORDER — ONDANSETRON 4 MG/1
4 TABLET, ORALLY DISINTEGRATING ORAL
Qty: 20 TAB | Refills: 0 | Status: SHIPPED | OUTPATIENT
Start: 2020-12-14 | End: 2020-12-29

## 2020-12-14 RX ORDER — LOPERAMIDE HYDROCHLORIDE 2 MG/1
2 CAPSULE ORAL
Qty: 20 CAP | Refills: 0 | Status: SHIPPED | OUTPATIENT
Start: 2020-12-14 | End: 2020-12-24

## 2020-12-14 RX ADMIN — LOPERAMIDE HYDROCHLORIDE 2 MG: 2 CAPSULE ORAL at 17:55

## 2020-12-14 RX ADMIN — DICYCLOMINE HYDROCHLORIDE 10 MG: 10 CAPSULE ORAL at 17:55

## 2020-12-14 NOTE — ED NOTES
Patient did not take insulin last night or this morning. PIC glucose 251. Denies symptoms. Will observe and recheck after procedure.   Pt presents to ED ambulatory complaining of abdominal pain after eating something \"bad\" from [facility]. Pt is alert and oriented x 4, RR even and unlabored, skin is warm and dry. Assessment completed and pt updated on plan of care. Call bell in reach. Emergency Department Nursing Plan of Care       The Nursing Plan of Care is developed from the Nursing assessment and Emergency Department Attending provider initial evaluation. The plan of care may be reviewed in the ED Provider note.     The Plan of Care was developed with the following considerations:   Patient / Family readiness to learn indicated by:verbalized understanding  Persons(s) to be included in education: patient  Barriers to Learning/Limitations:No    Signed     Allan Villanueva    12/14/2020   5:58 PM

## 2020-12-14 NOTE — ED NOTES
Discharge instructions were given to the patient by Crystal Chowdhury RN. The patient left the Emergency Department ambulatory, alert and oriented and in no acute distress with 4 prescriptions. The patient was encouraged to call or return to the ED for worsening issues or problems and was encouraged to schedule a follow up appointment for continuing care. The patient verbalized understanding of discharge instructions and prescriptions, all questions were answered. The patient has no further concerns at this time.

## 2020-12-14 NOTE — DISCHARGE INSTRUCTIONS
Patient Education        Abdominal Pain: Care Instructions  Your Care Instructions     Abdominal pain has many possible causes. Some aren't serious and get better on their own in a few days. Others need more testing and treatment. If your pain continues or gets worse, you need to be rechecked and may need more tests to find out what is wrong. You may need surgery to correct the problem. Don't ignore new symptoms, such as fever, nausea and vomiting, urination problems, pain that gets worse, and dizziness. These may be signs of a more serious problem. Your doctor may have recommended a follow-up visit in the next 8 to 12 hours. If you are not getting better, you may need more tests or treatment. The doctor has checked you carefully, but problems can develop later. If you notice any problems or new symptoms, get medical treatment right away. Follow-up care is a key part of your treatment and safety. Be sure to make and go to all appointments, and call your doctor if you are having problems. It's also a good idea to know your test results and keep a list of the medicines you take. How can you care for yourself at home? · Rest until you feel better. · To prevent dehydration, drink plenty of fluids, enough so that your urine is light yellow or clear like water. Choose water and other caffeine-free clear liquids until you feel better. If you have kidney, heart, or liver disease and have to limit fluids, talk with your doctor before you increase the amount of fluids you drink. · If your stomach is upset, eat mild foods, such as rice, dry toast or crackers, bananas, and applesauce. Try eating several small meals instead of two or three large ones. · Wait until 48 hours after all symptoms have gone away before you have spicy foods, alcohol, and drinks that contain caffeine. · Do not eat foods that are high in fat. · Avoid anti-inflammatory medicines such as aspirin, ibuprofen (Advil, Motrin), and naproxen (Aleve). These can cause stomach upset. Talk to your doctor if you take daily aspirin for another health problem. When should you call for help? Call 911 anytime you think you may need emergency care. For example, call if:    · You passed out (lost consciousness).     · You pass maroon or very bloody stools.     · You vomit blood or what looks like coffee grounds.     · You have new, severe belly pain. Call your doctor now or seek immediate medical care if:    · Your pain gets worse, especially if it becomes focused in one area of your belly.     · You have a new or higher fever.     · Your stools are black and look like tar, or they have streaks of blood.     · You have unexpected vaginal bleeding.     · You have symptoms of a urinary tract infection. These may include:  ? Pain when you urinate. ? Urinating more often than usual.  ? Blood in your urine.     · You are dizzy or lightheaded, or you feel like you may faint. Watch closely for changes in your health, and be sure to contact your doctor if:    · You are not getting better after 1 day (24 hours). Where can you learn more? Go to http://www.gray.com/  Enter J026 in the search box to learn more about \"Abdominal Pain: Care Instructions. \"  Current as of: June 26, 2019               Content Version: 12.6  © 3796-0435 [employer]. Care instructions adapted under license by [employer] (which disclaims liability or warranty for this information). If you have questions about a medical condition or this instruction, always ask your healthcare professional. [place] [address] any warranty or liability for your use of this information. Patient Education        Diarrhea: Care Instructions  Your Care Instructions     Diarrhea is loose, watery stools (bowel movements). The exact cause is often hard to find. Sometimes diarrhea is your body's way of getting rid of what caused an upset stomach. Viruses, food poisoning, and many medicines can cause diarrhea. Some people get diarrhea in response to emotional stress, anxiety, or certain foods. Almost everyone has diarrhea now and then. It usually isn't serious, and your stools will return to normal soon. The important thing to do is replace the fluids you have lost, so you can prevent dehydration. The doctor has checked you carefully, but problems can develop later. If you notice any problems or new symptoms, get medical treatment right away. Follow-up care is a key part of your treatment and safety. Be sure to make and go to all appointments, and call your doctor if you are having problems. It's also a good idea to know your test results and keep a list of the medicines you take. How can you care for yourself at home? · Watch for signs of dehydration, which means your body has lost too much water. Dehydration is a serious condition and should be treated right away. Signs of dehydration are:  ? Increasing thirst and dry eyes and mouth. ? Feeling faint or lightheaded. ? A smaller amount of urine than normal.  · To prevent dehydration, drink plenty of fluids. Choose water and other caffeine-free clear liquids until you feel better. If you have kidney, heart, or liver disease and have to limit fluids, talk with your doctor before you increase the amount of fluids you drink. · Begin eating small amounts of mild foods the next day, if you feel like it. ? Try yogurt that has live cultures of Lactobacillus. (Check the label.)  ? Avoid spicy foods, fruits, alcohol, and caffeine until 48 hours after all symptoms are gone. ? Avoid chewing gum that contains sorbitol. ? Avoid dairy products (except for yogurt with Lactobacillus) while you have diarrhea and for 3 days after symptoms are gone. · The doctor may recommend that you take over-the-counter medicine, such as loperamide (Imodium), if you still have diarrhea after 6 hours.  Read and follow all instructions on the label. Do not use this medicine if you have bloody diarrhea, a high fever, or other signs of serious illness. Call your doctor if you think you are having a problem with your medicine. When should you call for help? Call 911 anytime you think you may need emergency care. For example, call if:    · You passed out (lost consciousness).     · Your stools are maroon or very bloody. Call your doctor now or seek immediate medical care if:    · You are dizzy or lightheaded, or you feel like you may faint.     · Your stools are black and look like tar, or they have streaks of blood.     · You have new or worse belly pain.     · You have symptoms of dehydration, such as:  ? Dry eyes and a dry mouth. ? Passing only a little dark urine. ? Feeling thirstier than usual.     · You have a new or higher fever. Watch closely for changes in your health, and be sure to contact your doctor if:    · Your diarrhea is getting worse.     · You see pus in the diarrhea.     · You are not getting better after 2 days (48 hours). Where can you learn more? Go to http://www.gray.com/  Enter [id number] in the search box to learn more about \"Diarrhea: Care Instructions. \"  Current as of: June 26, 2019               Content Version: 12.6  © 9315-8956 [employer]. Care instructions adapted under license by [employer] (which disclaims liability or warranty for this information). If you have questions about a medical condition or this instruction, always ask your healthcare professional. [place] [address] any warranty or liability for your use of this information.

## 2020-12-15 NOTE — ED PROVIDER NOTES
EMERGENCY DEPARTMENT HISTORY AND PHYSICAL EXAM      Please note that this dictation was completed with the assistance of \"Dragon\", the computer voice recognition software. Quite often unanticipated grammatical, syntax, homophones, and other interpretive errors are inadvertently transcribed by the computer software. Please disregard these errors and any errors that have escaped final proofreading. Thank you. Patient Name: Kaden Pretty  : 1995  MRN: [id number]  History of Presenting Illness     Chief Complaint   Patient presents with    Abdominal Pain     Denies N/V/D, just abd pain x 2 days       History Provided By: Patient    HPI: Kaden Pretty, 30 y.o. female with past medical history as documented below presents to the ED with c/o of two days of mild generalized abd discomfort with associated diarrhea and nausea after eating a \"bad sandwich\" from [facility] gas station. She states sx's started shortly after eating the sandwich. She reports no bloody in stool. She reports pain is mild, worse when having a bowel movement. She denies chance of pregnancy. She has taken no medications PTA. Pt denies any other alleviating or exacerbating factors. Additionally, pt specifically denies any recent fever, chills, headache,  CP, SOB, lightheadedness, dizziness, numbness, weakness, lower extremity swelling, heart palpitations, urinary sxs, constipation, melena, hematochezia, cough, or congestion. There are no other complaints, changes or physical findings pertinent to the HPI at this time.     PCP: Damien Goins MD    Past History   Past Medical History:  Past Medical History:   Diagnosis Date    Asthma        Past Surgical History:  Past Surgical History:   Procedure Laterality Date    HX ACL RECONSTRUCTION Left 2012    HX ORTHOPAEDIC  2012    left ACL repaired       Family History:  Family History   Problem Relation Age of Onset    No Known Problems Mother     Cancer Father     No Known Problems Sister     No Known Problems Brother     Cancer Maternal Grandmother     Cancer Maternal Grandfather     Cancer Paternal Grandmother     Cancer Paternal Grandfather        Social History:  Social History     Tobacco Use    Smoking status: Never Smoker    Smokeless tobacco: Never Used   Substance Use Topics    Alcohol use: No    Drug use: No       Allergies: Allergies   Allergen Reactions    Latex Other (comments)     Skin irritation    Amoxicillin Itching    Pcn [Penicillins] Hives    Shellfish Derived Itching       Current Medications:  No current facility-administered medications on file prior to encounter. Current Outpatient Medications on File Prior to Encounter   Medication Sig Dispense Refill    levonorgestrel-ethin estradiol (LARISSIA PO) Take  by mouth.  ALBUTEROL IN Take  by inhalation. Review of Systems   Review of Systems   Constitutional: Negative. Negative for chills and fever. HENT: Negative. Negative for congestion, facial swelling, rhinorrhea, sore throat, trouble swallowing and voice change. Eyes: Negative. Respiratory: Negative. Negative for apnea, cough, chest tightness, shortness of breath and wheezing. Cardiovascular: Negative. Negative for chest pain, palpitations and leg swelling. Gastrointestinal: Positive for abdominal pain, diarrhea and nausea. Negative for abdominal distention, blood in stool, constipation and vomiting. Endocrine: Negative. Negative for cold intolerance, heat intolerance and polyuria. Genitourinary: Negative. Negative for difficulty urinating, dysuria, flank pain, frequency, hematuria and urgency. Musculoskeletal: Negative. Negative for arthralgias, back pain, myalgias, neck pain and neck stiffness. Skin: Negative. Negative for color change and rash. Neurological: Negative. Negative for dizziness, syncope, facial asymmetry, speech difficulty, weakness, light-headedness, numbness and headaches. Hematological: Negative. Does not bruise/bleed easily. Psychiatric/Behavioral: Negative. Negative for confusion and self-injury. The patient is not nervous/anxious. Physical Exam   Physical Exam  Vitals signs and nursing note reviewed. Constitutional:       General: She is not in acute distress. Appearance: She is well-developed. She is not diaphoretic. HENT:      Head: Normocephalic and atraumatic. Mouth/Throat:      Pharynx: No oropharyngeal exudate. Eyes:      Conjunctiva/sclera: Conjunctivae normal.      Pupils: Pupils are equal, round, and reactive to light. Neck:      Musculoskeletal: Normal range of motion. Cardiovascular:      Rate and Rhythm: Normal rate and regular rhythm. Heart sounds: Normal heart sounds. No murmur. No friction rub. No gallop. Pulmonary:      Effort: Pulmonary effort is normal. No respiratory distress. Breath sounds: Normal breath sounds. No wheezing or rales. Chest:      Chest wall: No tenderness. Abdominal:      General: Bowel sounds are normal. There is no distension. Palpations: Abdomen is soft. There is no mass. Tenderness: There is no abdominal tenderness. There is no guarding or rebound. Musculoskeletal: Normal range of motion. General: No tenderness or deformity. Skin:     General: Skin is warm. Findings: No rash. Neurological:      Mental Status: She is alert and oriented to person, place, and time. Cranial Nerves: No cranial nerve deficit. Motor: No abnormal muscle tone. Coordination: Coordination normal.      Deep Tendon Reflexes: Reflexes normal.         Diagnostic Study Results     Labs -   I have personally reviewed and interpreted all laboratory results.    Recent Results (from the past 24 hour(s))   URINALYSIS W/ REFLEX CULTURE    Collection Time: 12/14/20  4:57 PM    Specimen: Urine   Result Value Ref Range    Color YELLOW/STRAW      Appearance CLEAR CLEAR      Specific gravity 1.025 1.003 - 1.030      pH (UA) 6.0 5.0 - 8.0      Protein Negative NEG mg/dL    Glucose Negative NEG mg/dL    Ketone Negative NEG mg/dL    Bilirubin Negative NEG      Blood Negative NEG      Urobilinogen 0.2 0.2 - 1.0 EU/dL    Nitrites Negative NEG      Leukocyte Esterase SMALL (A) NEG      WBC 0-4 0 - 4 /hpf    RBC 0-5 0 - 5 /hpf    Epithelial cells FEW FEW /lpf    Bacteria Negative NEG /hpf    UA:UC IF INDICATED CULTURE NOT INDICATED BY UA RESULT CNI     HCG URINE, QL. - POC    Collection Time: 12/14/20  5:34 PM   Result Value Ref Range    Pregnancy test,urine (POC) Negative NEG         Radiologic Studies -   I have personally reviewed and interpreted all imaging studies and agree with radiology interpretation and report. No orders to display     CT Results  (Last 48 hours)    None        CXR Results  (Last 48 hours)    None          Medical Decision Making   I reviewed the vital signs, available nursing notes, past medical history, past surgical history, family history and social history. Vital Signs-Reviewed the patient's vital signs. Patient Vitals for the past 24 hrs:   Temp Pulse Resp BP SpO2   12/14/20 1624 97.2 °F (36.2 °C) 74 16 112/64 100 %       Pulse Oximetry Analysis - 100% on RA    Cardiac Monitor:   Rate: 74 bpm  Rhythm: Normal Sinus Rhythm      Records Reviewed: Nursing Notes, Old Medical Records, Previous electrocardiograms, Previous Radiology Studies and Previous Laboratory Studies    Provider Notes (Medical Decision Making):   Pt presents with acute abdominal pain, nausea and diarrhea; vital signs stable with currently a non-peritoneal exam; DDx includes: Gastroenteritis, UTI, pregnancy, viral illness, GERD, PUD, pt with stable vitals, benign abd exam and asking for food. Clinically not dehydrated, can safely defer labs and imaging. Treat symptomatically and obtain serial abdominal exams to determine if additional imaging is indicated. Will reassess and monitor closely. ED Course:    I am the first provider for this patient's ED visit today. Initial assessment performed. I discussed presenting problems, concerns and my formulated plan for today's visit with the patient and any available family members at bedside. I encouraged them to ask questions as they arise throughout the visit. I reviewed our electronic medical record system for any past medical records that were available that may contribute to the patient's current condition, the nursing notes and vital signs from today's visit. Kimberlee Baker MD    ED Orders Placed :  Orders Placed This Encounter    URINALYSIS W/ REFLEX CULTURE    POC URINE PREGNANCY TEST    HCG URINE, QL. - POC    loperamide (IMODIUM) capsule 2 mg    dicyclomine (BENTYL) capsule 10 mg    dicyclomine (BENTYL) 10 mg capsule    loperamide (IMODIUM) 2 mg capsule    ondansetron (Zofran ODT) 4 mg disintegrating tablet    aluminum & magnesium hydroxide-simethicone (Maalox Maximum Strength) 400-400-40 mg/5 mL suspension       ED Medications Administered:  Medications   loperamide (IMODIUM) capsule 2 mg (2 mg Oral Given 12/14/20 1755)   dicyclomine (BENTYL) capsule 10 mg (10 mg Oral Given 12/14/20 1755)         Progress Note:  I have re-examined the patient. Pt states she feels much better and symptoms improved. Tolerating oral intake. Abdomen is soft and without guarding, rebound or other peritoneal signs. I have discussed with patient the importance of close f/u and to return to the ED if symptoms don't improve or worsen. Progress Note:  Patient has been reassessed and reports feeling better and symptoms have improved significantly after ED treatment. Alisson Canada's final labs and imaging have been reviewed with her and available family and/or caregiver. They have been counseled regarding her diagnosis.  She verbally conveys understanding and agreement of the signs, symptoms, diagnosis, treatment and prognosis and additionally agrees to follow up as recommended with Dr. Jennifer Forbes, Lisette Pinto MD and/or specialist in 24 - 48 hours. She also agrees with the care-plan we created together and conveys that all of her questions have been answered. I have also put together some discharge instructions for her that include: 1) educational information regarding their diagnosis, 2) how to care for their diagnosis at home, as well a 3) list of reasons why they would want to return to the ED prior to their follow-up appointment should the patient's condition change or symptoms worsen. I have answered all questions to the patient's satisfaction. Strict return precautions given. She both understood and agreed with plan as discussed. Vital signs stable for discharge. Pt very appreciative of care today. Disposition: Discharge  The pt is ready for discharge. The pt's signs, symptoms, diagnosis, and discharge instructions have been discussed and pt and/or family have conveyed their understanding. The pt is to follow up as recommended or return to ER should their symptoms worsen. Plan has been discussed and all parties are in full agreement. Plan:  1. Return precautions as discussed with patient and available family and/or caregiver. 2.   Discharge Medication List as of 12/14/2020  5:47 PM      START taking these medications    Details   dicyclomine (BENTYL) 10 mg capsule Take 1 Cap by mouth three (3) times daily. , Normal, Disp-20 Cap,R-0      loperamide (IMODIUM) 2 mg capsule Take 1 Cap by mouth four (4) times daily as needed for Diarrhea for up to 10 days. , Normal, Disp-20 Cap,R-0      ondansetron (Zofran ODT) 4 mg disintegrating tablet Take 1 Tab by mouth every eight (8) hours as needed for Nausea or Vomiting., Normal, Disp-20 Tab,R-0      aluminum & magnesium hydroxide-simethicone (Maalox Maximum Strength) 400-400-40 mg/5 mL suspension Take 10 mL by mouth every six (6) hours as needed for Indigestion. , Normal, Disp-335 mL,R-0         CONTINUE these medications which have NOT CHANGED    Details   levonorgestrel-ethin estradiol (LARISSIA PO) Take  by mouth., Historical Med      ALBUTEROL IN Take  by inhalation. , Historical Med           3. Follow-up Information     Follow up With Specialties Details Why Contact Info    daniel Ayala MD [facility] [phone] [phone]      [facility] [place] EMERGENCY DEPT Emergency Medicine  As needed, If symptoms worsen Olive Caballero          Instructed to return to ED if worse  Diagnosis   Clinical Impression:  1. Acute abdominal pain    2. Diarrhea, unspecified type    3. Nausea without vomiting    4. Foodborne gastroenteritis        Attestation:  Shahida Berrios MD, am the attending of record for this patient. I personally performed the services described in this documentation on this date, 12/14/2020 for patient, Tony Seay. I have reviewed the chart and verified that the record is accurate and complete.

## 2020-12-29 ENCOUNTER — HOSPITAL ENCOUNTER (EMERGENCY)
Age: 25
Discharge: HOME | End: 2020-12-29
Attending: EMERGENCY MEDICINE | Admitting: EMERGENCY MEDICINE
Payer: COMMERCIAL

## 2020-12-29 VITALS
HEART RATE: 84 BPM | HEIGHT: 68 IN | BODY MASS INDEX: 18.19 KG/M2 | SYSTOLIC BLOOD PRESSURE: 122 MMHG | DIASTOLIC BLOOD PRESSURE: 56 MMHG | WEIGHT: 120 LBS | TEMPERATURE: 98 F | RESPIRATION RATE: 18 BRPM | OXYGEN SATURATION: 98 %

## 2020-12-29 DIAGNOSIS — A74.9 CHLAMYDIA: ICD-10-CM

## 2020-12-29 DIAGNOSIS — N76.0 ACUTE VAGINITIS: Primary | ICD-10-CM

## 2020-12-29 LAB
APPEARANCE UR: CLEAR
BACTERIA URNS QL MICRO: NEGATIVE /HPF
BILIRUB UR QL: NEGATIVE
CLUE CELLS VAG QL WET PREP: NORMAL
COLOR UR: ABNORMAL
EPITH CASTS URNS QL MICRO: ABNORMAL /LPF
GLUCOSE UR STRIP.AUTO-MCNC: NEGATIVE MG/DL
HCG UR QL: NEGATIVE
HGB UR QL STRIP: NEGATIVE
KETONES UR QL STRIP.AUTO: NEGATIVE MG/DL
KOH PREP SPEC: NORMAL
LEUKOCYTE ESTERASE UR QL STRIP.AUTO: ABNORMAL
NITRITE UR QL STRIP.AUTO: NEGATIVE
PH UR STRIP: 7.5 [PH] (ref 5–8)
PROT UR STRIP-MCNC: NEGATIVE MG/DL
RBC #/AREA URNS HPF: ABNORMAL /HPF (ref 0–5)
SERVICE CMNT-IMP: NORMAL
SP GR UR REFRACTOMETRY: 1.02 (ref 1–1.03)
T VAGINALIS VAG QL WET PREP: NORMAL
UA: UC IF INDICATED,UAUC: ABNORMAL
UROBILINOGEN UR QL STRIP.AUTO: 0.2 EU/DL (ref 0.2–1)
WBC URNS QL MICRO: ABNORMAL /HPF (ref 0–4)

## 2020-12-29 PROCEDURE — 81025 URINE PREGNANCY TEST: CPT

## 2020-12-29 PROCEDURE — 81001 URINALYSIS AUTO W/SCOPE: CPT

## 2020-12-29 PROCEDURE — 87210 SMEAR WET MOUNT SALINE/INK: CPT

## 2020-12-29 PROCEDURE — 87491 CHLMYD TRACH DNA AMP PROBE: CPT

## 2020-12-29 PROCEDURE — 99283 EMERGENCY DEPT VISIT LOW MDM: CPT

## 2020-12-29 NOTE — ED TRIAGE NOTES
Pt reports pain on urination and vaginal irritation x 1 day after using body wash that didn't agree with her.

## 2020-12-30 LAB
C TRACH DNA SPEC QL NAA+PROBE: POSITIVE
N GONORRHOEA DNA SPEC QL NAA+PROBE: NEGATIVE
SAMPLE TYPE: ABNORMAL
SERVICE CMNT-IMP: ABNORMAL
SPECIMEN SOURCE: ABNORMAL

## 2020-12-30 RX ORDER — DOXYCYCLINE 100 MG/1
100 CAPSULE ORAL 2 TIMES DAILY
Qty: 14 CAP | Refills: 0 | Status: SHIPPED | OUTPATIENT
Start: 2020-12-30 | End: 2021-01-06

## 2020-12-30 NOTE — ED NOTES
Emergency Department Nursing Plan of Care       The Nursing Plan of Care is developed from the Nursing assessment and Emergency Department Attending provider initial evaluation. The plan of care may be reviewed in the ED Provider note.     The Plan of Care was developed with the following considerations:   Patient / Family readiness to learn indicated by:verbalized understanding  Persons(s) to be included in education: patient  Barriers to Learning/Limitations:No    Signed     José Ramírez RN    12/29/2020   7:55 PM

## 2020-12-30 NOTE — ED NOTES
Discharge instructions were given to the patient by Esperanza Fields RN. The patient left the Emergency Department ambulatory, alert and oriented and in no acute distress with 0 prescriptions. The patient was encouraged to call or return to the ED for worsening issues or problems and was encouraged to schedule a follow up appointment for continuing care. The patient verbalized understanding of discharge instructions and prescriptions, all questions were answered. The patient has no further concerns at this time.

## 2020-12-30 NOTE — ED PROVIDER NOTES
EMERGENCY DEPARTMENT HISTORY AND PHYSICAL EXAM    Date: 12/29/2020  Patient Name: Omer Flores    History of Presenting Illness     Chief Complaint   Patient presents with    Vaginal Itching    Urinary Pain         History Provided By: Patient    HPI: Omer Flores is a 30 y.o. female with a PMH of asthma who presents with vaginal irritation since yesterday. Pt states she used some new body wash and is now irritated and thinks she has a UTI. Pt c/o pain 8/10 and burning in nature. Pt denies any vaginal lesions or sores, no abd pain, no fevers, no chills, no dysuria or urinary frequency    PCP: Mari Forde MD    Current Outpatient Medications   Medication Sig Dispense Refill    levonorgestrel-ethin estradiol (LARISSIA PO) Take  by mouth.  ALBUTEROL IN Take  by inhalation. Past History     Past Medical History:  Past Medical History:   Diagnosis Date    Asthma        Past Surgical History:  Past Surgical History:   Procedure Laterality Date    HX ACL RECONSTRUCTION Left 2012    HX ORTHOPAEDIC  2012    left ACL repaired       Family History:  Family History   Problem Relation Age of Onset    No Known Problems Mother     Cancer Father     No Known Problems Sister     No Known Problems Brother     Cancer Maternal Grandmother     Cancer Maternal Grandfather     Cancer Paternal Grandmother     Cancer Paternal Grandfather        Social History:  Social History     Tobacco Use    Smoking status: Never Smoker    Smokeless tobacco: Never Used   Substance Use Topics    Alcohol use: No    Drug use: No       Allergies: Allergies   Allergen Reactions    Latex Other (comments)     Skin irritation    Amoxicillin Itching    Pcn [Penicillins] Hives    Shellfish Derived Itching         Review of Systems   Review of Systems   Constitutional: Negative for chills and fever. Gastrointestinal: Negative for abdominal pain, nausea and vomiting.    Genitourinary: Positive for vaginal pain. Negative for dysuria. Skin: Negative. Allergic/Immunologic: Negative for immunocompromised state. Neurological: Negative for speech difficulty and weakness. All other systems reviewed and are negative. Physical Exam     Vitals:    12/29/20 1827   BP: (!) 122/56   Pulse: 84   Resp: 18   Temp: 98 °F (36.7 °C)   SpO2: 98%   Weight: 54.4 kg (120 lb)   Height: 5' 8\" (1.727 m)     Physical Exam  Vitals signs and nursing note reviewed. Constitutional:       General: She is not in acute distress. Appearance: She is well-developed. HENT:      Head: Normocephalic and atraumatic. Eyes:      Conjunctiva/sclera: Conjunctivae normal.   Cardiovascular:      Rate and Rhythm: Normal rate and regular rhythm. Heart sounds: Normal heart sounds. Pulmonary:      Effort: Pulmonary effort is normal. No respiratory distress. Breath sounds: Normal breath sounds. No wheezing or rales. Abdominal:      General: Bowel sounds are normal. There is no distension. Palpations: Abdomen is soft. Tenderness: There is no abdominal tenderness. There is no guarding or rebound. Genitourinary:     Comments: Pt self swabbed for labs,  exam deferred  Skin:     General: Skin is warm and dry. Neurological:      Mental Status: She is alert and oriented to person, place, and time. Psychiatric:         Behavior: Behavior normal.         Thought Content: Thought content normal.         Judgment: Judgment normal.           Diagnostic Study Results     Labs -     Recent Results (from the past 12 hour(s))   HCG URINE, QL. - POC    Collection Time: 12/29/20  7:11 PM   Result Value Ref Range    Pregnancy test,urine (POC) Negative NEG     KOH, OTHER SOURCES    Collection Time: 12/29/20  7:12 PM    Specimen: Vagina;  Other   Result Value Ref Range    Special Requests: NO SPECIAL REQUESTS      KOH NO YEAST SEEN     WET PREP    Collection Time: 12/29/20  7:12 PM    Specimen: Miscellaneous sample   Result Value Ref Range    Clue cells CLUE CELLS ABSENT      Wet prep NO TRICHOMONAS SEEN     URINALYSIS W/ REFLEX CULTURE    Collection Time: 12/29/20  7:12 PM    Specimen: Urine   Result Value Ref Range    Color YELLOW/STRAW      Appearance CLEAR CLEAR      Specific gravity 1.020 1.003 - 1.030      pH (UA) 7.5 5.0 - 8.0      Protein Negative NEG mg/dL    Glucose Negative NEG mg/dL    Ketone Negative NEG mg/dL    Bilirubin Negative NEG      Blood Negative NEG      Urobilinogen 0.2 0.2 - 1.0 EU/dL    Nitrites Negative NEG      Leukocyte Esterase TRACE (A) NEG      WBC 5-10 0 - 4 /hpf    RBC 0-5 0 - 5 /hpf    Epithelial cells MODERATE (A) FEW /lpf    Bacteria Negative NEG /hpf    UA:UC IF INDICATED CULTURE NOT INDICATED BY UA RESULT CNI         Radiologic Studies -   No orders to display     CT Results  (Last 48 hours)    None        CXR Results  (Last 48 hours)    None            Medical Decision Making   I am the first provider for this patient. I reviewed the vital signs, available nursing notes, past medical history, past surgical history, family history and social history. Vital Signs-Reviewed the patient's vital signs. Records Reviewed: Nursing Notes    Disposition:  Discharged    DISCHARGE NOTE:   7:30 PM      Care plan outlined and precautions discussed. Patient has no new complaints, changes, or physical findings. Results of UA and swabs were reviewed with the patient. All medications were reviewed with the patient; will d/c home. All of pt's questions and concerns were addressed. Patient was instructed and agrees to follow up with GYN, as well as to return to the ED upon further deterioration. Patient is ready to go home.     Follow-up Information     Follow up With Specialties Details Why [address] [facility]  In 1 week As needed [address] [facility] [id number]          Current Discharge Medication List          Provider Notes (Medical Decision Making):   Pt presents with vaginal irritation since yesterday after using body wash. DDX: allergic reaction, UTI, vaginitis, STI, yeast.  Will send of UA and pelvic swabs but symptoms likely related to new body wash and more of allergic reaction. Procedures:  Procedures    Please note that this dictation was completed with Dragon, computer voice recognition software. Quite often unanticipated grammatical, syntax, homophones, and other interpretive errors are inadvertently transcribed by the computer software. Please disregard these errors. Additionally, please excuse any errors that have escaped final proofreading. Diagnosis     Clinical Impression:   1.  Acute vaginitis

## 2020-12-30 NOTE — PROGRESS NOTES
Spoke with patient on the phone and educated on positive chlamydia test results. Doxycycline sent to preferred pharmacy. Patient educated on abstinence for 2 weeks and educating partners.

## 2021-06-22 ENCOUNTER — HOSPITAL ENCOUNTER (EMERGENCY)
Age: 26
Discharge: HOME | End: 2021-06-22
Attending: EMERGENCY MEDICINE
Payer: COMMERCIAL

## 2021-06-22 VITALS
RESPIRATION RATE: 16 BRPM | HEIGHT: 68 IN | DIASTOLIC BLOOD PRESSURE: 73 MMHG | SYSTOLIC BLOOD PRESSURE: 114 MMHG | OXYGEN SATURATION: 99 % | TEMPERATURE: 98.1 F | BODY MASS INDEX: 21.98 KG/M2 | HEART RATE: 72 BPM | WEIGHT: 145 LBS

## 2021-06-22 DIAGNOSIS — H00.025 HORDEOLUM INTERNUM OF LEFT LOWER EYELID: Primary | ICD-10-CM

## 2021-06-22 PROCEDURE — 99282 EMERGENCY DEPT VISIT SF MDM: CPT

## 2021-06-22 NOTE — ED TRIAGE NOTES
Pt is alert and oriented. Pt presents to ED today for left eye pain that started late last night. Mild swelling and redness noted to left eye. Pt states works for [employer], is unsure if she has some metal in her eye that's causing irritation or if it's a stye. Pt states flushed eye last night and again this morning without relief. Emergency Department Nursing Plan of Care       The Nursing Plan of Care is developed from the Nursing assessment and Emergency Department Attending provider initial evaluation. The plan of care may be reviewed in the ED Provider note.     The Plan of Care was developed with the following considerations:   Patient / Family readiness to learn indicated by:verbalized understanding  Persons(s) to be included in education: patient  Barriers to Learning/Limitations:No    Signed     Aries Hernandez RN    6/22/2021   9:37 AM

## 2021-06-22 NOTE — ED PROVIDER NOTES
EMERGENCY DEPARTMENT HISTORY AND PHYSICAL EXAM      Date: 6/22/2021  Patient Name: Evelio Hand    History of Presenting Illness     Chief Complaint   Patient presents with    Eye Pain     History Provided By: Patient    HPI: Evelio Hand, 27 y.o. female with medical history significant for asthma who presents via self to the ED with cc of acute moderate aching left lower eyelid pain and swelling X 1 day. No known injuries or trauma. Endorse that she sometimes wears glasses, no contacts. No fever, chills, nausea, vomiting, drainage, visual disturbance, photophobia, headache, lightheadedness, dizziness. No medications or modifying factors. PCP: Meeta Rodriguez MD    There are no other complaints, changes, or physical findings at this time. No current facility-administered medications on file prior to encounter. Current Outpatient Medications on File Prior to Encounter   Medication Sig Dispense Refill    ALBUTEROL IN Take  by inhalation.  levonorgestrel-ethin estradiol (LARISSIA PO) Take  by mouth. (Patient not taking: Reported on 6/22/2021)       Past History     Past Medical History:  Past Medical History:   Diagnosis Date    Asthma      Past Surgical History:  Past Surgical History:   Procedure Laterality Date    HX ACL RECONSTRUCTION Left 2012    HX ORTHOPAEDIC  2012    left ACL repaired     Family History:  Family History   Problem Relation Age of Onset    No Known Problems Mother     Cancer Father     No Known Problems Sister     No Known Problems Brother     Cancer Maternal Grandmother     Cancer Maternal Grandfather     Cancer Paternal Grandmother     Cancer Paternal Grandfather      Social History:  Social History     Tobacco Use    Smoking status: Never Smoker    Smokeless tobacco: Never Used   Substance Use Topics    Alcohol use: No    Drug use: No     Allergies:   Allergies   Allergen Reactions    Latex Other (comments)     Skin irritation    Amoxicillin Itching    Pcn [Penicillins] Hives    Shellfish Derived Itching     Review of Systems   Review of Systems   Constitutional: Negative for activity change, chills, fatigue and fever. HENT: Negative for congestion, dental problem, ear discharge, ear pain, facial swelling, hearing loss, mouth sores, postnasal drip, rhinorrhea, sinus pressure, sore throat, tinnitus and trouble swallowing. Eyes: Positive for pain. Negative for photophobia, discharge, redness, itching and visual disturbance. Respiratory: Negative for apnea, cough and shortness of breath. Cardiovascular: Negative for chest pain. Gastrointestinal: Negative for abdominal pain, diarrhea, nausea and vomiting. Genitourinary: Negative. Negative for dysuria and frequency. Musculoskeletal: Negative for arthralgias and neck pain. Skin: Negative. Negative for rash. Neurological: Negative for dizziness, facial asymmetry, weakness, numbness and headaches. Psychiatric/Behavioral: Negative. Physical Exam   Physical Exam  Vitals and nursing note reviewed. Constitutional:       General: She is not in acute distress. Appearance: Normal appearance. She is well-developed. She is not ill-appearing, toxic-appearing or diaphoretic. HENT:      Head: Normocephalic and atraumatic. Right Ear: Hearing and external ear normal.      Left Ear: Hearing and external ear normal.      Nose: Nose normal.   Eyes:      General: Vision grossly intact. No allergic shiner, visual field deficit or scleral icterus. Right eye: No foreign body, discharge or hordeolum. Left eye: Hordeolum present. No foreign body or discharge. Extraocular Movements: Extraocular movements intact. Right eye: Normal extraocular motion and no nystagmus. Left eye: Normal extraocular motion and no nystagmus. Conjunctiva/sclera: Conjunctivae normal.      Right eye: Right conjunctiva is not injected. No chemosis, exudate or hemorrhage. Left eye: Left conjunctiva is not injected. No chemosis, exudate or hemorrhage. Pupils: Pupils are equal, round, and reactive to light. Right eye: Pupil is round, reactive and not sluggish. No corneal abrasion. Left eye: Pupil is round, reactive and not sluggish. No corneal abrasion. Pulmonary:      Effort: Pulmonary effort is normal. No respiratory distress. Musculoskeletal:         General: Normal range of motion. Cervical back: Normal range of motion. Skin:     General: Skin is warm and dry. Neurological:      Mental Status: She is alert and oriented to person, place, and time. Psychiatric:         Behavior: Behavior normal.         Thought Content: Thought content normal.         Judgment: Judgment normal.       Diagnostic Study Results   Labs -   No results found for this or any previous visit (from the past 12 hour(s)). Radiologic Studies -   No orders to display     No results found. Medical Decision Making   I am the first provider for this patient. I reviewed the vital signs, available nursing notes, past medical history, past surgical history, family history and social history. Vital Signs-Reviewed the patient's vital signs. Patient Vitals for the past 24 hrs:   Temp Pulse Resp BP SpO2   06/22/21 0930 98.1 °F (36.7 °C) 72 16 114/73 99 %     Pulse Oximetry Analysis - 99% on RA (normal)    Records Reviewed: Nursing Notes, Old Medical Records, Previous Radiology Studies and Previous Laboratory Studies    Provider Notes (Medical Decision Making):   55-year-old female presents with left eye pain and lid swelling X 1 day. No sign of corneal abrasion or conjunctivitis on exam.  Hordeolum noted to left medial lower lid. Will treat with warm compresses. No indication for antibiotics. Follow-up with ophthalmology. ED Course:   Initial assessment performed.  The patients presenting problems have been discussed, and they are in agreement with the care plan formulated and outlined with them. I have encouraged them to ask questions as they arise throughout their visit. Progress Note:   Updated pt on all returned results and findings. Discussed the importance of proper follow up as referred below along with return precautions. Pt in agreement with the care plan and expresses agreement with and understanding of all items discussed. Disposition:  9:49 AM  I have discussed with patient their diagnosis, treatment, and follow up plan. The patient agrees to follow up as outlined in discharge paperwork and also to return to the ED with any worsening. Cece Morgan PA-C      PLAN:  1. Current Discharge Medication List        2. Follow-up Information     Follow up With Specialties Details Why Contact Info    daniel Zarate MD Family Medicine Schedule an appointment as soon as possible for a visit in 2 days As needed [address] [facility] [address]      Romy Willingham M.D.  Schedule an appointment as soon as possible for a visit in 2 days As needed [address] [address]        Return to ED if worse     Diagnosis     Clinical Impression:   1. Hordeolum internum of left lower eyelid            Please note that this dictation was completed with Dragon, computer voice recognition software. Quite often unanticipated grammatical, syntax, homophones, and other interpretive errors are inadvertently transcribed by the computer software. Please disregard these errors. Additionally, please excuse any errors that have escaped final proofreading.

## 2021-06-22 NOTE — DISCHARGE INSTRUCTIONS
It was a pleasure taking care of you in our Emergency Department today. We know that when you come to [address] [place] [address], you are entrusting us with your health, comfort, and safety. Our physicians and nurses honor that trust, and truly appreciate the opportunity to care for you and your loved ones. We also value your feedback. If you receive a survey about your Emergency Department experience today, please fill it out. We care about our patients' feedback, and we listen to what you have to say. Thank you!

## 2021-06-22 NOTE — Clinical Note
[facility] EMERGENCY DEPT  [address]  [address]  [phone]    Work/School Note    Date: 6/22/2021    To Whom It May concern:      Roseann Richard was seen and treated today in the emergency room by the following provider(s):  Attending Provider: Ashwin Leger MD  Physician Assistant: Russ Tejeda PA-C. Roseann Richard is excused from work/school on 06/22/21. She is clear to return to work/school on 06/23/21.         Sincerely,          Romel Clemente PA-C

## 2021-06-23 ENCOUNTER — HOSPITAL ENCOUNTER (EMERGENCY)
Age: 26
Discharge: HOME | End: 2021-06-23
Attending: EMERGENCY MEDICINE
Payer: COMMERCIAL

## 2021-06-23 VITALS
OXYGEN SATURATION: 99 % | DIASTOLIC BLOOD PRESSURE: 59 MMHG | WEIGHT: 145 LBS | BODY MASS INDEX: 22.05 KG/M2 | HEART RATE: 64 BPM | RESPIRATION RATE: 14 BRPM | SYSTOLIC BLOOD PRESSURE: 111 MMHG | TEMPERATURE: 98.1 F

## 2021-06-23 DIAGNOSIS — H00.015 HORDEOLUM EXTERNUM OF LEFT LOWER EYELID: Primary | ICD-10-CM

## 2021-06-23 PROCEDURE — 99282 EMERGENCY DEPT VISIT SF MDM: CPT

## 2021-06-23 RX ORDER — ERYTHROMYCIN 5 MG/G
OINTMENT OPHTHALMIC
Qty: 3.5 G | Refills: 0 | Status: SHIPPED | OUTPATIENT
Start: 2021-06-23 | End: 2021-06-30

## 2021-06-23 NOTE — DISCHARGE INSTRUCTIONS
It was a pleasure taking care of you in our Emergency Department today. We know that when you come to [facility], you are entrusting us with your health, comfort, and safety. Our physicians and nurses honor that trust, and truly appreciate the opportunity to care for you and your loved ones. We also value your feedback. If you receive a survey about your Emergency Department experience today, please fill it out. We care about our patients' feedback, and we listen to what you have to say. Thank you!

## 2021-06-23 NOTE — LETTER
[address]  [facility] EMERGENCY DEPT  [address] [facility]  [place] [address]  [phone]    Work/School Note    Date: 6/23/2021    To Whom It May concern:    Becka Gr was seen and treated today in the emergency room by the following provider(s):  Attending Provider: Jennifer Zepeda MD  Physician Assistant: Jaime Harrington. Please excuse Becka Gr from work today.       Sincerely,          Shanda Arevalo, [address] Lal

## 2021-06-24 PROCEDURE — 99283 EMERGENCY DEPT VISIT LOW MDM: CPT

## 2021-06-25 ENCOUNTER — HOSPITAL ENCOUNTER (EMERGENCY)
Age: 26
Discharge: HOME | End: 2021-06-25
Attending: EMERGENCY MEDICINE | Admitting: EMERGENCY MEDICINE
Payer: COMMERCIAL

## 2021-06-25 VITALS
DIASTOLIC BLOOD PRESSURE: 82 MMHG | HEIGHT: 68 IN | BODY MASS INDEX: 22.22 KG/M2 | TEMPERATURE: 98 F | RESPIRATION RATE: 18 BRPM | HEART RATE: 75 BPM | SYSTOLIC BLOOD PRESSURE: 125 MMHG | WEIGHT: 146.61 LBS | OXYGEN SATURATION: 100 %

## 2021-06-25 DIAGNOSIS — L03.213 PERIORBITAL CELLULITIS OF LEFT EYE: ICD-10-CM

## 2021-06-25 DIAGNOSIS — H00.015 HORDEOLUM EXTERNUM LEFT LOWER EYELID: Primary | ICD-10-CM

## 2021-06-25 PROCEDURE — 74011250637 HC RX REV CODE- 250/637: Performed by: EMERGENCY MEDICINE

## 2021-06-25 RX ORDER — DIPHENHYDRAMINE HCL 25 MG
50 CAPSULE ORAL
Qty: 20 CAPSULE | Refills: 0 | Status: SHIPPED | OUTPATIENT
Start: 2021-06-25 | End: 2021-06-30

## 2021-06-25 RX ORDER — CEFDINIR 300 MG/1
300 CAPSULE ORAL
Status: COMPLETED | OUTPATIENT
Start: 2021-06-25 | End: 2021-06-25

## 2021-06-25 RX ORDER — DIPHENHYDRAMINE HCL 25 MG
50 CAPSULE ORAL
Status: COMPLETED | OUTPATIENT
Start: 2021-06-25 | End: 2021-06-25

## 2021-06-25 RX ORDER — CEFDINIR 300 MG/1
300 CAPSULE ORAL 2 TIMES DAILY
Qty: 10 CAPSULE | Refills: 0 | Status: SHIPPED | OUTPATIENT
Start: 2021-06-25 | End: 2021-06-30

## 2021-06-25 RX ORDER — CLINDAMYCIN HYDROCHLORIDE 300 MG/1
300 CAPSULE ORAL 3 TIMES DAILY
Qty: 15 CAPSULE | Refills: 0 | Status: SHIPPED | OUTPATIENT
Start: 2021-06-25 | End: 2021-06-30

## 2021-06-25 RX ORDER — CLINDAMYCIN HYDROCHLORIDE 150 MG/1
300 CAPSULE ORAL
Status: COMPLETED | OUTPATIENT
Start: 2021-06-25 | End: 2021-06-25

## 2021-06-25 RX ADMIN — CEFDINIR 300 MG: 300 CAPSULE ORAL at 01:55

## 2021-06-25 RX ADMIN — DIPHENHYDRAMINE HYDROCHLORIDE 50 MG: 25 CAPSULE ORAL at 01:55

## 2021-06-25 RX ADMIN — CLINDAMYCIN HYDROCHLORIDE 300 MG: 150 CAPSULE ORAL at 01:55

## 2021-06-25 NOTE — DISCHARGE INSTRUCTIONS
It was a pleasure taking care of you in our Emergency Department today. We know that when you come to [facility], you are entrusting us with your health, comfort, and safety. Our physicians and nurses honor that trust, and truly appreciate the opportunity to care for you and your loved ones. We also value your feedback. If you receive a survey about your Emergency Department experience today, please fill it out. We care about our patients' feedback, and we listen to what you have to say. Thank you!        - Use warm, moist compresses and place on your affected eye frequently (eg, for 5 to 10 minutes three to five times per day) in order to facilitate drainage.   - Massage and gentle wiping of the affected eyelid after the warm compress can also aid in drainage. - If normally used, you should discontinue eye makeup to support healing.   - If, despite management with warm compresses and antibiotics the lesion does not reduce in size within 5-7 days, please follow up with an eye doctor. - If you develop fevers, vision changes, worsening redness around the eye, worsening pain or any other symptoms that are new or concerning to you, please go to the nearest emergency room.

## 2021-06-25 NOTE — ED NOTES
Crow Roche MD reviewed discharge instructions with the patient. The patient verbalized understanding. All questions and concerns were addressed. The patient is discharged ambulatory with instructions and prescriptions in hand. Pt is alert and oriented x 4. Respirations are clear and unlabored.

## 2021-06-25 NOTE — ED PROVIDER NOTES
EMERGENCY DEPARTMENT HISTORY AND PHYSICAL EXAM    Please note that this dictation was completed with [employer], the computer voice recognition software. Quite often unanticipated grammatical, syntax, homophones, and other interpretive errors are inadvertently transcribed by the computer software. Please disregard these errors. Please excuse any errors that have escaped final proofreading. Date: 6/25/2021  Patient Name: Adela Vigil  Patient Age and Sex: 27 y.o. female    History of Presenting Illness     Chief Complaint   Patient presents with    Eye Pain     Patient having swelling of lower L eyelid with some discharge, patient having pain in eye and surrounding area. Was here a few days ago and given a cream that made her eye burn. History Provided By: Patient    HPI: Adela Vigil, is a 27 y.o. female presents with worsening left eye discomfort, swelling, redness and itching. Dx with hordeolum of left lower eyelid on 6/23. Discharged home on erythromycin ointment. Over last 2 days, eye discomfort, swelling, redness all got worse. No fevers or chills. No discharge from the eye. No vision changes. No pain with eye movements. Pt denies any other alleviating or exacerbating factors. No other associated signs or symptoms. There are no other complaints, changes or physical findings at this time.      PCP: Renée Bryan, Prabhu Solis MD    Past History   All documented elements of the [address] reviewed and verified by me. -Jesus Camara MD    Past Medical History:  Past Medical History:   Diagnosis Date    Asthma        Past Surgical History:  Past Surgical History:   Procedure Laterality Date    HX ACL RECONSTRUCTION Left 2012    HX ORTHOPAEDIC  2012    left ACL repaired       Family History:  Family History   Problem Relation Age of Onset    No Known Problems Mother     Cancer Father     No Known Problems Sister     No Known Problems Brother     Cancer Maternal Grandmother     Cancer Maternal Grandfather     Cancer Paternal Grandmother     Cancer Paternal Grandfather        Social History:  Social History     Tobacco Use    Smoking status: Never Smoker    Smokeless tobacco: Never Used   Substance Use Topics    Alcohol use: No    Drug use: No       Allergies: Allergies   Allergen Reactions    Latex Other (comments)     Skin irritation    Amoxicillin Itching    Pcn [Penicillins] Hives    Shellfish Derived Itching       Review of Systems   All other systems reviewed and negative    Review of Systems   Constitutional: Negative for appetite change and fever. HENT: Negative. Negative for congestion, facial swelling and sore throat. Eyes: Positive for pain, redness and itching. Negative for photophobia, discharge and visual disturbance. Respiratory: Negative for cough and shortness of breath. Cardiovascular: Negative for chest pain and palpitations. Gastrointestinal: Negative for abdominal pain, diarrhea, nausea and vomiting. Endocrine: Negative. Genitourinary: Negative for dysuria and flank pain. Musculoskeletal: Negative for back pain. Skin: Positive for color change. Neurological: Negative for facial asymmetry, light-headedness and headaches. Hematological: Negative. All other systems reviewed and are negative. Physical Exam   Reviewed patients vital signs and nursing note    Physical Exam  Vitals and nursing note reviewed. HENT:      Head: Atraumatic. Nose: Nose normal.      Mouth/Throat:      Mouth: Mucous membranes are moist.   Eyes:      General: No scleral icterus. Right eye: No discharge. Left eye: No discharge. Extraocular Movements: Extraocular movements intact. Conjunctiva/sclera: Conjunctivae normal.      Pupils: Pupils are equal, round, and reactive to light. Comments: Hordeolum of medial left lower eyelid. Eyelid edematous. Periorbital erythema and mild edema.    Cardiovascular:      Rate and Rhythm: Normal rate and regular rhythm. Pulses: Normal pulses. Heart sounds: Normal heart sounds. Pulmonary:      Effort: Pulmonary effort is normal.      Breath sounds: Normal breath sounds. Abdominal:      Palpations: Abdomen is soft. Tenderness: There is no abdominal tenderness. Musculoskeletal:         General: Normal range of motion. Cervical back: Normal range of motion and neck supple. Skin:     General: Skin is warm and dry. Capillary Refill: Capillary refill takes less than 2 seconds. Neurological:      General: No focal deficit present. Mental Status: She is alert. Psychiatric:         Mood and Affect: Mood normal.         Behavior: Behavior normal.         Diagnostic Study Results     Labs - I have personally reviewed and interpreted all laboratory results. Lisa Ho MD, MSc  No results found for this or any previous visit (from the past 24 hour(s)). Radiologic Studies - I have personally reviewed and interpreted all imaging studies and agree with radiology interpretation and report. - Lisa Ho MD, MSc  No orders to display         Medical Decision Making   I am the first provider for this patient. Records Reviewed: I reviewed our electronic medical record system for any past medical records that were available that may contribute to the patient's current condition, including their PMH, surgical history, social and family history. Reviewed the nursing notes and vital signs from today's visit. Nursing notes will be reviewed as they become available in realtime while the pt has been in the ED. In addition, I read most recent discharge summaries, if available and reviewed prior ECGs or imaging studies for comparison purposes. Lisa Ho MD Msc    Vital Signs-Reviewed the patient's vital signs.   Patient Vitals for the past 24 hrs:   Temp Pulse Resp BP SpO2   06/25/21 0009 98 °F (36.7 °C) 75 18 125/82 100 %         Provider Notes (Medical Decision Making):     Patient presents with left eye swelling, redness, itching, discomfort and on exam has a left lower eyelid hordeolum. No trauma to the eye. PERRLA, eomi, no pain with eye movements, vision intact, conjunctiva is normal.  I am concerned about possible early preorbital cellulitis given the erythema. No concern for orbital cellulitis. Start po abx, reviewed application and frequency of compresses, benadryl for pruritis. Follow up with ophthalmology if not better in the next 4-5 days. Strict return precautions reviewed in detail. ED Course:   Initial assessment performed. The patients presenting problems have been discussed, and they are in agreement with the care plan formulated and outlined with them. I have encouraged them to ask questions as they arise throughout their visit. Progress note:  Patient has been reassessed and reports feeling considerably better, has normal vital signs and feels comfortable going home. I think this is reasonable as no findings today suggest a life-threatening condition. DISPOSITION: DISCHARGE  The patient's results have been reviewed with patient and available family and/or caregiver. They verbally convey their understanding and agreement of the patient's signs, symptoms, diagnosis, treatment and prognosis and additionally agree to follow up as recommended in the discharge instructions or to return to the Emergency Department should the patient's condition change prior to their follow-up appointment. The patient and available family and/or caregiver verbally agree with the care plan and all of their questions have been answered. The discharge instructions have also been provided to the them with educational information regarding the patient's diagnosis as well a list of reasons why the patient would want to return to the ER prior to their follow-up appointment should any concerns arise, the patient's condition change or symptoms worsen.     Mahsa Beavers MD, Msc    PLAN:  Discharge Medication List as of 6/25/2021  1:45 AM      START taking these medications    Details   clindamycin (CLEOCIN) 300 mg capsule Take 1 Capsule by mouth three (3) times daily for 5 days. , Normal, Disp-15 Capsule, R-0      cefdinir (OMNICEF) 300 mg capsule Take 1 Capsule by mouth two (2) times a day for 5 days. , Normal, Disp-10 Capsule, R-0      diphenhydrAMINE (BenadryL) 25 mg capsule Take 2 Capsules by mouth every six (6) hours as needed for Itching for up to 5 days. , Normal, Disp-20 Capsule, R-0         CONTINUE these medications which have NOT CHANGED    Details   erythromycin (ILOTYCIN) ophthalmic ointment Apply to the left lower eyelid 3-4 times daily. , Normal, Disp-3.5 g, R-0         1.   2.     Follow-up Information     Follow up With Specialties Details Why Contact Info    [facility]  Schedule an appointment as soon as possible for a visit in 1 week if not improved [address] [facility] [address]   [phone]    Mickie Edmondson MD Family Medicine Schedule an appointment as soon as possible for a visit in 3 days for reevaluation [address] [phone]  [phone]      [facility] EMERGENCY DEPT Emergency Medicine  As needed, If symptoms worsen [address] [place] [address] Drive  [address] [facility]  [phone]        3. Return to ED if worse       INancy MD, am the attending of record for this patient encounter. Diagnosis     Clinical Impression:   1. Hordeolum externum left lower eyelid    2. Periorbital cellulitis of left eye        Attestation:  I personally performed the services described in this documentation on this date 6/25/2021 for patient Jade Morris.   Guera Valadez MD

## 2021-07-09 ENCOUNTER — APPOINTMENT (OUTPATIENT)
Dept: GENERAL RADIOLOGY | Age: 26
End: 2021-07-09
Attending: PHYSICIAN ASSISTANT
Payer: MEDICAID

## 2021-07-09 ENCOUNTER — HOSPITAL ENCOUNTER (EMERGENCY)
Age: 26
Discharge: HOME | End: 2021-07-09
Attending: EMERGENCY MEDICINE
Payer: MEDICAID

## 2021-07-09 VITALS
WEIGHT: 140 LBS | HEART RATE: 103 BPM | OXYGEN SATURATION: 100 % | HEIGHT: 67 IN | DIASTOLIC BLOOD PRESSURE: 62 MMHG | TEMPERATURE: 98.5 F | SYSTOLIC BLOOD PRESSURE: 127 MMHG | BODY MASS INDEX: 21.97 KG/M2 | RESPIRATION RATE: 18 BRPM

## 2021-07-09 DIAGNOSIS — S82.65XA CLOSED NONDISPLACED FRACTURE OF LATERAL MALLEOLUS OF LEFT FIBULA, INITIAL ENCOUNTER: ICD-10-CM

## 2021-07-09 DIAGNOSIS — S82.55XA CLOSED NONDISPLACED FRACTURE OF MEDIAL MALLEOLUS OF LEFT TIBIA, INITIAL ENCOUNTER: Primary | ICD-10-CM

## 2021-07-09 PROCEDURE — 74011250637 HC RX REV CODE- 250/637: Performed by: PHYSICIAN ASSISTANT

## 2021-07-09 PROCEDURE — 99284 EMERGENCY DEPT VISIT MOD MDM: CPT

## 2021-07-09 PROCEDURE — 73610 X-RAY EXAM OF ANKLE: CPT

## 2021-07-09 PROCEDURE — 75810000053 HC SPLINT APPLICATION

## 2021-07-09 RX ORDER — HYDROCODONE BITARTRATE AND ACETAMINOPHEN 5; 325 MG/1; MG/1
1 TABLET ORAL ONCE
Status: COMPLETED | OUTPATIENT
Start: 2021-07-09 | End: 2021-07-09

## 2021-07-09 RX ORDER — IBUPROFEN 800 MG/1
800 TABLET ORAL
Qty: 20 TABLET | Refills: 0 | Status: SHIPPED | OUTPATIENT
Start: 2021-07-09 | End: 2021-07-16

## 2021-07-09 RX ORDER — HYDROCODONE BITARTRATE AND ACETAMINOPHEN 5; 325 MG/1; MG/1
1 TABLET ORAL
Qty: 12 TABLET | Refills: 0 | Status: SHIPPED | OUTPATIENT
Start: 2021-07-09 | End: 2021-07-12

## 2021-07-09 RX ADMIN — HYDROCODONE BITARTRATE AND ACETAMINOPHEN 1 TABLET: 5; 325 TABLET ORAL at 14:42

## 2021-07-09 NOTE — ED TRIAGE NOTES
Patient presents to ED with c/o left ankle pain after steel metal hit ankle while at work. Patient noted to have obvious swelling to left ankle.

## 2021-07-09 NOTE — LETTER
[facility] EMERGENCY DEPT  [facility] [facility] [address] [phone]    Work/School Note    Date: 7/9/2021    To Whom It May concern:    Jeremy Vieira was seen and treated today in the emergency room by the following provider(s):  Attending Provider: Laura Nick MD  Physician Assistant: Srinath Arreaga PA-C. Jeremy Vieira should not return to work until cleared by orthopedic physician.     Sincerely,          Felipe Walter PA-C

## 2021-07-09 NOTE — ED PROVIDER NOTES
EMERGENCY DEPARTMENT HISTORY AND PHYSICAL EXAM    Date: 7/9/2021  Patient Name: Carolyn Padilla    History of Presenting Illness     Chief Complaint   Patient presents with    Ankle Injury         History Provided By: Patient    HPI: Carolyn Padilla is a 27 y.o. female with No significant past medical history who presents with L ankle pain after a steel plate fell on her ankle and crushed it at work today. Pt was not ambulatory after incident and rates pain 10/10. Pt has not taken anything for symptoms PTA. There are no alleviating factors reported    PCP: Juana Cao MD    Current Outpatient Medications   Medication Sig Dispense Refill    HYDROcodone-acetaminophen (Norco) 5-325 mg per tablet Take 1 Tablet by mouth every six (6) hours as needed for Pain for up to 3 days. Max Daily Amount: 4 Tablets. 12 Tablet 0    ibuprofen (MOTRIN) 800 mg tablet Take 1 Tablet by mouth every eight (8) hours as needed for Pain for up to 7 days. 20 Tablet 0       Past History     Past Medical History:  Past Medical History:   Diagnosis Date    Asthma        Past Surgical History:  Past Surgical History:   Procedure Laterality Date    HX ACL RECONSTRUCTION Left 2012    HX ORTHOPAEDIC  2012    left ACL repaired       Family History:  Family History   Problem Relation Age of Onset    No Known Problems Mother     Cancer Father     No Known Problems Sister     No Known Problems Brother     Cancer Maternal Grandmother     Cancer Maternal Grandfather     Cancer Paternal Grandmother     Cancer Paternal Grandfather        Social History:  Social History     Tobacco Use    Smoking status: Never Smoker    Smokeless tobacco: Never Used   Substance Use Topics    Alcohol use: No    Drug use: No       Allergies:   Allergies   Allergen Reactions    Latex Other (comments)     Skin irritation    Amoxicillin Itching    Pcn [Penicillins] Hives    Shellfish Derived Itching         Review of Systems   Review of Systems   Constitutional: Negative for chills and fever. Musculoskeletal: Positive for arthralgias, gait problem and joint swelling. Skin: Positive for wound. Allergic/Immunologic: Negative for immunocompromised state. Neurological: Negative for speech difficulty and weakness. Psychiatric/Behavioral: Negative for self-injury. All other systems reviewed and are negative. Physical Exam     Vitals:    07/09/21 1421   BP: 127/62   Pulse: (!) 103   Resp: 18   Temp: 98.5 °F (36.9 °C)   SpO2: 100%   Weight: 63.5 kg (140 lb)   Height: 5' 7\" (1.702 m)     Physical Exam  Vitals and nursing note reviewed. Constitutional:       General: She is not in acute distress. Appearance: She is well-developed. HENT:      Head: Normocephalic and atraumatic. Eyes:      Conjunctiva/sclera: Conjunctivae normal.   Cardiovascular:      Rate and Rhythm: Normal rate and regular rhythm. Heart sounds: Normal heart sounds. Pulmonary:      Effort: Pulmonary effort is normal. No respiratory distress. Breath sounds: Normal breath sounds. No wheezing or rales. Musculoskeletal:      Left ankle: Swelling (significant swelling noted to the L lateral malleolus) present. No deformity or ecchymosis. Tenderness present over the lateral malleolus (minor abrasions noted  ) and medial malleolus (minor abrasion noted). Decreased range of motion. Normal pulse. Skin:     General: Skin is warm and dry. Neurological:      Mental Status: She is alert and oriented to person, place, and time. Psychiatric:         Behavior: Behavior normal.         Thought Content: Thought content normal.         Judgment: Judgment normal.           Diagnostic Study Results     Labs -   No results found for this or any previous visit (from the past 12 hour(s)).     Radiologic Studies -   XR ANKLE LT MIN 3 V   Final Result   Acute nondisplaced medial malleoli fractures and suspected buckle   fracture of the lateral malleolus with associated soft tissue swelling. CT Results  (Last 48 hours)    None        CXR Results  (Last 48 hours)    None            Medical Decision Making   I am the first provider for this patient. I reviewed the vital signs, available nursing notes, past medical history, past surgical history, family history and social history. Vital Signs-Reviewed the patient's vital signs. Records Reviewed: Nursing Notes and Old Medical Records    Provider Notes (Medical Decision Making):   Patient presents with L ankle pain after trauma. DDx: dislocation, fracture, contusion. Will give analgesics and get xrays. Neurovasularly intact. ED Course as of Jul 09 1607 Fri Jul 09, 2021   1516 I spoke with Dr. Merline Lone, Consult for Podiatry. Discussed available diagnostic tests and clinical findings. She reviewed xrays herself as well and advised and agrees with sugar tong and posterior splinting, lots of padding, non weight bearing and to call her office on Monday for follow up in [place] on tues or [facility] on Wed  Ilana Barnes PA-C      []      ED Course User Index  [AH] Keri Diaz PA-C      Definitive fracture care:  Medial Malleolar fracture and buckle fracture of Lateral malleolar of left ankle Non displaced on medial side. PLAN:Immobilization with short leg and sugar tong orthoglass splint, +NVI, crutches given, analgesics, ortho follow up on Tues or Wed next week      Disposition:  Discharged    DISCHARGE NOTE:   4:07 PM      Care plan outlined and precautions discussed. Patient has no new complaints, changes, or physical findings. Results of xrays were reviewed with the patient. All medications were reviewed with the patient; will d/c home with pain meds. All of pt's questions and concerns were addressed. Patient was instructed and agrees to follow up with ortho, as well as to return to the ED upon further deterioration. Patient is ready to go home.     Follow-up Information     Follow up With Specialties Details Why Contact Info    Bro Lugo DPM Podiatry Call on 7/12/2021 for an appointment on Tues or Wed [address]  [phone]            Current Discharge Medication List      START taking these medications    Details   HYDROcodone-acetaminophen (Norco) 5-325 mg per tablet Take 1 Tablet by mouth every six (6) hours as needed for Pain for up to 3 days. Max Daily Amount: 4 Tablets. Qty: 12 Tablet, Refills: 0  Start date: 7/9/2021, End date: 7/12/2021    Associated Diagnoses: Closed nondisplaced fracture of medial malleolus of left tibia, initial encounter; Closed nondisplaced fracture of lateral malleolus of left fibula, initial encounter      ibuprofen (MOTRIN) 800 mg tablet Take 1 Tablet by mouth every eight (8) hours as needed for Pain for up to 7 days. Qty: 20 Tablet, Refills: 0  Start date: 7/9/2021, End date: 7/16/2021             Procedures:  APPLY SHORT LEG SPLINT    Date/Time: 7/9/2021 4:06 PM  Performed by: Patrice Fuentes PA-C  Authorized by: Patrice Fuentes PA-C     Consent:     Consent obtained:  Verbal    Consent given by:  Patient    Risks discussed:  Pain and swelling  Pre-procedure details:     Sensation:  Normal  Procedure details:     Laterality:  Left    Location:  Ankle    Ankle:  L ankle    Cast type:  Short leg    Splint type:  Sugar tong and short leg    Supplies:  Cotton padding, Ortho-Glass and elastic bandage  Post-procedure details:     Pain:  Unchanged    Sensation:  Normal    Patient tolerance of procedure: Tolerated well, no immediate complications        Please note that this dictation was completed with Dragon, computer voice recognition software. Quite often unanticipated grammatical, syntax, homophones, and other interpretive errors are inadvertently transcribed by the computer software. Please disregard these errors. Additionally, please excuse any errors that have escaped final proofreading.     Diagnosis Clinical Impression:   1. Closed nondisplaced fracture of medial malleolus of left tibia, initial encounter    2.  Closed nondisplaced fracture of lateral malleolus of left fibula, initial encounter

## 2021-07-09 NOTE — ED NOTES
Pt presents to ED ambulatory complaining of left ankle pain x earlier today. Pt reports she was at work when two steel bars squished her left ankle. Pt's left ankle noted to be swollen. Pt reports 10/10 pain for her ankle. Pt is alert and oriented x 4, RR even and unlabored, skin is warm and dry. Assessment completed and pt updated on plan of care. Call bell in reach. Emergency Department Nursing Plan of Care       The Nursing Plan of Care is developed from the Nursing assessment and Emergency Department Attending provider initial evaluation. The plan of care may be reviewed in the ED Provider note.     The Plan of Care was developed with the following considerations:   Patient / Family readiness to learn indicated by:verbalized understanding  Persons(s) to be included in education: patient  Barriers to Learning/Limitations:No    Signed     Jaida Acosta RN    7/9/2021   2:35 PM

## 2021-07-09 NOTE — ED NOTES
Discharge instructions were given to the patient by Colleen Judge RN. The patient left the Emergency Department via wheelchair, alert and oriented and in no acute distress with 2 prescriptions. The patient was encouraged to call or return to the ED for worsening issues or problems and was encouraged to schedule a follow up appointment for continuing care. The patient verbalized understanding of discharge instructions and prescriptions, all questions were answered. The patient has no further concerns at this time.

## 2021-08-05 ENCOUNTER — HOSPITAL ENCOUNTER (EMERGENCY)
Age: 26
Discharge: LEFT BEFORE DISCHARGE | End: 2021-08-06
Payer: COMMERCIAL

## 2021-08-05 PROCEDURE — 75810000275 HC EMERGENCY DEPT VISIT NO LEVEL OF CARE

## 2021-08-06 ENCOUNTER — HOSPITAL ENCOUNTER (EMERGENCY)
Age: 26
Discharge: HOME | End: 2021-08-06
Attending: EMERGENCY MEDICINE
Payer: COMMERCIAL

## 2021-08-06 VITALS
SYSTOLIC BLOOD PRESSURE: 115 MMHG | HEIGHT: 68 IN | BODY MASS INDEX: 19.7 KG/M2 | TEMPERATURE: 98.2 F | WEIGHT: 130 LBS | HEART RATE: 63 BPM | OXYGEN SATURATION: 100 % | RESPIRATION RATE: 15 BRPM | DIASTOLIC BLOOD PRESSURE: 65 MMHG

## 2021-08-06 VITALS
SYSTOLIC BLOOD PRESSURE: 110 MMHG | OXYGEN SATURATION: 100 % | TEMPERATURE: 98.8 F | HEIGHT: 68 IN | HEART RATE: 75 BPM | WEIGHT: 146.61 LBS | BODY MASS INDEX: 22.22 KG/M2 | RESPIRATION RATE: 14 BRPM | DIASTOLIC BLOOD PRESSURE: 72 MMHG

## 2021-08-06 DIAGNOSIS — H57.89 IRRITATION OF RIGHT EYE: Primary | ICD-10-CM

## 2021-08-06 PROCEDURE — 74011000250 HC RX REV CODE- 250: Performed by: PHYSICIAN ASSISTANT

## 2021-08-06 PROCEDURE — 99282 EMERGENCY DEPT VISIT SF MDM: CPT

## 2021-08-06 RX ORDER — ERYTHROMYCIN 5 MG/G
OINTMENT OPHTHALMIC
Qty: 3.5 G | Refills: 0 | Status: SHIPPED | OUTPATIENT
Start: 2021-08-06 | End: 2021-08-13

## 2021-08-06 RX ORDER — TETRACAINE HYDROCHLORIDE 5 MG/ML
1 SOLUTION OPHTHALMIC
Status: COMPLETED | OUTPATIENT
Start: 2021-08-06 | End: 2021-08-06

## 2021-08-06 RX ADMIN — TETRACAINE HYDROCHLORIDE 1 DROP: 5 SOLUTION OPHTHALMIC at 15:37

## 2021-08-06 RX ADMIN — FLUORESCEIN SODIUM 1 STRIP: 1 STRIP OPHTHALMIC at 15:37

## 2021-08-06 NOTE — ED PROVIDER NOTES
EMERGENCY DEPARTMENT HISTORY AND PHYSICAL EXAM      Date: 8/6/2021  Patient Name: Ernesto Ordonez    History of Presenting Illness     Chief Complaint   Patient presents with    Eye Pain     pain inside left eye swelling since yesterday; feels like something in it. noted swelling of the conjunctiva       History Provided By: Patient    HPI: Ernesto Ordonez, 27 y.o. female with a history of asthma presents ambulatory to the ED with cc of 2 days of 8 out of 10 constant, aching and irritated pain of the right eye that is worse with blinking. Shows me she feels like something is in it. 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 She recently has a foot injury and has been on light duty and has not been performing any of those activities or has not been in close physical proximity to those activities. She tells me she did have an eye exam earlier this year and is supposed to have eyeglasses, but does not wear them and does not wear contact lenses. She tells me her vision is clear and denies any blurry vision or visual changes. She has been well lately without fever. She tells me she has a penicillin and shellfish allergy and takes no medications daily but for some ibuprofen for her recent foot injury. There are no other complaints, changes, or physical findings at this time.     PCP: Courtney Scott MD      Past History     Past Medical History:  Past Medical History:   Diagnosis Date    Asthma        Past Surgical History:  Past Surgical History:   Procedure Laterality Date    HX ACL RECONSTRUCTION Left 2012    HX ORTHOPAEDIC  2012    left ACL repaired       Family History:  Family History   Problem Relation Age of Onset    No Known Problems Mother     Cancer Father     No Known Problems Sister     No Known Problems Brother     Cancer Maternal Grandmother     Cancer Maternal Grandfather     Cancer Paternal Grandmother  Cancer Paternal Grandfather        Social History:  Social History     Tobacco Use    Smoking status: Never Smoker    Smokeless tobacco: Never Used   Substance Use Topics    Alcohol use: No    Drug use: No       Allergies: Allergies   Allergen Reactions    Latex Other (comments)     Skin irritation    Amoxicillin Itching    Pcn [Penicillins] Hives    Shellfish Derived Itching     Review of Systems   Review of Systems   Constitutional: Negative for fatigue and fever. HENT: Negative for ear pain and sore throat. Eyes: Positive for pain. Negative for redness and visual disturbance. Respiratory: Negative for cough and shortness of breath. Cardiovascular: Negative for chest pain and palpitations. Gastrointestinal: Negative for abdominal pain, nausea and vomiting. Genitourinary: Negative for dysuria, frequency and urgency. Musculoskeletal: Negative for back pain, gait problem, neck pain and neck stiffness. Skin: Negative for rash and wound. Neurological: Negative for dizziness, weakness, light-headedness, numbness and headaches. Physical Exam   Physical Exam  Vitals and nursing note reviewed. Constitutional:       General: She is not in acute distress. Appearance: She is well-developed. She is not toxic-appearing. HENT:      Head: Normocephalic and atraumatic. Jaw: No trismus. Right Ear: External ear normal.      Left Ear: External ear normal.      Nose: Nose normal.      Mouth/Throat:      Pharynx: Uvula midline. Eyes:      General: Lids are normal. Lids are everted, no foreign bodies appreciated. Vision grossly intact. Gaze aligned appropriately. No scleral icterus. Extraocular Movements: Extraocular movements intact. Conjunctiva/sclera: Conjunctivae normal.      Pupils: Pupils are equal, round, and reactive to light.         Comments:   RIGHT EYE:  No periorbital edema or erythema  There is a small area of mild hyperemia of the lateral aspect overlying the sclera that correlates with her sensation of pain and irritation. Remainder of sclera is white without icterus or injection. Eversion of lids yields no visible foreign body. EOMI. PERRL. Cardiovascular:      Rate and Rhythm: Normal rate and regular rhythm. Pulmonary:      Effort: Pulmonary effort is normal. No tachypnea, accessory muscle usage or respiratory distress. Breath sounds: No decreased breath sounds or wheezing. Abdominal:      Palpations: Abdomen is soft. Tenderness: There is no abdominal tenderness. Musculoskeletal:         General: Normal range of motion. Cervical back: Full passive range of motion without pain and normal range of motion. Skin:     Findings: No rash. Neurological:      Mental Status: She is alert and oriented to person, place, and time. She is not disoriented. GCS: GCS eye subscore is 4. GCS verbal subscore is 5. GCS motor subscore is 6. Cranial Nerves: No cranial nerve deficit. Psychiatric:         Speech: Speech normal.       Diagnostic Study Results     Labs -   No results found for this or any previous visit (from the past 12 hour(s)). Radiologic Studies -   No orders to display     CT Results  (Last 48 hours)    None        CXR Results  (Last 48 hours)    None        Medical Decision Making   I am the first provider for this patient. I reviewed the vital signs, available nursing notes, past medical history, past surgical history, family history and social history. Vital Signs-Reviewed the patient's vital signs.   Patient Vitals for the past 12 hrs:   Temp Pulse Resp BP SpO2   08/06/21 1523 98.8 °F (37.1 °C) 75 14 110/72 100 %       Pulse Oximetry Analysis - 100% on RA    Records Reviewed: Nursing Notes, Old Medical Records, Previous Radiology Studies, Previous Laboratory Studies and [facility]    Provider Notes (Medical Decision Making):   DDx: Corneal abrasion, foreign body, bacterial keratitis, corneal ulcer    Procedure Note - Wood's lamp exam:  5:51 PM  Performed by: Josh Boyd PA-C  Pts right eye was anesthetized with tetracaine, stained with fluorescein, and examined with a Wood's lamp, using lid eversion. Foreign body: no  Fluorescein uptake: yes, showing mild uptake of the lateral aspect overlying the sclera without ulceration. There is no corneal involvement. Suzanna negative. The procedure took 1-15 minutes, and pt tolerated well. ED Course:   Initial assessment performed. The patients presenting problems have been discussed, and they are in agreement with the care plan formulated and outlined with them. I have encouraged them to ask questions as they arise throughout their visit. Disposition:  Discharge    PLAN:  1. Discharge Medication List as of 8/6/2021  5:56 PM        2. Follow-up Information     Follow up With Specialties Details Why Contact Info    [facility]  Call  OPHTHALMOLOGY: as needed if symptoms persist [id number] [address]  [phone]        Return to ED if worse     Diagnosis     Clinical Impression:   1.  Irritation of right eye

## 2021-08-06 NOTE — ED NOTES
Apurva JONES reviewed discharge instructions with the patient. The patient verbalized understanding. All questions and concerns were addressed. The patient declined a wheelchair and is discharged ambulatory in the care of family members with instructions and prescriptions in hand. Pt is alert and oriented x 4. Respirations are clear and unlabored.

## 2021-08-06 NOTE — ED TRIAGE NOTES
Pt reporting right eye pain x this AM when waking up. Feels like foreign object is in eye. Does not wear contacts.

## 2021-10-15 ENCOUNTER — HOSPITAL ENCOUNTER (EMERGENCY)
Age: 26
Discharge: HOME | End: 2021-10-15
Attending: EMERGENCY MEDICINE
Payer: COMMERCIAL

## 2021-10-15 ENCOUNTER — APPOINTMENT (OUTPATIENT)
Dept: GENERAL RADIOLOGY | Age: 26
End: 2021-10-15
Attending: EMERGENCY MEDICINE
Payer: COMMERCIAL

## 2021-10-15 VITALS
TEMPERATURE: 97.6 F | HEIGHT: 66 IN | OXYGEN SATURATION: 100 % | WEIGHT: 147.05 LBS | RESPIRATION RATE: 16 BRPM | SYSTOLIC BLOOD PRESSURE: 120 MMHG | DIASTOLIC BLOOD PRESSURE: 104 MMHG | BODY MASS INDEX: 23.63 KG/M2 | HEART RATE: 71 BPM

## 2021-10-15 DIAGNOSIS — J45.21 MILD INTERMITTENT ASTHMA WITH ACUTE EXACERBATION: Primary | ICD-10-CM

## 2021-10-15 LAB
ALBUMIN SERPL-MCNC: 4.1 G/DL (ref 3.5–5)
ALBUMIN/GLOB SERPL: 1 {RATIO} (ref 1.1–2.2)
ALP SERPL-CCNC: 79 U/L (ref 45–117)
ALT SERPL-CCNC: 19 U/L (ref 12–78)
ANION GAP SERPL CALC-SCNC: 3 MMOL/L (ref 5–15)
AST SERPL-CCNC: 19 U/L (ref 15–37)
ATRIAL RATE: 78 BPM
BASOPHILS # BLD: 0 K/UL (ref 0–0.1)
BASOPHILS NFR BLD: 0 % (ref 0–1)
BILIRUB SERPL-MCNC: 0.4 MG/DL (ref 0.2–1)
BUN SERPL-MCNC: 12 MG/DL (ref 6–20)
BUN/CREAT SERPL: 13 (ref 12–20)
CALCIUM SERPL-MCNC: 9.6 MG/DL (ref 8.5–10.1)
CALCULATED P AXIS, ECG09: 69 DEGREES
CALCULATED R AXIS, ECG10: 69 DEGREES
CALCULATED T AXIS, ECG11: 30 DEGREES
CHLORIDE SERPL-SCNC: 105 MMOL/L (ref 97–108)
CO2 SERPL-SCNC: 28 MMOL/L (ref 21–32)
CREAT SERPL-MCNC: 0.9 MG/DL (ref 0.55–1.02)
D DIMER PPP FEU-MCNC: 0.32 MG/L FEU (ref 0–0.65)
DIAGNOSIS, 93000: NORMAL
DIFFERENTIAL METHOD BLD: NORMAL
EOSINOPHIL # BLD: 0.1 K/UL (ref 0–0.4)
EOSINOPHIL NFR BLD: 1 % (ref 0–7)
ERYTHROCYTE [DISTWIDTH] IN BLOOD BY AUTOMATED COUNT: 13.3 % (ref 11.5–14.5)
GLOBULIN SER CALC-MCNC: 4 G/DL (ref 2–4)
GLUCOSE SERPL-MCNC: 82 MG/DL (ref 65–100)
HCT VFR BLD AUTO: 42.3 % (ref 35–47)
HGB BLD-MCNC: 13.6 G/DL (ref 11.5–16)
IMM GRANULOCYTES # BLD AUTO: 0 K/UL (ref 0–0.04)
IMM GRANULOCYTES NFR BLD AUTO: 0 % (ref 0–0.5)
LYMPHOCYTES # BLD: 2.1 K/UL (ref 0.8–3.5)
LYMPHOCYTES NFR BLD: 25 % (ref 12–49)
MCH RBC QN AUTO: 29.6 PG (ref 26–34)
MCHC RBC AUTO-ENTMCNC: 32.2 G/DL (ref 30–36.5)
MCV RBC AUTO: 92 FL (ref 80–99)
MONOCYTES # BLD: 0.6 K/UL (ref 0–1)
MONOCYTES NFR BLD: 7 % (ref 5–13)
NEUTS SEG # BLD: 5.6 K/UL (ref 1.8–8)
NEUTS SEG NFR BLD: 66 % (ref 32–75)
NRBC # BLD: 0 K/UL (ref 0–0.01)
NRBC BLD-RTO: 0 PER 100 WBC
P-R INTERVAL, ECG05: 138 MS
PLATELET # BLD AUTO: 316 K/UL (ref 150–400)
PMV BLD AUTO: 10.3 FL (ref 8.9–12.9)
POTASSIUM SERPL-SCNC: 3.6 MMOL/L (ref 3.5–5.1)
PROT SERPL-MCNC: 8.1 G/DL (ref 6.4–8.2)
Q-T INTERVAL, ECG07: 354 MS
QRS DURATION, ECG06: 74 MS
QTC CALCULATION (BEZET), ECG08: 403 MS
RBC # BLD AUTO: 4.6 M/UL (ref 3.8–5.2)
SODIUM SERPL-SCNC: 136 MMOL/L (ref 136–145)
TROPONIN-HIGH SENSITIVITY: <4 NG/L (ref 0–51)
VENTRICULAR RATE, ECG03: 78 BPM
WBC # BLD AUTO: 8.5 K/UL (ref 3.6–11)

## 2021-10-15 PROCEDURE — 85025 COMPLETE CBC W/AUTO DIFF WBC: CPT

## 2021-10-15 PROCEDURE — 93005 ELECTROCARDIOGRAM TRACING: CPT

## 2021-10-15 PROCEDURE — 80053 COMPREHEN METABOLIC PANEL: CPT

## 2021-10-15 PROCEDURE — 71045 X-RAY EXAM CHEST 1 VIEW: CPT

## 2021-10-15 PROCEDURE — 85379 FIBRIN DEGRADATION QUANT: CPT

## 2021-10-15 PROCEDURE — 96374 THER/PROPH/DIAG INJ IV PUSH: CPT

## 2021-10-15 PROCEDURE — 74011250637 HC RX REV CODE- 250/637: Performed by: EMERGENCY MEDICINE

## 2021-10-15 PROCEDURE — 36415 COLL VENOUS BLD VENIPUNCTURE: CPT

## 2021-10-15 PROCEDURE — 84484 ASSAY OF TROPONIN QUANT: CPT

## 2021-10-15 PROCEDURE — 74011250636 HC RX REV CODE- 250/636: Performed by: EMERGENCY MEDICINE

## 2021-10-15 PROCEDURE — 99283 EMERGENCY DEPT VISIT LOW MDM: CPT

## 2021-10-15 RX ORDER — DEXAMETHASONE SODIUM PHOSPHATE 4 MG/ML
10 INJECTION, SOLUTION INTRA-ARTICULAR; INTRALESIONAL; INTRAMUSCULAR; INTRAVENOUS; SOFT TISSUE
Status: COMPLETED | OUTPATIENT
Start: 2021-10-15 | End: 2021-10-15

## 2021-10-15 RX ORDER — ACETAMINOPHEN 325 MG/1
650 TABLET ORAL ONCE
Status: COMPLETED | OUTPATIENT
Start: 2021-10-15 | End: 2021-10-15

## 2021-10-15 RX ORDER — SODIUM CHLORIDE 0.9 % (FLUSH) 0.9 %
5-40 SYRINGE (ML) INJECTION EVERY 8 HOURS
Status: DISCONTINUED | OUTPATIENT
Start: 2021-10-15 | End: 2021-10-15 | Stop reason: HOSPADM

## 2021-10-15 RX ORDER — ALBUTEROL SULFATE 90 UG/1
2 AEROSOL, METERED RESPIRATORY (INHALATION)
Qty: 2 EACH | Refills: 0 | Status: SHIPPED | OUTPATIENT
Start: 2021-10-15 | End: 2021-10-16

## 2021-10-15 RX ORDER — SODIUM CHLORIDE 0.9 % (FLUSH) 0.9 %
5-40 SYRINGE (ML) INJECTION AS NEEDED
Status: DISCONTINUED | OUTPATIENT
Start: 2021-10-15 | End: 2021-10-15 | Stop reason: HOSPADM

## 2021-10-15 RX ADMIN — DEXAMETHASONE SODIUM PHOSPHATE 10 MG: 4 INJECTION, SOLUTION INTRAMUSCULAR; INTRAVENOUS at 11:04

## 2021-10-15 RX ADMIN — ACETAMINOPHEN 650 MG: 325 TABLET ORAL at 11:03

## 2021-10-15 NOTE — ED PROVIDER NOTES
EMERGENCY DEPARTMENT HISTORY AND PHYSICAL EXAM      Date: 10/15/2021  Patient Name: Noreen Oseguera  Patient Age and Sex: 27 y.o. female     History of Presenting Illness     Chief Complaint   Patient presents with    Chest Pain     states intermittent chest painand SOB radiating down her left arm. NK medical hx. History Provided By: Patient    HPI: Noreen Oseguera is a 27year old history asthma presenting with chest pain and dyspnea. She reports she developed intermittent episodes of chest pain since last night. Sharp pain which lasts seconds at a time, radiating down her left arm. She describes some intermittent numbness tingling to her left hand. Pain is mildly worse with breathing. Ongoing mild dyspnea. Tried inhaler last night, made her feel somewhat better, helped breathing. No obvious inciting flares. She plays with 3year old son, maybe strained muscle. No oral contraceptive use. No leg swelling hemoptysis personal or family history of DVT or VTE. Last asthma flare was 89 years old, does not use albuterol inhaler regularly. There are no other complaints, changes, or physical findings at this time. PCP: Guanakito Butts MD    No current facility-administered medications on file prior to encounter. No current outpatient medications on file prior to encounter.        Past History     Past Medical History:  Past Medical History:   Diagnosis Date    Asthma      Past Surgical History:  Past Surgical History:   Procedure Laterality Date    HX ACL RECONSTRUCTION Left 2012    HX ORTHOPAEDIC  2012    left ACL repaired     Family History:  Family History   Problem Relation Age of Onset    No Known Problems Mother     Cancer Father     No Known Problems Sister     No Known Problems Brother     Cancer Maternal Grandmother     Cancer Maternal Grandfather     Cancer Paternal Grandmother     Cancer Paternal Grandfather        Social History:  Social History     Tobacco Use    Smoking status: Never Smoker    Smokeless tobacco: Never Used   Substance Use Topics    Alcohol use: No    Drug use: No       Allergies: Allergies   Allergen Reactions    Latex Other (comments)     Skin irritation    Amoxicillin Itching    Pcn [Penicillins] Hives    Shellfish Derived Itching         Review of Systems   Review of Systems   Respiratory: Positive for shortness of breath. Cardiovascular: Positive for chest pain. All other systems reviewed and are negative. Physical Exam   Physical Exam   General: Alert, no acute distress  Skin: Warm, dry  Head: Normocephalic, atraumatic  Eye: EOMI, normal conjunctiva  Ears, Nose, Mouth and Throat: Oral mucosa moist, no pharyngeal erythema or exudate  Cardiovascular: No murmur, normal peripheral perfusion, regular rhythm  Pulmonary: Normal respiratory effort, normal breath sounds  Gastrointestinal: Soft, nontender, nondistended  Musculoskeletal: No swelling, no deformity  Neurological: Alert and oriented to person, place, situation. Normal speech observed. Moving all extremities symmetrically.   Psychiatric: Cooperative, appropriate affect    Diagnostic Study Results     Labs -     Recent Results (from the past 12 hour(s))   EKG, 12 LEAD, INITIAL    Collection Time: 10/15/21  9:56 AM   Result Value Ref Range    Ventricular Rate 78 BPM    Atrial Rate 78 BPM    P-R Interval 138 ms    QRS Duration 74 ms    Q-T Interval 354 ms    QTC Calculation (Bezet) 403 ms    Calculated P Axis 69 degrees    Calculated R Axis 69 degrees    Calculated T Axis 30 degrees    Diagnosis       Normal sinus rhythm  Normal ekg  Confirmed by Jenny Bolaños ([id number]) on 10/15/2021 3:10:29 PM     CBC WITH AUTOMATED DIFF    Collection Time: 10/15/21 10:00 AM   Result Value Ref Range    WBC 8.5 3.6 - 11.0 K/uL    RBC 4.60 3.80 - 5.20 M/uL    HGB 13.6 11.5 - 16.0 g/dL    HCT 42.3 35.0 - 47.0 %    MCV 92.0 80.0 - 99.0 FL    MCH 29.6 26.0 - 34.0 PG    MCHC 32.2 30.0 - 36.5 g/dL    RDW 13.3 11.5 - 14.5 %    PLATELET 645 192 - 619 K/uL    MPV 10.3 8.9 - 12.9 FL    NRBC 0.0 0  WBC    ABSOLUTE NRBC 0.00 0.00 - 0.01 K/uL    NEUTROPHILS 66 32 - 75 %    LYMPHOCYTES 25 12 - 49 %    MONOCYTES 7 5 - 13 %    EOSINOPHILS 1 0 - 7 %    BASOPHILS 0 0 - 1 %    IMMATURE GRANULOCYTES 0 0.0 - 0.5 %    ABS. NEUTROPHILS 5.6 1.8 - 8.0 K/UL    ABS. LYMPHOCYTES 2.1 0.8 - 3.5 K/UL    ABS. MONOCYTES 0.6 0.0 - 1.0 K/UL    ABS. EOSINOPHILS 0.1 0.0 - 0.4 K/UL    ABS. BASOPHILS 0.0 0.0 - 0.1 K/UL    ABS. IMM. GRANS. 0.0 0.00 - 0.04 K/UL    DF AUTOMATED     METABOLIC PANEL, COMPREHENSIVE    Collection Time: 10/15/21 10:00 AM   Result Value Ref Range    Sodium 136 136 - 145 mmol/L    Potassium 3.6 3.5 - 5.1 mmol/L    Chloride 105 97 - 108 mmol/L    CO2 28 21 - 32 mmol/L    Anion gap 3 (L) 5 - 15 mmol/L    Glucose 82 65 - 100 mg/dL    BUN 12 6 - 20 MG/DL    Creatinine 0.90 0.55 - 1.02 MG/DL    BUN/Creatinine ratio 13 12 - 20      GFR est AA >60 >60 ml/min/1.73m2    GFR est non-AA >60 >60 ml/min/1.73m2    Calcium 9.6 8.5 - 10.1 MG/DL    Bilirubin, total 0.4 0.2 - 1.0 MG/DL    ALT (SGPT) 19 12 - 78 U/L    AST (SGOT) 19 15 - 37 U/L    Alk.  phosphatase 79 45 - 117 U/L    Protein, total 8.1 6.4 - 8.2 g/dL    Albumin 4.1 3.5 - 5.0 g/dL    Globulin 4.0 2.0 - 4.0 g/dL    A-G Ratio 1.0 (L) 1.1 - 2.2     D DIMER    Collection Time: 10/15/21 10:00 AM   Result Value Ref Range    D-dimer 0.32 0.00 - 0.65 mg/L FEU   TROPONIN-HIGH SENSITIVITY    Collection Time: 10/15/21 10:00 AM   Result Value Ref Range    Troponin-High Sensitivity <4 0 - 51 ng/L       Radiologic Studies -   XR CHEST PORT   Final Result   Normal chest.        CT Results  (Last 48 hours)    None        CXR Results  (Last 48 hours)               10/15/21 1024  XR CHEST PORT Final result    Impression:  Normal chest.       Narrative:  EXAM: XR CHEST PORT       INDICATION: chest pain       COMPARISON: 7/13/2017       FINDINGS: A portable AP radiograph of the chest was obtained at 1022 hours. The   lungs are clear. The cardiac and mediastinal contours and pulmonary vascularity   are normal.  The bones and soft tissues are grossly within normal limits. Medical Decision Making   I am the first provider for this patient. I reviewed the vital signs, available nursing notes, past medical history, past surgical history, family history and social history. Vital Signs-Reviewed the patient's vital signs. Patient Vitals for the past 12 hrs:   Temp Pulse Resp BP SpO2   10/15/21 0946 97.6 °F (36.4 °C) 71 16 (!) 120/104 100 %       Records Reviewed: Nursing Notes and Old Medical Records    Provider Notes (Medical Decision Making):   Differential includes PE, pneumonia, asthma flare, pneumothorax    Patient breathing comfortably on room air not tachypneic or hypoxic, no tachycardia or hypotension. Will obtain EKG, troponin, chest x-ray to evaluate above pathologies. She is low risk for PE given absent risk factors normal vital signs, will obtain D-dimer. No obvious etiology is found, can treat for possible asthma flare though she has no obstructive pattern to breathing, no wheezing. ED Course:   Initial assessment performed. The patients presenting problems have been discussed, and they are in agreement with the care plan formulated and outlined with them. I have encouraged them to ask questions as they arise throughout their visit. Chest x-ray demonstrates no acute cardiopulmonary pathology, no pneumothorax or pneumonia specifically. High-sensitivity troponin is undetectable. D-dimer is 0.3. BMP normal, CBC with normal counts. EKG demonstrates normal sinus rhythm at a rate of 78, normal axis normal intervals with no ischemic changes. Will give dose of steroids, prescription for albuterol inhaler instructions follow-up with PCP for reevaluation, return precautions for worsening dyspnea.        Critical Care Time:   0    Disposition:  Discharge Note:  The patient has been re-evaluated and is ready for discharge. Reviewed available results with patient. Counseled patient on diagnosis and care plan. Patient has expressed understanding, and all questions have been answered. Patient agrees with plan and agrees to follow up as recommended, or to return to the ED if their symptoms worsen. Discharge instructions have been provided and explained to the patient, along with reasons to return to the ED. PLAN:  Discharge Medication List as of 10/15/2021 11:31 AM      START taking these medications    Details   albuterol (PROVENTIL HFA, VENTOLIN HFA, PROAIR HFA) 90 mcg/actuation inhaler Take 2 Puffs by inhalation every four (4) hours as needed for Wheezing for up to 1 day., Normal, Disp-2 Each, R-0           2. Follow-up Information     Follow up With Specialties Details Why Contact Info    daniel Toth MD Family Medicine Schedule an appointment as soon as possible for a visit  As needed, If symptoms worsen [phone]  [phone]          3. Return to ED if worse     Diagnosis     Clinical Impression:   1. Mild intermittent asthma with acute exacerbation        Attestations:    John Pickett M.D. Please note that this dictation was completed with [employer], the computer voice recognition software. Quite often unanticipated grammatical, syntax, homophones, and other interpretive errors are inadvertently transcribed by the computer software. Please disregard these errors. Please excuse any errors that have escaped final proofreading. Thank you.

## 2021-11-23 ENCOUNTER — HOSPITAL ENCOUNTER (EMERGENCY)
Age: 26
Discharge: HOME | End: 2021-11-23
Attending: EMERGENCY MEDICINE
Payer: COMMERCIAL

## 2021-11-23 VITALS
BODY MASS INDEX: 23.49 KG/M2 | DIASTOLIC BLOOD PRESSURE: 82 MMHG | HEART RATE: 73 BPM | SYSTOLIC BLOOD PRESSURE: 113 MMHG | TEMPERATURE: 98.2 F | OXYGEN SATURATION: 100 % | WEIGHT: 154.98 LBS | RESPIRATION RATE: 18 BRPM | HEIGHT: 68 IN

## 2021-11-23 DIAGNOSIS — H00.034 CELLULITIS OF LEFT UPPER EYELID: Primary | ICD-10-CM

## 2021-11-23 PROCEDURE — 99282 EMERGENCY DEPT VISIT SF MDM: CPT

## 2021-11-23 RX ORDER — DIPHENHYDRAMINE HCL 25 MG
25 CAPSULE ORAL
Qty: 21 CAPSULE | Refills: 0 | Status: SHIPPED | OUTPATIENT
Start: 2021-11-23 | End: 2021-12-03

## 2021-11-23 RX ORDER — CLINDAMYCIN HYDROCHLORIDE 300 MG/1
300 CAPSULE ORAL 3 TIMES DAILY
Qty: 21 CAPSULE | Refills: 0 | Status: SHIPPED | OUTPATIENT
Start: 2021-11-23 | End: 2021-11-30

## 2021-11-23 NOTE — LETTER
[address]  [facility] EMERGENCY DEPT  [address] [id number] [phone]    Work/School Note    Date: 11/23/2021    To Whom It May concern:    Rachel Noyola was seen and treated today in the emergency room by the following provider(s):  Attending Provider: Drea Alonzo MD  Physician Assistant: KAREN De La Rosa. Rachel Noyola may return to work on [id number].     Sincerely,          KAREN Rojas

## 2021-11-23 NOTE — ED PROVIDER NOTES
EMERGENCY DEPARTMENT HISTORY AND PHYSICAL EXAM      Date: 11/23/2021  Patient Name: Grayson Tineo    History of Presenting Illness     Chief Complaint   Patient presents with    Eye Pain     Pt reports left outer eye pain x2 days ago. Reports \"a little bit\" of blurry vision. No reported drainage. History Provided By: Patient    HPI: Grayson Tineo, 27 y.o. female of asthma presents ambulatory to the ED with cc of several days of mild, constant and worsening tender redness and swelling to the left upper eyelid. She tells me in spite of using erythromycin ophthalmic ointment and warm, moist compresses, the pain, redness and swelling are worsening. She tells me she was seen on Friday at the [facility] and she was instructed to perform warm, moist compresses. She tells me she has erythromycin ophthalmic ointment from previous emergency visits for similar problems with styes. She tells me back in June that she had this problem and the ophthalmic ointment did not work and ultimately was prescribed antibiotics that \"cleared the problem right up\". She does not wear contact lenses. She denies any visual changes. She denies any fever. 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 There has been no chest pain or shortness of breath. There has been no nausea, vomiting or diarrhea. She denies head or neck pain. She takes no medications daily but endorses a penicillin allergy. There are no other complaints, changes, or physical findings at this time.     PCP: Colletta Clap, MD      Past History     Past Medical History:  Past Medical History:   Diagnosis Date    Asthma        Past Surgical History:  Past Surgical History:   Procedure Laterality Date    HX ACL RECONSTRUCTION Left 2012    HX ORTHOPAEDIC  2012    left ACL repaired       Family History:  Family History   Problem Relation Age of Onset    No Known Problems Mother     Cancer Father     No Known Problems Sister     No Known Problems Brother     Cancer Maternal Grandmother     Cancer Maternal Grandfather     Cancer Paternal Grandmother     Cancer Paternal Grandfather        Social History:  Social History     Tobacco Use    Smoking status: Never Smoker    Smokeless tobacco: Never Used   Substance Use Topics    Alcohol use: No    Drug use: No       Allergies: Allergies   Allergen Reactions    Latex Other (comments)     Skin irritation    Amoxicillin Itching    Pcn [Penicillins] Hives    Shellfish Derived Itching     Review of Systems   Review of Systems   Constitutional: Negative for fatigue and fever. HENT: Negative for ear pain and sore throat. Eyes: Negative for pain, redness and visual disturbance. Left upper eyelid redness, swelling and tenderness   Respiratory: Negative for cough and shortness of breath. Cardiovascular: Negative for chest pain and palpitations. Gastrointestinal: Negative for abdominal pain, nausea and vomiting. Genitourinary: Negative for dysuria, frequency and urgency. Musculoskeletal: Negative for back pain, gait problem, neck pain and neck stiffness. Skin: Negative for rash and wound. Neurological: Negative for dizziness, weakness, light-headedness, numbness and headaches. Physical Exam   Physical Exam  Vitals and nursing note reviewed. Constitutional:       General: She is not in acute distress. Appearance: She is well-developed. She is not toxic-appearing. HENT:      Head: Normocephalic and atraumatic. Jaw: No trismus. Right Ear: External ear normal.      Left Ear: External ear normal.      Nose: Nose normal.      Mouth/Throat:      Pharynx: Uvula midline. Eyes:      General: No scleral icterus. Conjunctiva/sclera: Conjunctivae normal.      Pupils: Pupils are equal, round, and reactive to light.         Comments:   LEFT EYE:  There is mild edema, erythema and tenderness of the left upper lateral eyelid. There is no definite nodular lesion of the lid. There is no periorbital edema or erythema. Sclera are white and there is no conjunctival injection or chemosis. EOMI without pain or diplopia. PERRL. Cardiovascular:      Rate and Rhythm: Normal rate and regular rhythm. Pulmonary:      Effort: Pulmonary effort is normal. No tachypnea, accessory muscle usage or respiratory distress. Breath sounds: No decreased breath sounds or wheezing. Abdominal:      Palpations: Abdomen is soft. Tenderness: There is no abdominal tenderness. Musculoskeletal:         General: Normal range of motion. Cervical back: Full passive range of motion without pain and normal range of motion. Skin:     Findings: No rash. Neurological:      Mental Status: She is alert and oriented to person, place, and time. She is not disoriented. GCS: GCS eye subscore is 4. GCS verbal subscore is 5. GCS motor subscore is 6. Cranial Nerves: No cranial nerve deficit. Psychiatric:         Speech: Speech normal.       Diagnostic Study Results     Labs -   No results found for this or any previous visit (from the past 12 hour(s)). Radiologic Studies -   No orders to display     CT Results  (Last 48 hours)    None        CXR Results  (Last 48 hours)    None        Medical Decision Making   I am the first provider for this patient. I reviewed the vital signs, available nursing notes, past medical history, past surgical history, family history and social history. Vital Signs-Reviewed the patient's vital signs.   Patient Vitals for the past 12 hrs:   Temp Pulse Resp BP SpO2   11/23/21 1605 98.2 °F (36.8 °C) 73 18 113/82 100 %       Pulse Oximetry Analysis - 100% on RA    Records Reviewed: Nursing Notes, Old Medical Records, Previous Radiology Studies and Previous Laboratory Studies    Provider Notes (Medical Decision Making):   DDx: Hordeant, blepharitis, chalazion, preseptal cellulitis    Afebrile and well-appearing. Patient presents with left upper lateral lid swelling, redness and tenderness for several days. She has seen no improvement with topical erythromycin ointment and warm compresses. On exam there is mild redness, swelling and tenderness of the upper eyelid without definite nodular lesion of the lid and without periorbital edema or erythema. Records review reveals a similar problem in June for which the patient endorses excellent improvement with a course of oral antibiotics. Additional testing deferred. Will offer clindamycin and will encourage the patient to continue warm, moist compresses with erythromycin. Will refer to [facility]. Return precautions for worsening symptoms or any concerns. ED Course:   Initial assessment performed. The patients presenting problems have been discussed, and they are in agreement with the care plan formulated and outlined with them. I have encouraged them to ask questions as they arise throughout their visit. Disposition:  Discharge    PLAN:  1. Current Discharge Medication List      START taking these medications    Details   clindamycin (CLEOCIN) 300 mg capsule Take 1 Capsule by mouth three (3) times daily for 7 days. Qty: 21 Capsule, Refills: 0  Start date: 11/23/2021, End date: 11/30/2021      diphenhydrAMINE (BenadryL) 25 mg capsule Take 1 Capsule by mouth every six (6) hours as needed for Skin Irritation for up to 10 days. Qty: 21 Capsule, Refills: 0  Start date: 11/23/2021, End date: 12/3/2021           2. Follow-up Information     Follow up With Specialties Details Why Contact Info    [facility] [place]  Call  OPHTHALMOLOGY: as needed if symptoms persist [address] [place] [address] [phone]        Return to ED if worse     Diagnosis     Clinical Impression:   1.  Cellulitis of left upper eyelid

## 2022-01-19 ENCOUNTER — HOSPITAL ENCOUNTER (EMERGENCY)
Age: 27
Discharge: HOME | End: 2022-01-19
Attending: EMERGENCY MEDICINE
Payer: COMMERCIAL

## 2022-01-19 VITALS
RESPIRATION RATE: 20 BRPM | HEIGHT: 68 IN | DIASTOLIC BLOOD PRESSURE: 93 MMHG | SYSTOLIC BLOOD PRESSURE: 130 MMHG | OXYGEN SATURATION: 100 % | WEIGHT: 140 LBS | BODY MASS INDEX: 21.22 KG/M2 | TEMPERATURE: 98 F | HEART RATE: 87 BPM

## 2022-01-19 DIAGNOSIS — N30.91 HEMORRHAGIC CYSTITIS: Primary | ICD-10-CM

## 2022-01-19 LAB
APPEARANCE UR: ABNORMAL
BACTERIA URNS QL MICRO: NEGATIVE /HPF
BILIRUB UR QL: NEGATIVE
COLOR UR: ABNORMAL
EPITH CASTS URNS QL MICRO: ABNORMAL /LPF
GLUCOSE UR STRIP.AUTO-MCNC: NEGATIVE MG/DL
HCG UR QL: NEGATIVE
HGB UR QL STRIP: ABNORMAL
KETONES UR QL STRIP.AUTO: ABNORMAL MG/DL
LEUKOCYTE ESTERASE UR QL STRIP.AUTO: ABNORMAL
NITRITE UR QL STRIP.AUTO: NEGATIVE
PH UR STRIP: 6 [PH] (ref 5–8)
PROT UR STRIP-MCNC: 100 MG/DL
RBC #/AREA URNS HPF: >100 /HPF (ref 0–5)
SP GR UR REFRACTOMETRY: 1.01 (ref 1–1.03)
UA: UC IF INDICATED,UAUC: ABNORMAL
UROBILINOGEN UR QL STRIP.AUTO: 0.2 EU/DL (ref 0.2–1)
WBC URNS QL MICRO: ABNORMAL /HPF (ref 0–4)

## 2022-01-19 PROCEDURE — 87086 URINE CULTURE/COLONY COUNT: CPT

## 2022-01-19 PROCEDURE — 81025 URINE PREGNANCY TEST: CPT

## 2022-01-19 PROCEDURE — 87077 CULTURE AEROBIC IDENTIFY: CPT

## 2022-01-19 PROCEDURE — 99283 EMERGENCY DEPT VISIT LOW MDM: CPT

## 2022-01-19 PROCEDURE — 87186 SC STD MICRODIL/AGAR DIL: CPT

## 2022-01-19 PROCEDURE — 81001 URINALYSIS AUTO W/SCOPE: CPT

## 2022-01-19 RX ORDER — NITROFURANTOIN 25; 75 MG/1; MG/1
100 CAPSULE ORAL 2 TIMES DAILY
Qty: 10 CAPSULE | Refills: 0 | Status: SHIPPED | OUTPATIENT
Start: 2022-01-19 | End: 2022-01-24

## 2022-01-19 RX ORDER — NITROFURANTOIN 25; 75 MG/1; MG/1
100 CAPSULE ORAL 2 TIMES DAILY
Qty: 10 CAPSULE | Refills: 0 | Status: SHIPPED | OUTPATIENT
Start: 2022-01-19 | End: 2022-01-19 | Stop reason: SDUPTHER

## 2022-01-19 NOTE — ED NOTES
Pt is AOx4. Respirations are even and unlabored. Skin is warm and dry. Pt to ED for pelvic pain, dysuria and  Hematuria since 0100am today. Pt also reports nausea, denies vomiting or diarrhea. LMP started 1/9/22, ended on 1/14/2022. Bed in lowest locked position. Call bell within reach. Emergency Department Nursing Plan of Care       The Nursing Plan of Care is developed from the Nursing assessment and Emergency Department Attending provider initial evaluation. The plan of care may be reviewed in the ED Provider note.     The Plan of Care was developed with the following considerations:   Patient / Family readiness to learn indicated by:verbalized understanding  Persons(s) to be included in education: patient  Barriers to Learning/Limitations:No    Signed     Dilip Ahn RN    1/19/2022   5:51 AM

## 2022-01-19 NOTE — ED PROVIDER NOTES
EMERGENCY DEPARTMENT HISTORY AND PHYSICAL EXAM      Date: 1/19/2022  Patient Name: Brenna Simms    History of Presenting Illness     Chief Complaint   Patient presents with    Urinary Pain       History Provided By: Patient    HPI: Brenna Simms, 27 y.o. female with PMHx significant for asthma who presents the chief complaint of pain with urination and hematuria that began earlier this morning. Denies any nausea, vomiting, diarrhea. Last menstrual cycle was last week. Is concerned she might have a UTI. PCP: Griffin Onofre MD    There are no other complaints, changes, or physical findings at this time. Past History     Past Medical History:  Past Medical History:   Diagnosis Date    Asthma      Past Surgical History:  Past Surgical History:   Procedure Laterality Date    HX ACL RECONSTRUCTION Left 2012    HX ORTHOPAEDIC  2012    left ACL repaired     Family History:  Family History   Problem Relation Age of Onset    No Known Problems Mother     Cancer Father     No Known Problems Sister     No Known Problems Brother     Cancer Maternal Grandmother     Cancer Maternal Grandfather     Cancer Paternal Grandmother     Cancer Paternal Grandfather      Social History:  Social History     Tobacco Use    Smoking status: Never Smoker    Smokeless tobacco: Never Used   Substance Use Topics    Alcohol use: No    Drug use: No     Allergies: Allergies   Allergen Reactions    Latex Other (comments)     Skin irritation    Amoxicillin Itching    Pcn [Penicillins] Hives    Shellfish Derived Itching     Review of Systems   Review of Systems   Genitourinary: Positive for dysuria and hematuria. All other systems reviewed and are negative. Physical Exam   Physical Exam  Vitals and nursing note reviewed. Constitutional:       General: She is not in acute distress. Appearance: She is well-developed. HENT:      Head: Normocephalic and atraumatic.    Eyes: Conjunctiva/sclera: Conjunctivae normal.      Pupils: Pupils are equal, round, and reactive to light. Cardiovascular:      Rate and Rhythm: Normal rate and regular rhythm. Pulmonary:      Effort: Pulmonary effort is normal. No respiratory distress. Breath sounds: Normal breath sounds. No stridor. Abdominal:      General: There is no distension. Palpations: Abdomen is soft. Tenderness: There is no abdominal tenderness. Musculoskeletal:         General: Normal range of motion. Cervical back: Normal range of motion. Skin:     General: Skin is warm and dry. Neurological:      Mental Status: She is alert and oriented to person, place, and time. Diagnostic Study Results   Labs -     Recent Results (from the past 12 hour(s))   URINALYSIS W/ REFLEX CULTURE    Collection Time: 01/19/22  5:45 AM    Specimen: Urine   Result Value Ref Range    Color RED      Appearance (A) CLEAR       Macroscopic performed on spun urine due to gross blood  or mucus    Specific gravity 1.015 1.003 - 1.030      pH (UA) 6.0 5.0 - 8.0      Protein 100 (A) NEG mg/dL    Glucose Negative NEG mg/dL    Ketone TRACE (A) NEG mg/dL    Bilirubin Negative NEG      Blood LARGE (A) NEG      Urobilinogen 0.2 0.2 - 1.0 EU/dL    Nitrites Negative NEG      Leukocyte Esterase MODERATE (A) NEG      WBC 10-20 0 - 4 /hpf    RBC >100 (H) 0 - 5 /hpf    Epithelial cells MODERATE (A) FEW /lpf    Bacteria Negative NEG /hpf    UA:UC IF INDICATED URINE CULTURE ORDERED (A) CNI     HCG URINE, QL. - POC    Collection Time: 01/19/22  5:55 AM   Result Value Ref Range    Pregnancy test,urine (POC) Negative NEG         Radiologic Studies -   No orders to display     No results found. Medical Decision Making   I am the first provider for this patient. I reviewed the vital signs, available nursing notes, past medical history, past surgical history, family history and social history. Vital Signs-Reviewed the patient's vital signs.   Patient Vitals for the past 12 hrs:   Temp Pulse Resp BP SpO2   01/19/22 0532 98 °F (36.7 °C) 87 20 (!) 130/93 100 %       Pulse Oximetry Analysis - 100% on ra      Records Reviewed: Nursing Notes and Old Medical Records    Provider Notes (Medical Decision Making):   Patient presents with dysuria and hematuria. Overall well-appearing on exam with stable vital signs. No flank pain to suggest kidney stone. Suspect UTI, likely hemorrhagic cystitis. Will send urinalysis, check urine pregnancy. ED Course:   Initial assessment performed. The patients presenting problems have been discussed, and they are in agreement with the care plan formulated and outlined with them. I have encouraged them to ask questions as they arise throughout their visit. Procedures:  Procedures    Critical Care:  none    Disposition:  Discharge Note:  The patient has been re-evaluated and is ready for discharge. Reviewed available results with patient. Counseled patient on diagnosis and care plan. Patient has expressed understanding, and all questions have been answered. Patient agrees with plan and agrees to follow up as recommended, or to return to the ED if their symptoms worsen. Discharge instructions have been provided and explained to the patient, along with reasons to return to the ED. PLAN:  1. Current Discharge Medication List      START taking these medications    Details   nitrofurantoin, macrocrystal-monohydrate, (Macrobid) 100 mg capsule Take 1 Capsule by mouth two (2) times a day for 5 days. Qty: 10 Capsule, Refills: 0  Start date: 1/19/2022, End date: 1/24/2022           2.    Follow-up Information     Follow up With Specialties Details Why Contact Info    daniel Eaton MD Family Medicine Schedule an appointment as soon as possible for a visit   [phone]      [facility] EMERGENCY DEPT Emergency Medicine  As needed, If symptoms worsen [address] [place] [address] [place] [address]  [phone]        Return to ED if worse     Diagnosis     Clinical Impression:   1. Hemorrhagic cystitis            Please note that this dictation was completed with [employer], the computer voice recognition software. Quite often unanticipated grammatical, syntax, homophones, and other interpretive errors are inadvertently transcribed by the computer software. Please disregard these errors.   Please excuse any errors that have escaped final proofreading

## 2022-01-19 NOTE — LETTER
[facility] EMERGENCY DEPT  [address] [address] [phone]    Work/School Note    Date: 1/19/2022    To Whom It May concern:      Wendee Duverney was seen and treated today in the emergency room by the following provider(s):  Attending Provider: Miri Duff MD.      Wendee Duverney is excused from work/school on 01/19/22. She is clear to return to work/school on 01/20/22.         Sincerely,          Gifty Mirza RN

## 2022-01-21 LAB
BACTERIA SPEC CULT: ABNORMAL
CC UR VC: ABNORMAL
SERVICE CMNT-IMP: ABNORMAL

## 2022-01-21 RX ORDER — FLUCONAZOLE 150 MG/1
150 TABLET ORAL
Qty: 1 TABLET | Refills: 0 | Status: SHIPPED | OUTPATIENT
Start: 2022-01-21 | End: 2022-01-21

## 2022-02-02 ENCOUNTER — APPOINTMENT (OUTPATIENT)
Dept: GENERAL RADIOLOGY | Age: 27
End: 2022-02-02
Attending: PHYSICIAN ASSISTANT
Payer: COMMERCIAL

## 2022-02-02 ENCOUNTER — HOSPITAL ENCOUNTER (EMERGENCY)
Age: 27
Discharge: HOME | End: 2022-02-02
Attending: EMERGENCY MEDICINE
Payer: COMMERCIAL

## 2022-02-02 VITALS
TEMPERATURE: 97.9 F | RESPIRATION RATE: 18 BRPM | SYSTOLIC BLOOD PRESSURE: 106 MMHG | WEIGHT: 140 LBS | OXYGEN SATURATION: 100 % | HEIGHT: 68 IN | DIASTOLIC BLOOD PRESSURE: 55 MMHG | HEART RATE: 93 BPM | BODY MASS INDEX: 21.22 KG/M2

## 2022-02-02 DIAGNOSIS — M54.50 ACUTE BILATERAL LOW BACK PAIN WITHOUT SCIATICA: Primary | ICD-10-CM

## 2022-02-02 LAB
APPEARANCE UR: CLEAR
BACTERIA URNS QL MICRO: NEGATIVE /HPF
BILIRUB UR QL: NEGATIVE
COLOR UR: ABNORMAL
EPITH CASTS URNS QL MICRO: ABNORMAL /LPF
GLUCOSE UR STRIP.AUTO-MCNC: NEGATIVE MG/DL
HGB UR QL STRIP: NEGATIVE
KETONES UR QL STRIP.AUTO: NEGATIVE MG/DL
LEUKOCYTE ESTERASE UR QL STRIP.AUTO: NEGATIVE
NITRITE UR QL STRIP.AUTO: NEGATIVE
PH UR STRIP: 6 [PH] (ref 5–8)
PROT UR STRIP-MCNC: NEGATIVE MG/DL
RBC #/AREA URNS HPF: ABNORMAL /HPF (ref 0–5)
SP GR UR REFRACTOMETRY: 1.02 (ref 1–1.03)
UA: UC IF INDICATED,UAUC: ABNORMAL
UROBILINOGEN UR QL STRIP.AUTO: 1 EU/DL (ref 0.2–1)
WBC URNS QL MICRO: ABNORMAL /HPF (ref 0–4)

## 2022-02-02 PROCEDURE — 99283 EMERGENCY DEPT VISIT LOW MDM: CPT

## 2022-02-02 PROCEDURE — 81001 URINALYSIS AUTO W/SCOPE: CPT

## 2022-02-02 PROCEDURE — 72100 X-RAY EXAM L-S SPINE 2/3 VWS: CPT

## 2022-02-02 RX ORDER — LIDOCAINE 50 MG/G
PATCH TOPICAL
Qty: 5 EACH | Refills: 0 | Status: SHIPPED | OUTPATIENT
Start: 2022-02-02

## 2022-02-02 RX ORDER — DOCUSATE SODIUM 100 MG/1
100 CAPSULE, LIQUID FILLED ORAL 2 TIMES DAILY
Qty: 20 CAPSULE | Refills: 0 | Status: SHIPPED | OUTPATIENT
Start: 2022-02-02 | End: 2022-02-12

## 2022-02-02 RX ORDER — MELOXICAM 15 MG/1
15 TABLET ORAL DAILY
Qty: 10 TABLET | Refills: 0 | Status: SHIPPED | OUTPATIENT
Start: 2022-02-02 | End: 2022-02-12

## 2022-02-02 RX ORDER — TIZANIDINE 4 MG/1
4 TABLET ORAL
Qty: 15 TABLET | Refills: 0 | Status: SHIPPED | OUTPATIENT
Start: 2022-02-02 | End: 2022-02-07

## 2022-02-02 NOTE — ED PROVIDER NOTES
EMERGENCY DEPARTMENT HISTORY AND PHYSICAL EXAM      Date: 2/2/2022  Patient Name: Kiko Lozoya    History of Presenting Illness     Chief Complaint   Patient presents with    Back Pain       History Provided By: Patient    HPI: Kiko Lozoya, 27 y.o. female presents ambulatory to the Emergency Dept with c/o low back pain for the last several days. She denied h/o chronic back pain. States she does not recall any injury, heavy lifting, strain or change in her physical routine. She has a two year old. She has had some mild constipation, but no dysuria/hematuria. No incontinence of bowel/bladder. No numbness/tingling/weakness. She denied radiation of her symptoms into her legs. She rates her pain a 8/10 on the pain scale and describes it as a constant ache. She has not taken anything for pain today. Pt is o/w healthy without fever, chills, cough, congestion, ST, shortness of breath, chest pain, N/V/D. There are no other complaints, changes, or physical findings at this time. PCP: Aime Knapp, Zack Hanley MD    Current Outpatient Medications   Medication Sig Dispense Refill    meloxicam (MOBIC) 15 mg tablet Take 1 Tablet by mouth daily for 10 days. 10 Tablet 0    tiZANidine (ZANAFLEX) 4 mg tablet Take 1 Tablet by mouth three (3) times daily as needed for Muscle Spasm(s) for up to 5 days. 15 Tablet 0    lidocaine (LIDODERM) 5 % Apply patch to the affected area for 12 hours a day and remove for 12 hours a day.  5 Each 0       Past History     Past Medical History:  Past Medical History:   Diagnosis Date    Asthma        Past Surgical History:  Past Surgical History:   Procedure Laterality Date    HX ACL RECONSTRUCTION Left 2012    HX ORTHOPAEDIC  2012    left ACL repaired       Family History:  Family History   Problem Relation Age of Onset    No Known Problems Mother     Cancer Father     No Known Problems Sister     No Known Problems Brother     Cancer Maternal Grandmother     Cancer Maternal Grandfather     Cancer Paternal Grandmother     Cancer Paternal Grandfather        Social History:  Social History     Tobacco Use    Smoking status: Never Smoker    Smokeless tobacco: Never Used   Substance Use Topics    Alcohol use: No    Drug use: No       Allergies: Allergies   Allergen Reactions    Latex Other (comments)     Skin irritation    Amoxicillin Itching    Pcn [Penicillins] Hives    Shellfish Derived Itching         Review of Systems   Review of Systems   Constitutional: Negative for chills and fever. HENT: Negative for congestion, rhinorrhea and sore throat. Respiratory: Negative for cough and shortness of breath. Cardiovascular: Negative for chest pain and palpitations. Gastrointestinal: Positive for constipation. Negative for abdominal pain, diarrhea, nausea and vomiting. Endocrine: Negative for polydipsia, polyphagia and polyuria. Genitourinary: Negative for dysuria and hematuria. Musculoskeletal: Positive for back pain. Negative for neck pain and neck stiffness. Skin: Negative for color change, pallor, rash and wound. Allergic/Immunologic: Negative for food allergies and immunocompromised state. Neurological: Negative for weakness, numbness and headaches. Hematological: Negative for adenopathy. Does not bruise/bleed easily. Psychiatric/Behavioral: Negative for agitation and confusion. All other systems reviewed and are negative. Physical Exam   Physical Exam  Vitals and nursing note reviewed. Constitutional:       General: She is not in acute distress. Appearance: Normal appearance. She is well-developed and normal weight. She is not ill-appearing, toxic-appearing or diaphoretic. HENT:      Head: Normocephalic and atraumatic. Nose: Nose normal. No congestion or rhinorrhea. Eyes:      General: No scleral icterus. Right eye: No discharge. Left eye: No discharge.       Conjunctiva/sclera: Conjunctivae normal.   Neck: Thyroid: No thyromegaly. Vascular: No JVD. Trachea: No tracheal deviation. Cardiovascular:      Rate and Rhythm: Normal rate and regular rhythm. Pulses: Normal pulses. Heart sounds: Normal heart sounds. Pulmonary:      Effort: Pulmonary effort is normal. No respiratory distress. Breath sounds: Normal breath sounds. No wheezing. Abdominal:      General: Abdomen is flat. Palpations: Abdomen is soft. Tenderness: There is no abdominal tenderness. There is no right CVA tenderness, left CVA tenderness or rebound. Musculoskeletal:         General: Tenderness present. No deformity. Cervical back: Normal range of motion and neck supple. No tenderness. Comments: Decreased A/P ROM to paralumbar musculature bilat due to tenderness with palpation and movement. No deformity noted. No midline spinal tenderness. Pt observed to ambulate without deficit. 2+ distal pulses, NVI. Sensation grossly intact to light touch. Skin:     General: Skin is warm and dry. Coloration: Skin is not pale. Findings: No bruising, erythema or rash. Neurological:      General: No focal deficit present. Mental Status: She is alert and oriented to person, place, and time. Sensory: No sensory deficit. Motor: No weakness or abnormal muscle tone.       Coordination: Coordination normal.   Psychiatric:         Mood and Affect: Mood normal.         Behavior: Behavior normal.         Judgment: Judgment normal.         Diagnostic Study Results     Labs -     Recent Results (from the past 12 hour(s))   URINALYSIS W/ REFLEX CULTURE    Collection Time: 02/02/22  1:19 PM    Specimen: Urine   Result Value Ref Range    Color YELLOW/STRAW      Appearance CLEAR CLEAR      Specific gravity 1.025 1.003 - 1.030      pH (UA) 6.0 5.0 - 8.0      Protein Negative NEG mg/dL    Glucose Negative NEG mg/dL    Ketone Negative NEG mg/dL    Bilirubin Negative NEG      Blood Negative NEG Urobilinogen 1.0 0.2 - 1.0 EU/dL    Nitrites Negative NEG      Leukocyte Esterase Negative NEG      WBC 0-4 0 - 4 /hpf    RBC 0-5 0 - 5 /hpf    Epithelial cells MODERATE (A) FEW /lpf    Bacteria Negative NEG /hpf    UA:UC IF INDICATED CULTURE NOT INDICATED BY UA RESULT CNI         Radiologic Studies -   XR SPINE LUMB 2 OR 3 V   Final Result   Normal lumbar spine views. Medical Decision Making   I am the first provider for this patient. I reviewed the vital signs, available nursing notes, past medical history, past surgical history, family history and social history. Vital Signs-Reviewed the patient's vital signs. Patient Vitals for the past 12 hrs:   Temp Pulse Resp BP SpO2   02/02/22 1236 97.9 °F (36.6 °C) 93 18 (!) 106/55 100 %           Records Reviewed: Nursing Notes, Old Medical Records, Previous Radiology Studies and Previous Laboratory Studies    Provider Notes (Medical Decision Making):   Strain, spasm, DDD, compression fx, UTI, constipation    ED Course:   Initial assessment performed. The patients presenting problems have been discussed, and they are in agreement with the care plan formulated and outlined with them. I have encouraged them to ask questions as they arise throughout their visit. DISCHARGE NOTE:  The care plan has been outline with the patient and/or family, who verbally conveyed understanding and agreement. Available results have been reviewed. Patient and/or family understand the follow up plan as outlined and discharge instructions. Should their condition deterioration at any time after discharge the patient agrees to return, follow up sooner than outlined or seek medical assistance at the closest Emergency Room as soon as possible. Questions have been answered.  Discharge instructions and educational information regarding the patient's diagnosis as well a list of reasons why the patient would want to seek immediate medical attention, should their condition change, were reviewed directly with the patient/family          PLAN:  1. Current Discharge Medication List      START taking these medications    Details   meloxicam (MOBIC) 15 mg tablet Take 1 Tablet by mouth daily for 10 days. Qty: 10 Tablet, Refills: 0  Start date: 2/2/2022, End date: 2/12/2022      tiZANidine (ZANAFLEX) 4 mg tablet Take 1 Tablet by mouth three (3) times daily as needed for Muscle Spasm(s) for up to 5 days. Qty: 15 Tablet, Refills: 0  Start date: 2/2/2022, End date: 2/7/2022      lidocaine (LIDODERM) 5 % Apply patch to the affected area for 12 hours a day and remove for 12 hours a day. Qty: 5 Each, Refills: 0  Start date: 2/2/2022           2. Follow-up Information     Follow up With Specialties Details Why [address]    [address] [id number] [phone]    [facility] EMERGENCY DEPT Emergency Medicine  If symptoms worsen Logan Santana  [phone]        Return to ED if worse     Diagnosis     Clinical Impression:   1.  Acute bilateral low back pain without sciatica

## 2022-02-02 NOTE — Clinical Note
[address]  [facility] EMERGENCY DEPT  [address] [address] [phone]    Work/School Note    Date: 2/2/2022    To Whom It May concern:    Baldemar Whitten was seen and treated today in the emergency room by the following provider(s):  Attending Provider: Hemant Boyd MD  Physician Assistant: Sarah Shaffer, [address]. Baldemar Whitten is excused from work/school on 2/2/2022 through 2/4/2022. She is medically clear to return to work/school on 2/5/2022.          Sincerely,          Nilson Hernandez, [address]

## 2022-02-02 NOTE — ED NOTES
Discharge instructions were given to the patient by Felix Enrique. The patient left the Emergency Department ambulatory, alert and oriented and in no acute distress with 4 prescriptions. The patient was encouraged to call or return to the ED for worsening issues or problems and was encouraged to schedule a follow up appointment for continuing care. The patient verbalized understanding of discharge instructions and prescriptions, all questions were answered. The patient has no further concerns at this time.       Pt left with a work note

## 2022-05-28 ENCOUNTER — HOSPITAL ENCOUNTER (EMERGENCY)
Age: 27
Discharge: HOME | End: 2022-05-28
Attending: STUDENT IN AN ORGANIZED HEALTH CARE EDUCATION/TRAINING PROGRAM
Payer: COMMERCIAL

## 2022-05-28 VITALS
OXYGEN SATURATION: 96 % | SYSTOLIC BLOOD PRESSURE: 111 MMHG | WEIGHT: 143 LBS | HEART RATE: 80 BPM | DIASTOLIC BLOOD PRESSURE: 69 MMHG | TEMPERATURE: 98.8 F | RESPIRATION RATE: 18 BRPM | BODY MASS INDEX: 21.67 KG/M2 | HEIGHT: 68 IN

## 2022-05-28 DIAGNOSIS — L02.31 LEFT BUTTOCK ABSCESS: Primary | ICD-10-CM

## 2022-05-28 PROCEDURE — 75810000289 HC I&D ABSCESS SIMP/COMP/MULT

## 2022-05-28 PROCEDURE — 99283 EMERGENCY DEPT VISIT LOW MDM: CPT

## 2022-05-28 RX ORDER — ACETAMINOPHEN 500 MG
1000 TABLET ORAL
Qty: 20 TABLET | Refills: 0 | Status: SHIPPED | OUTPATIENT
Start: 2022-05-28

## 2022-05-28 RX ORDER — DOXYCYCLINE HYCLATE 100 MG
100 TABLET ORAL 2 TIMES DAILY
Qty: 20 TABLET | Refills: 0 | Status: SHIPPED | OUTPATIENT
Start: 2022-05-28 | End: 2022-06-07

## 2022-05-28 RX ORDER — IBUPROFEN 600 MG/1
600 TABLET ORAL
Qty: 20 TABLET | Refills: 0 | Status: SHIPPED | OUTPATIENT
Start: 2022-05-28

## 2022-05-28 NOTE — ED NOTES
Emergency Department Nursing Plan of Care       The Nursing Plan of Care is developed from the Nursing assessment and Emergency Department Attending provider initial evaluation. The plan of care may be reviewed in the ED Provider note.     The Plan of Care was developed with the following considerations:   Patient / Family readiness to learn indicated by:verbalized understanding  Persons(s) to be included in education: patient  Barriers to Learning/Limitations:No    Signed     Edin Moore RN    5/28/2022   3:04 PM

## 2022-05-28 NOTE — ED PROVIDER NOTES
EMERGENCY DEPARTMENT HISTORY AND PHYSICAL EXAM    Date: 5/28/2022  Patient Name: Camilo Camarena    History of Presenting Illness     Chief Complaint   Patient presents with    Abscess         History Provided By: Patient    Chief Complaint: abscess  Duration: onset 2 days ago   Timing:  Acute  Location: left buttock  Quality: Tightness  Severity: 5 out of 10  Modifying Factors: palpation worsens pain  Associated Symptoms: denies any other associated signs or symptoms      HPI: Camilo Camarena is a 32 y.o. female with a PMH of No significant past medical history who presents with right buttock cute onset 2 days ago. Patient states palpation worsens pain. Denies drainage. Denies history of abscess. PCP: Anali Rajput MD    Current Outpatient Medications   Medication Sig Dispense Refill    ibuprofen (MOTRIN) 600 mg tablet Take 1 Tablet by mouth every six (6) hours as needed for Pain. 20 Tablet 0    acetaminophen (Tylenol Extra Strength) 500 mg tablet Take 2 Tablets by mouth every six (6) hours as needed for Pain. 20 Tablet 0    doxycycline (VIBRA-TABS) 100 mg tablet Take 1 Tablet by mouth two (2) times a day for 10 days. 20 Tablet 0    lidocaine (LIDODERM) 5 % Apply patch to the affected area for 12 hours a day and remove for 12 hours a day.  5 Each 0       Past History     Past Medical History:  Past Medical History:   Diagnosis Date    Asthma        Past Surgical History:  Past Surgical History:   Procedure Laterality Date    HX ACL RECONSTRUCTION Left 2012    HX ORTHOPAEDIC  2012    left ACL repaired       Family History:  Family History   Problem Relation Age of Onset    No Known Problems Mother     Cancer Father     No Known Problems Sister     No Known Problems Brother     Cancer Maternal Grandmother     Cancer Maternal Grandfather     Cancer Paternal Grandmother     Cancer Paternal Grandfather        Social History:  Social History     Tobacco Use    Smoking status: Never Smoker    Smokeless tobacco: Never Used   Substance Use Topics    Alcohol use: No    Drug use: No       Allergies: Allergies   Allergen Reactions    Latex Other (comments)     Skin irritation    Amoxicillin Itching    Pcn [Penicillins] Hives    Shellfish Derived Itching         Review of Systems   Review of Systems   Constitutional: Negative for fatigue and fever. Respiratory: Negative for shortness of breath and wheezing. Cardiovascular: Negative for chest pain. Gastrointestinal: Negative for abdominal pain. Musculoskeletal: Negative for arthralgias. Skin: Negative for pallor and rash. abscess   Neurological: Negative for dizziness, tremors and headaches. All other systems reviewed and are negative. Physical Exam     Vitals:    05/28/22 1449   BP: 111/69   Pulse: 80   Resp: 18   Temp: 98.8 °F (37.1 °C)   SpO2: 96%   Weight: 64.9 kg (143 lb)   Height: 5' 8\" (1.727 m)     Physical Exam  Vitals and nursing note reviewed. Constitutional:       General: She is not in acute distress. Appearance: She is well-developed. HENT:      Head: Normocephalic and atraumatic. Right Ear: External ear normal.      Left Ear: External ear normal.      Nose: Nose normal.   Eyes:      Conjunctiva/sclera: Conjunctivae normal.   Cardiovascular:      Rate and Rhythm: Normal rate and regular rhythm. Heart sounds: Normal heart sounds. Pulmonary:      Effort: Pulmonary effort is normal. No respiratory distress. Breath sounds: Normal breath sounds. No wheezing. Abdominal:      General: Bowel sounds are normal.      Palpations: Abdomen is soft. Tenderness: There is no abdominal tenderness. Musculoskeletal:         General: Normal range of motion. Cervical back: Normal range of motion and neck supple. Lymphadenopathy:      Cervical: No cervical adenopathy. Skin:     General: Skin is warm and dry. Findings: No rash.           Neurological:      Mental Status: She is alert and oriented to person, place, and time. Cranial Nerves: No cranial nerve deficit. Coordination: Coordination normal.   Psychiatric:         Behavior: Behavior normal.         Thought Content: Thought content normal.         Judgment: Judgment normal.           Diagnostic Study Results     Labs -   No results found for this or any previous visit (from the past 12 hour(s)). Radiologic Studies -   No orders to display     CT Results  (Last 48 hours)    None        CXR Results  (Last 48 hours)    None            Medical Decision Making   I am the first provider for this patient. I reviewed the vital signs, available nursing notes, past medical history, past surgical history, family history and social history. Vital Signs-Reviewed the patient's vital signs. Records Reviewed: Nursing Notes    Provider Notes (Medical Decision Making):   DDX  Abscess folliculitis cellultisi          Disposition:  home    DISCHARGE NOTE:     4:07 PM  I have discussed with patient their diagnosis, treatment, and follow up plan. The patient agrees to follow up as outlined in discharge paperwork and also to return to the ED with any worsening. Elisabeth Miranda NP      Follow-up Information     Follow up With Specialties Details Why Contact Info    [facility] EMERGENCY DEPT Emergency Medicine   [address] [facility] [address]  [phone]          Current Discharge Medication List      START taking these medications    Details   ibuprofen (MOTRIN) 600 mg tablet Take 1 Tablet by mouth every six (6) hours as needed for Pain. Qty: 20 Tablet, Refills: 0  Start date: 5/28/2022      acetaminophen (Tylenol Extra Strength) 500 mg tablet Take 2 Tablets by mouth every six (6) hours as needed for Pain. Qty: 20 Tablet, Refills: 0  Start date: 5/28/2022      doxycycline (VIBRA-TABS) 100 mg tablet Take 1 Tablet by mouth two (2) times a day for 10 days.   Qty: 20 Tablet, Refills: 0  Start date: 5/28/2022, End date: 6/7/2022 Procedures:  I&D Abcess Complex    Date/Time: 5/28/2022 3:35 PM  Performed by: Brigette Da Silva NP  Authorized by: Brigette Da Silva NP     Consent:     Consent obtained:  Verbal and written    Consent given by:  Patient    Risks discussed:  Bleeding    Alternatives discussed: n/a. Location:     Type:  Abscess    Size:  1 cm    Location: left buttock. Pre-procedure details:     Skin preparation:  Betadine  Anesthesia (see MAR for exact dosages): Anesthesia method:  Local infiltration    Local anesthetic:  Lidocaine 2% w/o epi  Procedure type:     Complexity:  Complex  Procedure details:     Needle aspiration: no      Incision types:  Stab incision and single straight    Incision depth:  Dermal    Scalpel blade:  11    Wound management:  Irrigated with saline    Drainage:  Purulent and bloody    Drainage amount: Moderate    Wound treatment:  Wound left open    Packing materials:  1/4 in iodoform gauze  Post-procedure details:     Patient tolerance of procedure: Tolerated well, no immediate complications        Please note that this dictation was completed with Dragon, computer voice recognition software. Quite often unanticipated grammatical, syntax, homophones, and other interpretive errors are inadvertently transcribed by the computer software. Please disregard these errors. Additionally, please excuse any errors that have escaped final proofreading. Diagnosis     Clinical Impression:   1.  Left buttock abscess

## 2022-05-31 ENCOUNTER — HOSPITAL ENCOUNTER (EMERGENCY)
Age: 27
Discharge: HOME | End: 2022-05-31
Attending: EMERGENCY MEDICINE
Payer: COMMERCIAL

## 2022-05-31 VITALS
TEMPERATURE: 98.4 F | HEART RATE: 79 BPM | HEIGHT: 68 IN | OXYGEN SATURATION: 98 % | BODY MASS INDEX: 21.22 KG/M2 | RESPIRATION RATE: 19 BRPM | WEIGHT: 140 LBS | DIASTOLIC BLOOD PRESSURE: 69 MMHG | SYSTOLIC BLOOD PRESSURE: 111 MMHG

## 2022-05-31 DIAGNOSIS — Z51.89 ENCOUNTER FOR WOUND RE-CHECK: Primary | ICD-10-CM

## 2022-05-31 PROCEDURE — 75810000275 HC EMERGENCY DEPT VISIT NO LEVEL OF CARE

## 2022-06-01 NOTE — ED NOTES
Pt following up to have packing removed after I&D two days ago. Emergency Department Nursing Plan of Care       The Nursing Plan of Care is developed from the Nursing assessment and Emergency Department Attending provider initial evaluation. The plan of care may be reviewed in the ED Provider note.     The Plan of Care was developed with the following considerations:   Patient / Family readiness to learn indicated by:verbalized understanding  Persons(s) to be included in education: patient  Barriers to Learning/Limitations:No    Signed     Maite Garcia RN    5/31/2022   8:43 PM

## 2022-06-01 NOTE — ED NOTES
Discharge instructions were given to the patient by Lara Rogers RN. The patient left the Emergency Department ambulatory, alert and oriented and in no acute distress with no prescriptions. The patient was encouraged to call or return to the ED for worsening issues or problems and was encouraged to schedule a follow up appointment for continuing care. The patient verbalized understanding of discharge instructions and prescriptions, all questions were answered. The patient has no further concerns at this time.

## 2022-06-01 NOTE — ED PROVIDER NOTES
EMERGENCY DEPARTMENT HISTORY AND PHYSICAL EXAM      Date: 5/31/2022  Patient Name: Dago Lacey    History of Presenting Illness     Chief Complaint   Patient presents with    Wound Check       History Provided By: Patient    HPI: Dago Lacey, 32 y.o. female with presents to the ED with for wound check 2 days status post I&D of left buttock abscess. The patient states the wound seems to be healing well and her pain is improved compared to 2 days ago. She is taking the doxycycline as prescribed. The packing was in place up until a few moments ago when she was laying on the stretcher and it fell out on its own. Denies fever, spontaneous drainage. There are no other complaints, changes, or physical findings at this time. PCP: Elfego Shipley MD    No current facility-administered medications on file prior to encounter. Current Outpatient Medications on File Prior to Encounter   Medication Sig Dispense Refill    doxycycline (VIBRA-TABS) 100 mg tablet Take 1 Tablet by mouth two (2) times a day for 10 days. 20 Tablet 0    ibuprofen (MOTRIN) 600 mg tablet Take 1 Tablet by mouth every six (6) hours as needed for Pain. 20 Tablet 0    acetaminophen (Tylenol Extra Strength) 500 mg tablet Take 2 Tablets by mouth every six (6) hours as needed for Pain. 20 Tablet 0    lidocaine (LIDODERM) 5 % Apply patch to the affected area for 12 hours a day and remove for 12 hours a day.  5 Each 0       Past History     Past Medical History:  Past Medical History:   Diagnosis Date    Asthma        Past Surgical History:  Past Surgical History:   Procedure Laterality Date    HX ACL RECONSTRUCTION Left 2012    HX ORTHOPAEDIC  2012    left ACL repaired       Family History:  Family History   Problem Relation Age of Onset    No Known Problems Mother     Cancer Father     No Known Problems Sister     No Known Problems Brother     Cancer Maternal Grandmother     Cancer Maternal Grandfather     Cancer Paternal Grandmother     Cancer Paternal Grandfather        Social History:  Social History     Tobacco Use    Smoking status: Never Smoker    Smokeless tobacco: Never Used   Substance Use Topics    Alcohol use: No    Drug use: No       Allergies: Allergies   Allergen Reactions    Latex Other (comments)     Skin irritation    Amoxicillin Itching    Pcn [Penicillins] Hives    Shellfish Derived Itching         Review of Systems   Review of Systems   Constitutional: Negative for fever. Skin:        S/p I&D   Hematological: Does not bruise/bleed easily. Physical Exam   Physical Exam  Vitals and nursing note reviewed. Exam conducted with a chaperone present. Constitutional:       General: She is not in acute distress. Appearance: Normal appearance. She is well-developed. HENT:      Head: Normocephalic and atraumatic. Eyes:      General: Lids are normal.      Extraocular Movements: Extraocular movements intact. Conjunctiva/sclera: Conjunctivae normal.   Pulmonary:      Effort: Pulmonary effort is normal.   Genitourinary:     Comments: Left buttock with very small incision bleeding a scant amount of red blood. There is no surrounding cellulitis. No purulent drainage or fluctuance. Musculoskeletal:         General: Normal range of motion. Cervical back: Normal range of motion and neck supple. Skin:     General: Skin is warm and dry. Neurological:      General: No focal deficit present. Mental Status: She is alert and oriented to person, place, and time. Psychiatric:         Mood and Affect: Mood normal.         Behavior: Behavior normal. Behavior is cooperative. Diagnostic Study Results     Labs -   No results found for this or any previous visit (from the past 12 hour(s)).     Radiologic Studies -   No orders to display     CT Results  (Last 48 hours)    None        CXR Results  (Last 48 hours)    None            Medical Decision Making   I am the first provider for this patient. I reviewed the vital signs, available nursing notes, past medical history, past surgical history, family history and social history. Vital Signs-Reviewed the patient's vital signs. Patient Vitals for the past 12 hrs:   Temp Pulse Resp BP SpO2   05/31/22 1926 98.4 °F (36.9 °C) 79 19 111/69 98 %       Records Reviewed: Nursing Notes and Old Medical Records    Provider Notes (Medical Decision Making): Wound is healing well. Advised on continued wound care at home. Follow-up with PCP. ED Course:   Initial assessment performed. The patients presenting problems have been discussed, and they are in agreement with the care plan formulated and outlined with them. I have encouraged them to ask questions as they arise throughout their visit. Critical Care Time: None    Disposition:  dc    PLAN:  1. Current Discharge Medication List        2. Follow-up Information     Follow up With Specialties Details Why [address] [facility] EMERGENCY DEPT Emergency Medicine  As needed, If symptoms worsen [address] [facility]    Edwin Alvarado MD Family Medicine Call  For follow up [facility] [phone]          Return to ED if worse     Diagnosis     Clinical Impression:   1. Encounter for wound re-check          Please note that this dictation was completed with [employer], the computer voice recognition software. Quite often unanticipated grammatical, syntax, homophones, and other interpretive errors are inadvertently transcribed by the computer software. Please disregards these errors. Please excuse any errors that have escaped final proofreading.

## 2022-07-11 NOTE — ED PROVIDER NOTES
".mOutpatient Psychiatry Follow-Up Visit (MD/NP)    7/11/2022     The patient location is: [facility]  The chief complaint leading to consultation is: depression, ADHD, anxious    Visit type: audiovisual    Face to Face time with patient: 8 minutes  10 minutes of total time spent on the encounter, which includes face to face time and non-face to face time preparing to see the patient (eg, review of tests), Obtaining and/or reviewing separately obtained history, Documenting clinical information in the electronic or other health record, Independently interpreting results (not separately reported) and communicating results to the patient/family/caregiver, or Care coordination (not separately reported).     Each patient to whom he or she provides medical services by telemedicine is:  (1) informed of the relationship between the physician and patient and the respective role of any other health care provider with respect to management of the patient; and (2) notified that he or she may decline to receive medical services by telemedicine and may withdraw from such care at any time.    Clinical Status of Patient:  Outpatient (Ambulatory)    Chief Complaint:  Joy Crawford is a 54 y.o. female who presents today for follow-up of depression, anxiety and attention problems.  Met with patient.      Interval History and Content of Current Session:  Interim Events/Subjective Report/Content of Current Session:   Joy Crawford checked in on time for her appointment. Presents for follow-up. Last visit we adjusted Wellbutrin formulation. Today she reports "I have seen a difference. The panic attacks her under control." She has reduced her Buspar to once daily. She reports having another surgery 4 days ago - "one thing after another." [facility] [place] two houses. She offers that she knows her current circumstance could be effecting her emotionally but still desires to restart Vyvanse- feels "so unmotivated."  Denies " EMERGENCY DEPARTMENT HISTORY AND PHYSICAL EXAM      Date: 6/23/2021  Patient Name: Omari Bryan    History of Presenting Illness     Chief Complaint   Patient presents with    Eye Pain     pain in L eye and what appears to be a stye on the bottom lid       History Provided By: Patient    HPI: Omari Bryan, 27 y.o. female without significant PMHx, presents by POV  to the ED with cc of pain and swelling to the right lower eyelid. The notes that the pain started 2 days ago. She was seen at the [facility] ED yesterday and had a negative wood's lamp exam. The patient notes that yesterday she felt that something was in her eye. This sensation has resolved but she awoke today with pain and swelling to the medial aspect of the left lower eyelid. There has been no treatment PTA. She denies change in her vision. There are no other complaints, changes, or physical findings at this time. Social Hx: Tobacco (denies), EtOH (denies), Illicit drug use (denies)     PCP: Vicenta Oglesby MD    No current facility-administered medications on file prior to encounter. Current Outpatient Medications on File Prior to Encounter   Medication Sig Dispense Refill    [DISCONTINUED] levonorgestrel-ethin estradiol (LARISSIA PO) Take  by mouth. (Patient not taking: Reported on 6/22/2021)      [DISCONTINUED] ALBUTEROL IN Take  by inhalation.          Past History     Past Medical History:  Past Medical History:   Diagnosis Date    Asthma        Past Surgical History:  Past Surgical History:   Procedure Laterality Date    HX ACL RECONSTRUCTION Left 2012    HX ORTHOPAEDIC  2012    left ACL repaired       Family History:  Family History   Problem Relation Age of Onset    No Known Problems Mother     Cancer Father     No Known Problems Sister     No Known Problems Brother     Cancer Maternal Grandmother     Cancer Maternal Grandfather     Cancer Paternal Grandmother     Cancer Paternal Grandfather        Social History:  Social History     Tobacco Use    Smoking status: Never Smoker    Smokeless tobacco: Never Used   Substance Use Topics    Alcohol use: No    Drug use: No       Allergies: Allergies   Allergen Reactions    Latex Other (comments)     Skin irritation    Amoxicillin Itching    Pcn [Penicillins] Hives    Shellfish Derived Itching         Review of Systems   Review of Systems   Constitutional: Negative for chills, diaphoresis and fever. HENT: Negative for congestion, ear pain, rhinorrhea and sore throat. Eyes:        + eyelid pain   Respiratory: Negative for cough and shortness of breath. Cardiovascular: Negative for chest pain. Gastrointestinal: Negative for abdominal pain, constipation, diarrhea, nausea and vomiting. Genitourinary: Negative for difficulty urinating, dysuria, frequency and hematuria. Musculoskeletal: Negative for arthralgias and myalgias. Neurological: Negative for headaches. All other systems reviewed and are negative. Physical Exam   Physical Exam  Vitals and nursing note reviewed. Constitutional:       General: She is not in acute distress. Appearance: She is well-developed. She is not diaphoretic. Comments: 27 y.o. -American female    HENT:      Head: Normocephalic and atraumatic. Eyes:      General:         Right eye: No discharge or hordeolum. Left eye: Hordeolum present. No discharge. Conjunctiva/sclera: Conjunctivae normal.   Cardiovascular:      Rate and Rhythm: Normal rate. Pulmonary:      Effort: Pulmonary effort is normal.   Musculoskeletal:      Cervical back: Normal range of motion and neck supple. Skin:     General: Skin is warm and dry. Neurological:      Mental Status: She is alert and oriented to person, place, and time.    Psychiatric:         Behavior: Behavior normal.         Diagnostic Study Results     Labs - none    Radiologic Studies - none    Medical Decision Making   I am the first provider for this SI/HI/AVH. No objective s/sx of psychosis or guillermo. Patient verbalized motivation for compliance with medications and all other elements of treatment plan.     Previous medication trials:  Gabapentin - nausea  90 mg of Cymbalta - made symptoms worse, cried for 3 days  Wellbutrin- caused insomnia with XL  Zoloft- ineffective  Abilify- insomnia    Unsure if she has tried Effexor or Pristiq  Review of Systems   · PSYCHIATRIC: Pertinant items are noted in the narrative.  · CONSTITUTIONAL: No weight gain or loss.   · MUSCULOSKELETAL: No pain or stiffness of the joints.  · RESPIRATORY: No shortness of breath.  · CARDIOVASCULAR: No tachycardia or chest pain.  · GASTROINTESTINAL: No nausea, vomiting, pain, constipation or diarrhea.    Past Medical, Family and Social History: The patient's past medical, family and social history have been reviewed and updated as appropriate within the electronic medical record - see encounter notes.    Compliance: yes    Side effects: None    Risk Parameters:  Patient reports no suicidal ideation  Patient reports no homicidal ideation  Patient reports no self-injurious behavior  Patient reports no violent behavior    Exam (detailed: at least 9 elements; comprehensive: all 15 elements)   Constitutional  Vitals:    There were no vitals filed for this visit.     General:  unremarkable, age appropriate     Musculoskeletal  Muscle Strength/Tone:  not examined   Gait & Station:  BRIANNE due to video visit      Psychiatric  Speech:  no latency; no press   Mood & Affect:  unmotivated  congruent and appropriate   Thought Process:  normal and logical   Associations:  intact   Thought Content:  normal, no suicidality, no homicidality, delusions, or paranoia   Insight:  intact   Judgement: behavior is adequate to circumstances   Orientation:  grossly intact   Memory: intact for content of interview   Language: grossly intact   Attention Span & Concentration:  able to focus   Fund of Knowledge:  intact and  appropriate to age and level of education     Assessment and Diagnosis   Status/Progress: Based on the examination today, the patient's problem(s) is/are inadequately controlled.  New problems have not been presented today.   Co-morbidities, Diagnostic uncertainty and Lack of compliance are not complicating management of the primary condition.  There are no active rule-out diagnoses for this patient at this time.     General Impression: Joy Crawford is a 53 y.o. female who presents today for follow-up of depression and anxiety. Patient seen and chart reviewed. Previous patient of Dr. Mckeon - no medication changes made. Presents 4/21/21- anxiety and motivation remain an issues, Abilify started. Presents 7/12/21 failed Abilify, wants to restart Vyvanse Presents 10/26/21- anxiety improved with Buspar, never started Vyvanse, Wellbutrin added for depression Presents 5/30/22- Wellbutrin switched to SR due to insomnia on XL Presents 7/11/22- insomnia resolved, Vyvanse restarted       ICD-10-CM ICD-9-CM   1. ADHD (attention deficit hyperactivity disorder), inattentive type  F90.0 314.00   2. Adjustment disorder with mixed anxiety and depressed mood  F43.23 309.28   3. Recurrent major depressive disorder, in partial remission  F33.41 296.35       Intervention/Counseling/Treatment Plan   · Medication Management: Continue current medications.   · Cymbalta 60 mg daily  · Restart Vyvanse 30 mg daily   Patient has no contraindications: no h/o allergic rxn, agitation, anxiety, tourette's, arrythmia, cardiovascular disease, cardiac structural abnormalities, hyperthyroidism, glaucoma, Other psychiatric illness, etc.   Discussed diagnosis, risks and benefits of proposed treatment vs alternative treatments vs no treatment, and potential side effects of these treatments (death, dependency, psychosis, guillermo, aggression, HTN, ticks, MI, stroke, arrythmia, seizure, anaphylaxis or other allergic reactions, leukopenia, nervousness,  patient. I reviewed the vital signs, available nursing notes, past medical history, past surgical history, family history and social history. Vital Signs-Reviewed the patient's vital signs. Patient Vitals for the past 12 hrs:   Temp Pulse Resp BP SpO2   06/23/21 1500 98.1 °F (36.7 °C) 64 14 (!) 111/59 99 %       Records Reviewed: Nursing Notes and Old Medical Records   Reviewed records from last night at [facility]. Provider Notes (Medical Decision Making): The patient presents ED with stable vital signs for pain to the left lower eyelid. Differential diagnosis include blepharitis, stye, hordeolum, insect bite. Will treat with antibiotic ointment and warm compresses. Patient should follow-up as needed. ED Course:   Initial assessment performed. The patients presenting problems have been discussed, and they are in agreement with the care plan formulated and outlined with them. I have encouraged them to ask questions as they arise throughout their visit. Critical Care Time: None    Disposition:  DISCHARGE NOTE:  3:19 PM  The pt is ready for discharge. The pt's signs, symptoms, diagnosis, and discharge instructions have been discussed and pt has conveyed their understanding. The pt is to follow up as recommended or return to ER should their symptoms worsen. Plan has been discussed and pt is in agreement. PLAN:  1. Current Discharge Medication List      START taking these medications    Details   erythromycin (ILOTYCIN) ophthalmic ointment Apply to the left lower eyelid 3-4 times daily. Qty: 3.5 g, Refills: 0  Start date: 6/23/2021, End date: 6/30/2021           2. Follow-up Information     Follow up With Specialties Details Why Contact Info    daniel Lai MD Family Medicine In 1 week As needed ite [facility] [phone] [id number] [phone]        3. Apply warm compresses as discussed. Return to ED if worse     Diagnosis     Clinical Impression:   1.  Hordeolum anorexia, insomnia, tachycardia, palpitations, dizziness, BP changes, HR changes, visual disturbance, etc.). The patient expresses understanding of the above and displays the capacity to agree with this treatment given said understanding. Patient also agrees that, currently, the benefits outweigh the risks and would like to pursue treatment at this time.  · Wellbutrin  mg daily  · Buspar 10 mg BID  · Trazodone 150 mg nightly - from sleep medicine.   · The risks and benefits of medication were discussed with the patient.  · Discussed diagnosis, risks and benefits of proposed treatment above vs alternative treatments vs no treatment, and potential side effects of these treatments. The patient expresses understanding of the above and displays the capacity to agree with this treatment given said understanding. Patient also agrees that, currently, the benefits outweigh the risks and would like to pursue treatment at this time.  · Discussed inherent unpredictability of medications in each individual.   · Encouraged Patient to keep future appointments.   · Take medications as prescribed and abstain from substance abuse.   · In the event of an emergency patient was advised to go to the emergency room      Return to Clinic: 6 weeks    Angelina Lim DNP-BC PMHNP  [facility]      externum of left lower eyelid          Please note that this dictation was completed with [employer], the computer voice recognition software. Quite often unanticipated grammatical, syntax, homophones, and other interpretive errors are inadvertently transcribed by the computer software. Please disregards these errors. Please excuse any errors that have escaped final proofreading.

## 2022-08-09 ENCOUNTER — HOSPITAL ENCOUNTER (EMERGENCY)
Age: 27
Discharge: HOME | End: 2022-08-09
Attending: EMERGENCY MEDICINE
Payer: COMMERCIAL

## 2022-08-09 VITALS
OXYGEN SATURATION: 100 % | HEIGHT: 68 IN | SYSTOLIC BLOOD PRESSURE: 120 MMHG | HEART RATE: 89 BPM | DIASTOLIC BLOOD PRESSURE: 72 MMHG | RESPIRATION RATE: 14 BRPM | WEIGHT: 135 LBS | BODY MASS INDEX: 20.46 KG/M2 | TEMPERATURE: 98.2 F

## 2022-08-09 DIAGNOSIS — R10.9 ACUTE ABDOMINAL PAIN: Primary | ICD-10-CM

## 2022-08-09 LAB
ALBUMIN SERPL-MCNC: 4 G/DL (ref 3.5–5)
ALBUMIN/GLOB SERPL: 1.2 {RATIO} (ref 1.1–2.2)
ALP SERPL-CCNC: 79 U/L (ref 45–117)
ALT SERPL-CCNC: 11 U/L (ref 12–78)
ANION GAP SERPL CALC-SCNC: 9 MMOL/L (ref 5–15)
APPEARANCE UR: CLEAR
AST SERPL-CCNC: 13 U/L (ref 15–37)
BACTERIA URNS QL MICRO: NEGATIVE /HPF
BASOPHILS # BLD: 0 K/UL (ref 0–0.1)
BASOPHILS NFR BLD: 0 % (ref 0–1)
BILIRUB SERPL-MCNC: 0.2 MG/DL (ref 0.2–1)
BILIRUB UR QL: NEGATIVE
BUN SERPL-MCNC: 12 MG/DL (ref 6–20)
BUN/CREAT SERPL: 12 (ref 12–20)
CALCIUM SERPL-MCNC: 9.1 MG/DL (ref 8.5–10.1)
CHLORIDE SERPL-SCNC: 104 MMOL/L (ref 97–108)
CO2 SERPL-SCNC: 29 MMOL/L (ref 21–32)
COLOR UR: ABNORMAL
CREAT SERPL-MCNC: 0.98 MG/DL (ref 0.55–1.02)
DIFFERENTIAL METHOD BLD: ABNORMAL
EOSINOPHIL # BLD: 0.1 K/UL (ref 0–0.4)
EOSINOPHIL NFR BLD: 1 % (ref 0–7)
EPITH CASTS URNS QL MICRO: ABNORMAL /LPF
ERYTHROCYTE [DISTWIDTH] IN BLOOD BY AUTOMATED COUNT: 13 % (ref 11.5–14.5)
GLOBULIN SER CALC-MCNC: 3.4 G/DL (ref 2–4)
GLUCOSE SERPL-MCNC: 87 MG/DL (ref 65–100)
GLUCOSE UR STRIP.AUTO-MCNC: NEGATIVE MG/DL
HCG UR QL: NEGATIVE
HCT VFR BLD AUTO: 39.3 % (ref 35–47)
HGB BLD-MCNC: 12.4 G/DL (ref 11.5–16)
HGB UR QL STRIP: NEGATIVE
IMM GRANULOCYTES # BLD AUTO: 0 K/UL (ref 0–0.04)
IMM GRANULOCYTES NFR BLD AUTO: 0 % (ref 0–0.5)
KETONES UR QL STRIP.AUTO: ABNORMAL MG/DL
LEUKOCYTE ESTERASE UR QL STRIP.AUTO: NEGATIVE
LIPASE SERPL-CCNC: 87 U/L (ref 73–393)
LYMPHOCYTES # BLD: 2.1 K/UL (ref 0.8–3.5)
LYMPHOCYTES NFR BLD: 16 % (ref 12–49)
MCH RBC QN AUTO: 29.2 PG (ref 26–34)
MCHC RBC AUTO-ENTMCNC: 31.6 G/DL (ref 30–36.5)
MCV RBC AUTO: 92.5 FL (ref 80–99)
MONOCYTES # BLD: 1.3 K/UL (ref 0–1)
MONOCYTES NFR BLD: 9 % (ref 5–13)
MUCOUS THREADS URNS QL MICRO: ABNORMAL /LPF
NEUTS SEG # BLD: 9.8 K/UL (ref 1.8–8)
NEUTS SEG NFR BLD: 74 % (ref 32–75)
NITRITE UR QL STRIP.AUTO: NEGATIVE
NRBC # BLD: 0 K/UL (ref 0–0.01)
NRBC BLD-RTO: 0 PER 100 WBC
PH UR STRIP: 5.5 [PH] (ref 5–8)
PLATELET # BLD AUTO: 345 K/UL (ref 150–400)
PMV BLD AUTO: 10.3 FL (ref 8.9–12.9)
POTASSIUM SERPL-SCNC: 3.4 MMOL/L (ref 3.5–5.1)
PROT SERPL-MCNC: 7.4 G/DL (ref 6.4–8.2)
PROT UR STRIP-MCNC: NEGATIVE MG/DL
RBC # BLD AUTO: 4.25 M/UL (ref 3.8–5.2)
RBC #/AREA URNS HPF: ABNORMAL /HPF (ref 0–5)
SODIUM SERPL-SCNC: 142 MMOL/L (ref 136–145)
SP GR UR REFRACTOMETRY: 1.03 (ref 1–1.03)
UA: UC IF INDICATED,UAUC: ABNORMAL
UROBILINOGEN UR QL STRIP.AUTO: 1 EU/DL (ref 0.2–1)
WBC # BLD AUTO: 13.4 K/UL (ref 3.6–11)
WBC URNS QL MICRO: ABNORMAL /HPF (ref 0–4)

## 2022-08-09 PROCEDURE — 81001 URINALYSIS AUTO W/SCOPE: CPT

## 2022-08-09 PROCEDURE — 96374 THER/PROPH/DIAG INJ IV PUSH: CPT

## 2022-08-09 PROCEDURE — 83690 ASSAY OF LIPASE: CPT

## 2022-08-09 PROCEDURE — 81025 URINE PREGNANCY TEST: CPT

## 2022-08-09 PROCEDURE — 99284 EMERGENCY DEPT VISIT MOD MDM: CPT

## 2022-08-09 PROCEDURE — 36415 COLL VENOUS BLD VENIPUNCTURE: CPT

## 2022-08-09 PROCEDURE — 85025 COMPLETE CBC W/AUTO DIFF WBC: CPT

## 2022-08-09 PROCEDURE — 74011250636 HC RX REV CODE- 250/636: Performed by: EMERGENCY MEDICINE

## 2022-08-09 PROCEDURE — 80053 COMPREHEN METABOLIC PANEL: CPT

## 2022-08-09 RX ORDER — DICYCLOMINE HYDROCHLORIDE 20 MG/1
20 TABLET ORAL
Qty: 21 TABLET | Refills: 0 | Status: SHIPPED | OUTPATIENT
Start: 2022-08-09 | End: 2022-08-16

## 2022-08-09 RX ORDER — FAMOTIDINE 20 MG/1
20 TABLET, FILM COATED ORAL 2 TIMES DAILY
Qty: 14 TABLET | Refills: 0 | OUTPATIENT
Start: 2022-08-09 | End: 2022-09-17

## 2022-08-09 RX ORDER — KETOROLAC TROMETHAMINE 30 MG/ML
30 INJECTION, SOLUTION INTRAMUSCULAR; INTRAVENOUS
Status: COMPLETED | OUTPATIENT
Start: 2022-08-09 | End: 2022-08-09

## 2022-08-09 RX ADMIN — KETOROLAC TROMETHAMINE 30 MG: 30 INJECTION, SOLUTION INTRAMUSCULAR at 20:34

## 2022-08-09 NOTE — Clinical Note
[facility] EMERGENCY DEPT  [id number] [id number] [phone]    Work/School Note    Date: 8/9/2022    To Whom It May concern:    Safia Oswald was seen and treated today in the emergency room by the following provider(s):  Attending Provider: Ginette Jaramillo MD.      Safia Oswald is excused from work/school on 08/09/22 and 08/10/22. She is medically clear to return to work/school on 8/11/2022.        Sincerely,          Radha Valencia MD

## 2022-08-09 NOTE — ED TRIAGE NOTES
Patient presents to the ED with c/o stabbing mid abdominal pain that started sunday night after eating chicken. Denies N/V/D. Denies alleviating or aggravating factors.

## 2022-08-10 NOTE — ED NOTES
Discharge instructions were given to the patient by Ana Belle RN. The patient left the Emergency Department ambulatory, alert and oriented and in no acute distress with 2 prescriptions. The patient was encouraged to call or return to the ED for worsening issues or problems and was encouraged to schedule a follow up appointment for continuing care. The patient verbalized understanding of discharge instructions and prescriptions, all questions were answered. The patient has no further concerns at this time.

## 2022-08-10 NOTE — DISCHARGE INSTRUCTIONS
You were seen in the ER for your symptoms. Please follow-up with your primary care doctor. Please take the medicine famotidine and Bentyl to help with symptoms. As we discussed, we did not perform a CT scan today, however if your pain worsens, your pain moves to your right lower side or you cannot eat or drink due to nausea or you develop fever, do not hesitate to return to the ER for for a CAT scan.

## 2022-08-10 NOTE — ED NOTES
Pt presents to ED ambulatory complaining of abd pain x sunday. Pt is alert and oriented x 4, RR even and unlabored, skin is warm and dry. Assessment completed and pt updated on plan of care. Call bell in reach. Emergency Department Nursing Plan of Care       The Nursing Plan of Care is developed from the Nursing assessment and Emergency Department Attending provider initial evaluation. The plan of care may be reviewed in the ED Provider note.     The Plan of Care was developed with the following considerations:   Patient / Family readiness to learn indicated by:verbalized understanding  Persons(s) to be included in education: patient  Barriers to Learning/Limitations:No    Signed     Carmona Jordana    8/9/2022   8:26 PM

## 2022-08-10 NOTE — ED PROVIDER NOTES
EMERGENCY DEPARTMENT HISTORY AND PHYSICAL EXAM      Date: 8/9/2022  Patient Name: Cesar Sarkar    History of Presenting Illness     Chief Complaint   Patient presents with    Abdominal Pain       History Provided By: Patient    HPI: Cesar Sarkar, 32 y.o. female presents to the ED with cc of abdominal pain. 32 YOF with the below past medical history presents emergency department with a chief complaint of abdominal pain. Patient reports symptoms began 2 days ago. She reports eating chicken prior. Patient reports an intermittent \"stabbing\" periumbilical abdominal pain. It is intermittent, worse with eating and not resolved with Pepto-Bismol. Denies any nausea or vomiting. Did have a bowel movement yesterday which was normal.  No diarrhea. Patient denies any fevers or chills, chest pain, cough or shortness of breath. Does report mild headache. Denies any urinary symptoms or vaginal discharge. There are no other complaints, changes, or physical findings at this time. PCP: Merary Polanco MD    No current facility-administered medications on file prior to encounter. Current Outpatient Medications on File Prior to Encounter   Medication Sig Dispense Refill    ibuprofen (MOTRIN) 600 mg tablet Take 1 Tablet by mouth every six (6) hours as needed for Pain. 20 Tablet 0    acetaminophen (Tylenol Extra Strength) 500 mg tablet Take 2 Tablets by mouth every six (6) hours as needed for Pain. 20 Tablet 0    lidocaine (LIDODERM) 5 % Apply patch to the affected area for 12 hours a day and remove for 12 hours a day.  5 Each 0       Past History     Past Medical History:  Past Medical History:   Diagnosis Date    Asthma        Past Surgical History:  Past Surgical History:   Procedure Laterality Date    HX ACL RECONSTRUCTION Left 2012    HX ORTHOPAEDIC  2012    left ACL repaired       Family History:  Family History   Problem Relation Age of Onset    No Known Problems Mother     Cancer Father No Known Problems Sister     No Known Problems Brother     Cancer Maternal Grandmother     Cancer Maternal Grandfather     Cancer Paternal Grandmother     Cancer Paternal Grandfather        Social History:  Social History     Tobacco Use    Smoking status: Never    Smokeless tobacco: Never   Substance Use Topics    Alcohol use: No    Drug use: No       Allergies: Allergies   Allergen Reactions    Latex Other (comments)     Skin irritation    Amoxicillin Itching    Pcn [Penicillins] Hives    Shellfish Derived Itching         Review of Systems   Review of Systems   Constitutional:  Negative for activity change, chills and fever. HENT:  Negative for facial swelling and voice change. Eyes:  Negative for redness. Respiratory:  Negative for cough, shortness of breath and wheezing. Cardiovascular:  Negative for chest pain and leg swelling. Gastrointestinal:  Positive for abdominal pain. Negative for diarrhea, nausea and vomiting. Genitourinary:  Negative for decreased urine volume. Musculoskeletal:  Negative for gait problem. Skin:  Negative for pallor and rash. Neurological:  Positive for headaches. Negative for tremors and facial asymmetry. Psychiatric/Behavioral:  Negative for agitation. All other systems reviewed and are negative. Physical Exam   Physical Exam  Vitals and nursing note reviewed. Constitutional:       Comments: 77-year-old female, resting bed, no acute distress   HENT:      Head: Normocephalic and atraumatic. Cardiovascular:      Rate and Rhythm: Normal rate and regular rhythm. Heart sounds: No murmur heard. No friction rub. No gallop. Pulmonary:      Effort: Pulmonary effort is normal.      Breath sounds: Normal breath sounds. Abdominal:      General: Abdomen is flat. Bowel sounds are normal. There is no distension. Palpations: Abdomen is soft. Tenderness: There is abdominal tenderness in the epigastric area and left upper quadrant.  There is no right CVA tenderness, left CVA tenderness, guarding or rebound. Musculoskeletal:         General: Normal range of motion. Cervical back: Normal range of motion. Skin:     General: Skin is warm. Capillary Refill: Capillary refill takes less than 2 seconds. Neurological:      General: No focal deficit present. Mental Status: She is alert. Psychiatric:         Mood and Affect: Mood normal.       Diagnostic Study Results     Labs -     Recent Results (from the past 12 hour(s))   CBC WITH AUTOMATED DIFF    Collection Time: 08/09/22  8:33 PM   Result Value Ref Range    WBC 13.4 (H) 3.6 - 11.0 K/uL    RBC 4.25 3.80 - 5.20 M/uL    HGB 12.4 11.5 - 16.0 g/dL    HCT 39.3 35.0 - 47.0 %    MCV 92.5 80.0 - 99.0 FL    MCH 29.2 26.0 - 34.0 PG    MCHC 31.6 30.0 - 36.5 g/dL    RDW 13.0 11.5 - 14.5 %    PLATELET 218 155 - 849 K/uL    MPV 10.3 8.9 - 12.9 FL    NRBC 0.0 0  WBC    ABSOLUTE NRBC 0.00 0.00 - 0.01 K/uL    NEUTROPHILS 74 32 - 75 %    LYMPHOCYTES 16 12 - 49 %    MONOCYTES 9 5 - 13 %    EOSINOPHILS 1 0 - 7 %    BASOPHILS 0 0 - 1 %    IMMATURE GRANULOCYTES 0 0.0 - 0.5 %    ABS. NEUTROPHILS 9.8 (H) 1.8 - 8.0 K/UL    ABS. LYMPHOCYTES 2.1 0.8 - 3.5 K/UL    ABS. MONOCYTES 1.3 (H) 0.0 - 1.0 K/UL    ABS. EOSINOPHILS 0.1 0.0 - 0.4 K/UL    ABS. BASOPHILS 0.0 0.0 - 0.1 K/UL    ABS. IMM.  GRANS. 0.0 0.00 - 0.04 K/UL    DF AUTOMATED     METABOLIC PANEL, COMPREHENSIVE    Collection Time: 08/09/22  8:33 PM   Result Value Ref Range    Sodium 142 136 - 145 mmol/L    Potassium 3.4 (L) 3.5 - 5.1 mmol/L    Chloride 104 97 - 108 mmol/L    CO2 29 21 - 32 mmol/L    Anion gap 9 5 - 15 mmol/L    Glucose 87 65 - 100 mg/dL    BUN 12 6 - 20 MG/DL    Creatinine 0.98 0.55 - 1.02 MG/DL    BUN/Creatinine ratio 12 12 - 20      GFR est AA >60 >60 ml/min/1.73m2    GFR est non-AA >60 >60 ml/min/1.73m2    Calcium 9.1 8.5 - 10.1 MG/DL    Bilirubin, total 0.2 0.2 - 1.0 MG/DL    ALT (SGPT) 11 (L) 12 - 78 U/L    AST (SGOT) 13 (L) 15 - 37 U/L    Alk. phosphatase 79 45 - 117 U/L    Protein, total 7.4 6.4 - 8.2 g/dL    Albumin 4.0 3.5 - 5.0 g/dL    Globulin 3.4 2.0 - 4.0 g/dL    A-G Ratio 1.2 1.1 - 2.2     LIPASE    Collection Time: 08/09/22  8:33 PM   Result Value Ref Range    Lipase 87 73 - 393 U/L   URINALYSIS W/ REFLEX CULTURE    Collection Time: 08/09/22  8:33 PM    Specimen: Urine   Result Value Ref Range    Color YELLOW/STRAW      Appearance CLEAR CLEAR      Specific gravity 1.030 1.003 - 1.030      pH (UA) 5.5 5.0 - 8.0      Protein Negative NEG mg/dL    Glucose Negative NEG mg/dL    Ketone TRACE (A) NEG mg/dL    Bilirubin Negative NEG      Blood Negative NEG      Urobilinogen 1.0 0.2 - 1.0 EU/dL    Nitrites Negative NEG      Leukocyte Esterase Negative NEG      WBC 0-4 0 - 4 /hpf    RBC 0-5 0 - 5 /hpf    Epithelial cells MODERATE (A) FEW /lpf    Bacteria Negative NEG /hpf    UA:UC IF INDICATED CULTURE NOT INDICATED BY UA RESULT CNI      Mucus 2+ (A) NEG /lpf   HCG URINE, QL. - POC    Collection Time: 08/09/22  8:37 PM   Result Value Ref Range    Pregnancy test,urine (POC) Negative NEG         Radiologic Studies -   No orders to display     CT Results  (Last 48 hours)      None          CXR Results  (Last 48 hours)      None            Medical Decision Making   I am the first provider for this patient. I reviewed the vital signs, available nursing notes, past medical history, past surgical history, family history and social history. Vital Signs-Reviewed the patient's vital signs. Patient Vitals for the past 12 hrs:   Temp Pulse Resp BP SpO2   08/09/22 1955 98.2 °F (36.8 °C) 89 14 120/72 100 %     Records Reviewed: Nursing Notes and Old Medical Records    Provider Notes (Medical Decision Making):    69-year-old female presents emergency department with abdominal pain. Vitals are unremarkable. Abdominal exam is fairly benign. Mild tenderness but no signs of peritonitis. We will check basic labs.   Given epigastric, left upper quadrant and periumbilical discomfort, most suspicious for gastritis or enteritis. Doubt cholecystitis given no right upper quadrant tenderness. I will check labs, however my suspicion for appendicitis is low as there is no right lower quadrant tenderness. Will check urinalysis, doubt PID or ovarian torsion clinically. ED Course:   Initial assessment performed. The patients presenting problems have been discussed, and they are in agreement with the care plan formulated and outlined with them. I have encouraged them to ask questions as they arise throughout their visit. ED Course as of 08/09/22 2247   Tue Aug 09, 2022   2116 Labs reviewed, mild leukocytosis, this is non-specific, she appears well, afebrile. CMP unremarkable. Urinalysis unremarkable, lipase reassuring. Will reassess patient [MB]   2122 I reassessed the patient, she reports she feels better. I performed a serial exam.  No focal tenderness. Normal bowel sounds, no right lower quadrant tenderness. I discussed my medical decision making with the patient. At this time, my suspicion for acute appendicitis is low and using shared decision-making, patient is comfortable foregoing CT scan. However I did state that sometimes early appendicitis can mimic gastritis/duodenitis/gastroenteritis and discussed return precautions. I discussed we will trial famotidine and Bentyl. I advised PCP follow-up, discussed reasons to return to the emergency department including fever, anorexia, or pain moving to the right lower quadrant. Patient expressed understanding and understand that she may come to the emergency department at any time and is agreeable to plan above. [MB]      ED Course User Index  [MB] MD Keesha Strong MD      Disposition:    Discharged    DISCHARGE PLAN:  1.    Discharge Medication List as of 8/9/2022  9:25 PM        START taking these medications    Details   dicyclomine (BENTYL) 20 mg tablet Take 1 Tablet by mouth every six to eight (6-8) hours as needed for Abdominal Cramps for up to 7 days. , Normal, Disp-21 Tablet, R-0      famotidine (PEPCID) 20 mg tablet Take 1 Tablet by mouth two (2) times a day., Normal, Disp-14 Tablet, R-0           CONTINUE these medications which have NOT CHANGED    Details   ibuprofen (MOTRIN) 600 mg tablet Take 1 Tablet by mouth every six (6) hours as needed for Pain., Normal, Disp-20 Tablet, R-0      acetaminophen (Tylenol Extra Strength) 500 mg tablet Take 2 Tablets by mouth every six (6) hours as needed for Pain., Normal, Disp-20 Tablet, R-0      lidocaine (LIDODERM) 5 % Apply patch to the affected area for 12 hours a day and remove for 12 hours a day., Normal, Disp-5 Each, R-0           2. Follow-up Information       Follow up With Specialties Details Why Contact Info    daniel Mcclain MD Family Medicine In 3 days  [address]      [facility] [place] EMERGENCY DEPT Emergency Medicine  If symptoms worsen [address]          3. Return to ED if worse     Diagnosis     Clinical Impression:   1. Acute abdominal pain        Attestations:    Alesia Bates MD        Please note that this dictation was completed with [employer], the computer voice recognition software. Quite often unanticipated grammatical, syntax, homophones, and other interpretive errors are inadvertently transcribed by the computer software. Please disregard these errors. Please excuse any errors that have escaped final proofreading. Thank you.

## 2022-09-16 ENCOUNTER — HOSPITAL ENCOUNTER (EMERGENCY)
Age: 27
Discharge: STILL ADMITTED OR ADMINISTRATIVE | End: 2022-09-16

## 2022-09-16 ENCOUNTER — APPOINTMENT (OUTPATIENT)
Dept: GENERAL RADIOLOGY | Age: 27
End: 2022-09-16
Attending: STUDENT IN AN ORGANIZED HEALTH CARE EDUCATION/TRAINING PROGRAM
Payer: COMMERCIAL

## 2022-09-16 VITALS
RESPIRATION RATE: 16 BRPM | DIASTOLIC BLOOD PRESSURE: 68 MMHG | HEIGHT: 68 IN | BODY MASS INDEX: 21.53 KG/M2 | HEART RATE: 63 BPM | TEMPERATURE: 98 F | WEIGHT: 142.06 LBS | OXYGEN SATURATION: 100 % | SYSTOLIC BLOOD PRESSURE: 117 MMHG

## 2022-09-16 LAB
ALBUMIN SERPL-MCNC: 4 G/DL (ref 3.5–5)
ALBUMIN/GLOB SERPL: 1.2 {RATIO} (ref 1.1–2.2)
ALP SERPL-CCNC: 75 U/L (ref 45–117)
ALT SERPL-CCNC: 17 U/L (ref 12–78)
ANION GAP SERPL CALC-SCNC: 3 MMOL/L (ref 5–15)
AST SERPL-CCNC: 16 U/L (ref 15–37)
BASOPHILS # BLD: 0.1 K/UL (ref 0–0.1)
BASOPHILS NFR BLD: 1 % (ref 0–1)
BILIRUB SERPL-MCNC: 0.3 MG/DL (ref 0.2–1)
BNP SERPL-MCNC: 29 PG/ML
BUN SERPL-MCNC: 13 MG/DL (ref 6–20)
BUN/CREAT SERPL: 11 (ref 12–20)
CALCIUM SERPL-MCNC: 9 MG/DL (ref 8.5–10.1)
CHLORIDE SERPL-SCNC: 109 MMOL/L (ref 97–108)
CO2 SERPL-SCNC: 28 MMOL/L (ref 21–32)
CREAT SERPL-MCNC: 1.16 MG/DL (ref 0.55–1.02)
DIFFERENTIAL METHOD BLD: ABNORMAL
EOSINOPHIL # BLD: 0.1 K/UL (ref 0–0.4)
EOSINOPHIL NFR BLD: 1 % (ref 0–7)
ERYTHROCYTE [DISTWIDTH] IN BLOOD BY AUTOMATED COUNT: 13.2 % (ref 11.5–14.5)
GLOBULIN SER CALC-MCNC: 3.3 G/DL (ref 2–4)
GLUCOSE SERPL-MCNC: 94 MG/DL (ref 65–100)
HCT VFR BLD AUTO: 36.9 % (ref 35–47)
HGB BLD-MCNC: 12.2 G/DL (ref 11.5–16)
IMM GRANULOCYTES # BLD AUTO: 0 K/UL (ref 0–0.04)
IMM GRANULOCYTES NFR BLD AUTO: 0 % (ref 0–0.5)
LYMPHOCYTES # BLD: 3.6 K/UL (ref 0.8–3.5)
LYMPHOCYTES NFR BLD: 30 % (ref 12–49)
MCH RBC QN AUTO: 30.3 PG (ref 26–34)
MCHC RBC AUTO-ENTMCNC: 33.1 G/DL (ref 30–36.5)
MCV RBC AUTO: 91.8 FL (ref 80–99)
MONOCYTES # BLD: 0.9 K/UL (ref 0–1)
MONOCYTES NFR BLD: 7 % (ref 5–13)
NEUTS SEG # BLD: 7.2 K/UL (ref 1.8–8)
NEUTS SEG NFR BLD: 60 % (ref 32–75)
NRBC # BLD: 0 K/UL (ref 0–0.01)
NRBC BLD-RTO: 0 PER 100 WBC
PLATELET # BLD AUTO: 351 K/UL (ref 150–400)
PMV BLD AUTO: 9.9 FL (ref 8.9–12.9)
POTASSIUM SERPL-SCNC: 3.9 MMOL/L (ref 3.5–5.1)
PROT SERPL-MCNC: 7.3 G/DL (ref 6.4–8.2)
RBC # BLD AUTO: 4.02 M/UL (ref 3.8–5.2)
SODIUM SERPL-SCNC: 140 MMOL/L (ref 136–145)
TROPONIN-HIGH SENSITIVITY: 4 NG/L (ref 0–51)
WBC # BLD AUTO: 11.9 K/UL (ref 3.6–11)

## 2022-09-16 PROCEDURE — 85025 COMPLETE CBC W/AUTO DIFF WBC: CPT

## 2022-09-16 PROCEDURE — 80053 COMPREHEN METABOLIC PANEL: CPT

## 2022-09-16 PROCEDURE — 84484 ASSAY OF TROPONIN QUANT: CPT

## 2022-09-16 PROCEDURE — 93005 ELECTROCARDIOGRAM TRACING: CPT

## 2022-09-16 PROCEDURE — 99285 EMERGENCY DEPT VISIT HI MDM: CPT

## 2022-09-16 PROCEDURE — 83880 ASSAY OF NATRIURETIC PEPTIDE: CPT

## 2022-09-16 PROCEDURE — 36415 COLL VENOUS BLD VENIPUNCTURE: CPT

## 2022-09-16 PROCEDURE — 71045 X-RAY EXAM CHEST 1 VIEW: CPT

## 2022-09-17 ENCOUNTER — HOSPITAL ENCOUNTER (EMERGENCY)
Age: 27
Discharge: HOME | End: 2022-09-17
Attending: STUDENT IN AN ORGANIZED HEALTH CARE EDUCATION/TRAINING PROGRAM
Payer: COMMERCIAL

## 2022-09-17 DIAGNOSIS — E86.0 DEHYDRATION: ICD-10-CM

## 2022-09-17 DIAGNOSIS — K21.9 GASTROESOPHAGEAL REFLUX DISEASE WITHOUT ESOPHAGITIS: Primary | ICD-10-CM

## 2022-09-17 PROCEDURE — 74011250637 HC RX REV CODE- 250/637: Performed by: STUDENT IN AN ORGANIZED HEALTH CARE EDUCATION/TRAINING PROGRAM

## 2022-09-17 PROCEDURE — 74011000250 HC RX REV CODE- 250: Performed by: STUDENT IN AN ORGANIZED HEALTH CARE EDUCATION/TRAINING PROGRAM

## 2022-09-17 RX ORDER — FAMOTIDINE 20 MG/1
20 TABLET, FILM COATED ORAL 2 TIMES DAILY
Qty: 20 TABLET | Refills: 0 | Status: SHIPPED | OUTPATIENT
Start: 2022-09-17 | End: 2022-09-27

## 2022-09-17 RX ADMIN — LIDOCAINE HYDROCHLORIDE 40 ML: 20 SOLUTION ORAL; TOPICAL at 02:27

## 2022-09-17 NOTE — ED NOTES
Patient discharged by Lucy Myers MD - pt sent to the front lobby, with strong and steady gait, no acute distress noted at time of discharge - Discharge information / home RX / and reasons to return to the ED were reviewed by the ED provider.

## 2022-09-17 NOTE — ED PROVIDER NOTES
EMERGENCY DEPARTMENT HISTORY AND PHYSICAL EXAM      Date: 9/17/2022  Patient Name: Tonny Slade    History of Presenting Illness     Chief Complaint   Patient presents with    Chest Pain     Pt states she has had acid reflux like feeling for 2 days that is not going away       History Provided By: Patient    HPI: Tonny Slade, 32 y.o. female presents to the ED with cc of burning epigastric, chest pain for the last 2 days. Patient states that her symptoms are worse with eating, improved several hours after eating. She states that she feels like she needs to burp but is unable to. She denies any shortness of breath. She states she is recently recovered from a viral illness in which she experienced cough and body aches. She denies any history of reflux but reports family history of reflux. She denies any fever, chills, current chest pain, shortness of breath, nausea, vomiting, diarrhea. She has not tried anything for the symptoms today. There are no other complaints, changes, or physical findings at this time. PCP: Martine Enciso MD    No current facility-administered medications on file prior to encounter. Current Outpatient Medications on File Prior to Encounter   Medication Sig Dispense Refill    [DISCONTINUED] famotidine (PEPCID) 20 mg tablet Take 1 Tablet by mouth two (2) times a day. 14 Tablet 0    ibuprofen (MOTRIN) 600 mg tablet Take 1 Tablet by mouth every six (6) hours as needed for Pain. 20 Tablet 0    acetaminophen (Tylenol Extra Strength) 500 mg tablet Take 2 Tablets by mouth every six (6) hours as needed for Pain. 20 Tablet 0    lidocaine (LIDODERM) 5 % Apply patch to the affected area for 12 hours a day and remove for 12 hours a day.  5 Each 0       Past History     Past Medical History:  Past Medical History:   Diagnosis Date    Asthma        Past Surgical History:  Past Surgical History:   Procedure Laterality Date    HX ACL RECONSTRUCTION Left 2012    HX ORTHOPAEDIC 2012    left ACL repaired       Family History:  Family History   Problem Relation Age of Onset    No Known Problems Mother     Cancer Father     No Known Problems Sister     No Known Problems Brother     Cancer Maternal Grandmother     Cancer Maternal Grandfather     Cancer Paternal Grandmother     Cancer Paternal Grandfather        Social History:  Social History     Tobacco Use    Smoking status: Never    Smokeless tobacco: Never   Substance Use Topics    Alcohol use: No    Drug use: No       Allergies: Allergies   Allergen Reactions    Latex Other (comments)     Skin irritation    Amoxicillin Itching    Pcn [Penicillins] Hives    Shellfish Derived Itching         Review of Systems   Review of Systems   Constitutional:  Negative for chills and fever. HENT:  Negative for rhinorrhea and sore throat. Respiratory:  Negative for cough and shortness of breath. Cardiovascular:  Positive for chest pain. Gastrointestinal:  Negative for abdominal pain, diarrhea, nausea and vomiting. Genitourinary:  Negative for dysuria and hematuria. Musculoskeletal:  Negative for arthralgias and myalgias. Neurological:  Negative for dizziness, seizures and weakness. All other systems reviewed and are negative. Physical Exam   Physical Exam  Vitals and nursing note reviewed. Constitutional:       Appearance: She is well-developed. HENT:      Head: Normocephalic and atraumatic. Cardiovascular:      Rate and Rhythm: Normal rate and regular rhythm. Heart sounds: Normal heart sounds. Heart sounds not distant. Pulmonary:      Effort: Pulmonary effort is normal.      Breath sounds: Normal breath sounds. Abdominal:      General: There is no distension. Palpations: Abdomen is soft. Musculoskeletal:         General: Normal range of motion. Cervical back: Neck supple. Skin:     General: Skin is warm and dry. Neurological:      Mental Status: She is alert.    Psychiatric:         Mood and Affect: Mood normal.         Behavior: Behavior normal.       Diagnostic Study Results     Labs -     Recent Results (from the past 12 hour(s))   CBC WITH AUTOMATED DIFF    Collection Time: 09/16/22 10:44 PM   Result Value Ref Range    WBC 11.9 (H) 3.6 - 11.0 K/uL    RBC 4.02 3.80 - 5.20 M/uL    HGB 12.2 11.5 - 16.0 g/dL    HCT 36.9 35.0 - 47.0 %    MCV 91.8 80.0 - 99.0 FL    MCH 30.3 26.0 - 34.0 PG    MCHC 33.1 30.0 - 36.5 g/dL    RDW 13.2 11.5 - 14.5 %    PLATELET 348 701 - 299 K/uL    MPV 9.9 8.9 - 12.9 FL    NRBC 0.0 0  WBC    ABSOLUTE NRBC 0.00 0.00 - 0.01 K/uL    NEUTROPHILS 60 32 - 75 %    LYMPHOCYTES 30 12 - 49 %    MONOCYTES 7 5 - 13 %    EOSINOPHILS 1 0 - 7 %    BASOPHILS 1 0 - 1 %    IMMATURE GRANULOCYTES 0 0.0 - 0.5 %    ABS. NEUTROPHILS 7.2 1.8 - 8.0 K/UL    ABS. LYMPHOCYTES 3.6 (H) 0.8 - 3.5 K/UL    ABS. MONOCYTES 0.9 0.0 - 1.0 K/UL    ABS. EOSINOPHILS 0.1 0.0 - 0.4 K/UL    ABS. BASOPHILS 0.1 0.0 - 0.1 K/UL    ABS. IMM. GRANS. 0.0 0.00 - 0.04 K/UL    DF AUTOMATED     METABOLIC PANEL, COMPREHENSIVE    Collection Time: 09/16/22 10:44 PM   Result Value Ref Range    Sodium 140 136 - 145 mmol/L    Potassium 3.9 3.5 - 5.1 mmol/L    Chloride 109 (H) 97 - 108 mmol/L    CO2 28 21 - 32 mmol/L    Anion gap 3 (L) 5 - 15 mmol/L    Glucose 94 65 - 100 mg/dL    BUN 13 6 - 20 MG/DL    Creatinine 1.16 (H) 0.55 - 1.02 MG/DL    BUN/Creatinine ratio 11 (L) 12 - 20      GFR est AA >60 >60 ml/min/1.73m2    GFR est non-AA 55 (L) >60 ml/min/1.73m2    Calcium 9.0 8.5 - 10.1 MG/DL    Bilirubin, total 0.3 0.2 - 1.0 MG/DL    ALT (SGPT) 17 12 - 78 U/L    AST (SGOT) 16 15 - 37 U/L    Alk.  phosphatase 75 45 - 117 U/L    Protein, total 7.3 6.4 - 8.2 g/dL    Albumin 4.0 3.5 - 5.0 g/dL    Globulin 3.3 2.0 - 4.0 g/dL    A-G Ratio 1.2 1.1 - 2.2     NT-PRO BNP    Collection Time: 09/16/22 10:44 PM   Result Value Ref Range    NT pro-BNP 29 <125 PG/ML   TROPONIN-HIGH SENSITIVITY    Collection Time: 09/16/22 10:44 PM   Result Value Ref Range Troponin-High Sensitivity 4 0 - 51 ng/L   EKG, 12 LEAD, INITIAL    Collection Time: 09/16/22 10:47 PM   Result Value Ref Range    Ventricular Rate 80 BPM    Atrial Rate 80 BPM    P-R Interval 164 ms    QRS Duration 74 ms    Q-T Interval 384 ms    QTC Calculation (Bezet) 442 ms    Calculated P Axis 79 degrees    Calculated R Axis 91 degrees    Calculated T Axis 79 degrees    Diagnosis       Normal sinus rhythm  Rightward axis  When compared with ECG of 15-OCT-2021 09:56,  Nonspecific T wave abnormality, improved in Anterolateral leads         Radiologic Studies -   XR CHEST PORT   Final Result   No acute infiltrate. CT Results  (Last 48 hours)      None          CXR Results  (Last 48 hours)                 09/16/22 2253  XR CHEST PORT Final result    Impression:  No acute infiltrate. Narrative:  Clinical indication: Chest pain. Frontal view of the chest obtained, comparison October 15, 2021. The a heart   size is normal. No acute infiltrate. Medical Decision Making   I am the first provider for this patient. I reviewed the vital signs, available nursing notes, past medical history, past surgical history, family history and social history. Vital Signs-Reviewed the patient's vital signs. Patient Vitals for the past 12 hrs:   Temp Pulse Resp BP SpO2   09/16/22 2239 98 °F (36.7 °C) 63 16 117/68 100 %       EKG interpretation: (Preliminary)  Normal sinus rhythm, right axis, there is normal, AL interval normal, no significant ST elevations, depressions or T wave inversions. Records Reviewed: Nursing Notes, Previous Radiology Studies, and Previous Laboratory Studies    Provider Notes (Medical Decision Making):   Patient presents with CP. DDx:  ACS, Aortic dissection, PNA, PE, PTX, pericarditis, myocarditis, GERD, costochondritis, anxiety. Described as a burning sensation, located primarily in the epigastric region, radiating up to the upper chest.  Worse with eating. Symptoms suspicious for GERD. Troponin negative, chest x-ray negative, lab work otherwise unremarkable, mild leukocytosis and mild elevation in creatinine, patient endorses less fluid intake recently and recent viral illness. With GI cocktail, prescribe Pepcid. Patient did have previous prescription for Pepcid but states she is no longer taking it. Discussed return precautions, reasons to return with patient understood. ED Course:   Initial assessment performed. The patients presenting problems have been discussed, and they are in agreement with the care plan formulated and outlined with them. I have encouraged them to ask questions as they arise throughout their visit. Disposition:    DC- Adult Discharges: All of the diagnostic tests were reviewed and questions answered. Diagnosis, care plan and treatment options were discussed. The patient understands the instructions and will follow up as directed. The patients results have been reviewed with them. They have been counseled regarding their diagnosis. The patient verbally convey understanding and agreement of the signs, symptoms, diagnosis, treatment and prognosis and additionally agrees to follow up as recommended with their PCP in 24 - 48 hours. They also agree with the care-plan and convey that all of their questions have been answered. I have also put together some discharge instructions for them that include: 1) educational information regarding their diagnosis, 2) how to care for their diagnosis at home, as well a 3) list of reasons why they would want to return to the ED prior to their follow-up appointment, should their condition change. DC-The patient was given verbal follow-up instructions    DISCHARGE PLAN:  1. Discharge Medication List as of 9/17/2022  2:22 AM        START taking these medications    Details   famotidine (Pepcid) 20 mg tablet Take 1 Tablet by mouth two (2) times a day for 10 days. , Normal, Disp-20 Tablet, R-0 CONTINUE these medications which have NOT CHANGED    Details   ibuprofen (MOTRIN) 600 mg tablet Take 1 Tablet by mouth every six (6) hours as needed for Pain., Normal, Disp-20 Tablet, R-0      acetaminophen (Tylenol Extra Strength) 500 mg tablet Take 2 Tablets by mouth every six (6) hours as needed for Pain., Normal, Disp-20 Tablet, R-0      lidocaine (LIDODERM) 5 % Apply patch to the affected area for 12 hours a day and remove for 12 hours a day., Normal, Disp-5 Each, R-0           2. Follow-up Information       Follow up With Specialties Details Why Contact Info    daniel Wall MD Family Medicine Schedule an appointment as soon as possible for a visit  As needed [phone]            3. Return to ED if worse     Diagnosis     Clinical Impression:   1. Gastroesophageal reflux disease without esophagitis    2. Dehydration        Attestations:    Frieda Ward, DO        Please note that this dictation was completed with [employer], the computer voice recognition software. Quite often unanticipated grammatical, syntax, homophones, and other interpretive errors are inadvertently transcribed by the computer software. Please disregard these errors. Please excuse any errors that have escaped final proofreading. Thank you.

## 2022-09-17 NOTE — DISCHARGE INSTRUCTIONS
Please return to the ER if your symptoms change or worsen. Take the prescribed pepcid (you were also prescribed this last month at your other ED visit), only take 1 of these prescriptions.

## 2022-09-18 LAB
ATRIAL RATE: 80 BPM
CALCULATED P AXIS, ECG09: 79 DEGREES
CALCULATED R AXIS, ECG10: 91 DEGREES
CALCULATED T AXIS, ECG11: 79 DEGREES
DIAGNOSIS, 93000: NORMAL
P-R INTERVAL, ECG05: 164 MS
Q-T INTERVAL, ECG07: 384 MS
QRS DURATION, ECG06: 74 MS
QTC CALCULATION (BEZET), ECG08: 442 MS
VENTRICULAR RATE, ECG03: 80 BPM

## 2023-05-06 NOTE — LETTER
[address] 
[facility] EMERGENCY DEPT 
[address] [address] 
[phone] Work/School Note Date: 1/7/2019 To Whom It May concern: 
 
Aisha Duckworth was seen and treated today in the emergency room by the following provider(s): 
Attending Provider: Karma Amezquita MD.   
 
Aisha Duckworth may return to work on 1/9/19.  
 
Sincerely, 
 
 
 
 
Vimal Stephen RN 
 
 
 

[address] 
[facility] EMERGENCY DEPT 
[address] [place] [address] [address] [phone] 
[phone] Work/School Note Date: 1/7/2019 To Whom It May concern: 
 
Vicente Kyle was seen and treated today in the emergency room by the following provider(s): 
Attending Provider: Margarita Kendrick MD.   
 
Vicente Kyle may return to work on 1/10/19. Sincerely, Nicola Arciniega 
 
 
 

[facility]

## 2024-01-31 ENCOUNTER — HOSPITAL ENCOUNTER (EMERGENCY)
Facility: HOSPITAL | Age: 29
Discharge: HOME | End: 2024-01-31
Payer: COMMERCIAL

## 2024-01-31 VITALS
SYSTOLIC BLOOD PRESSURE: 121 MMHG | RESPIRATION RATE: 16 BRPM | OXYGEN SATURATION: 100 % | TEMPERATURE: 98.5 F | DIASTOLIC BLOOD PRESSURE: 62 MMHG | HEIGHT: 68 IN | WEIGHT: 135 LBS | HEART RATE: 77 BPM | BODY MASS INDEX: 20.46 KG/M2

## 2024-01-31 DIAGNOSIS — N30.00 ACUTE CYSTITIS WITHOUT HEMATURIA: Primary | ICD-10-CM

## 2024-01-31 DIAGNOSIS — R10.32 ABDOMINAL PAIN, LEFT LOWER QUADRANT: ICD-10-CM

## 2024-01-31 LAB
APPEARANCE UR: ABNORMAL
BACTERIA URNS QL MICRO: NEGATIVE /HPF
BILIRUB UR QL: NEGATIVE
COLOR UR: ABNORMAL
EPITH CASTS URNS QL MICRO: ABNORMAL /LPF
GLUCOSE UR STRIP.AUTO-MCNC: NEGATIVE MG/DL
HCG UR QL: NEGATIVE
HGB UR QL STRIP: NEGATIVE
KETONES UR QL STRIP.AUTO: NEGATIVE MG/DL
LEUKOCYTE ESTERASE UR QL STRIP.AUTO: ABNORMAL
NITRITE UR QL STRIP.AUTO: NEGATIVE
PH UR STRIP: 6.5 (ref 5–8)
PROT UR STRIP-MCNC: NEGATIVE MG/DL
RBC #/AREA URNS HPF: ABNORMAL /HPF (ref 0–5)
SP GR UR REFRACTOMETRY: 1.02
URINE CULTURE IF INDICATED: ABNORMAL
UROBILINOGEN UR QL STRIP.AUTO: 1 EU/DL (ref 0.2–1)
WBC URNS QL MICRO: ABNORMAL /HPF (ref 0–4)

## 2024-01-31 PROCEDURE — 87086 URINE CULTURE/COLONY COUNT: CPT

## 2024-01-31 PROCEDURE — 81001 URINALYSIS AUTO W/SCOPE: CPT

## 2024-01-31 PROCEDURE — 99283 EMERGENCY DEPT VISIT LOW MDM: CPT

## 2024-01-31 PROCEDURE — 81025 URINE PREGNANCY TEST: CPT

## 2024-01-31 RX ORDER — NITROFURANTOIN 25; 75 MG/1; MG/1
100 CAPSULE ORAL 2 TIMES DAILY
Qty: 10 CAPSULE | Refills: 0 | Status: SHIPPED | OUTPATIENT
Start: 2024-01-31 | End: 2024-02-05

## 2024-01-31 RX ORDER — IBUPROFEN 600 MG/1
600 TABLET ORAL EVERY 6 HOURS PRN
Qty: 20 TABLET | Refills: 0 | Status: SHIPPED | OUTPATIENT
Start: 2024-01-31

## 2024-01-31 ASSESSMENT — PAIN DESCRIPTION - LOCATION: LOCATION: ABDOMEN

## 2024-01-31 ASSESSMENT — LIFESTYLE VARIABLES
HOW OFTEN DO YOU HAVE A DRINK CONTAINING ALCOHOL: NEVER
HOW MANY STANDARD DRINKS CONTAINING ALCOHOL DO YOU HAVE ON A TYPICAL DAY: PATIENT DOES NOT DRINK

## 2024-01-31 ASSESSMENT — PAIN DESCRIPTION - DESCRIPTORS: DESCRIPTORS: CRAMPING

## 2024-01-31 ASSESSMENT — PAIN DESCRIPTION - ORIENTATION: ORIENTATION: LOWER

## 2024-01-31 ASSESSMENT — PAIN - FUNCTIONAL ASSESSMENT: PAIN_FUNCTIONAL_ASSESSMENT: 0-10

## 2024-01-31 ASSESSMENT — PAIN SCALES - GENERAL: PAINLEVEL_OUTOF10: 8

## 2024-02-01 NOTE — ED TRIAGE NOTES
Abdominal cramping x 2 days. Notes finishing her menstrual cycle on 1/20/24. No vaginal bleeding, abnormal discharge or urinary symptoms. Notes she is able to have bowel movements. Last one was yesterday and normal. Denies N/V/D

## 2024-02-01 NOTE — ED PROVIDER NOTES
[facility] EMERGENCY DEPT  EMERGENCY DEPARTMENT ENCOUNTER         Pt Name: Teresa Donis  MRN: [id number]  Birthdate 1995  Date of evaluation: 1/31/2024  Provider: Kobe Guillermo PA-C   PCP: Kalyani Ray MD  Note Started: 9:00 PM EST 1/31/24     CHIEF COMPLAINT       Chief Complaint   Patient presents with    Abdominal Pain        HISTORY OF PRESENT ILLNESS: 1 or more elements      History From: Patient  HPI Limitations: None     Teresa Donis is a 28 y.o. female who presents left lower abdominal pain x 2 days.  Patient denies any nausea, vomiting, diarrhea, dysuria or urinary frequency.  She does report normal bowel movements and states the pain is actually relieved after having a bowel movement however returned intermittently.  She denies any fevers or chills.  She rates discomfort 8 out of 10.  LMP 2 weeks ago.     Nursing Notes were all reviewed and agreed with or any disagreements were addressed in the HPI.     REVIEW OF SYSTEMS      Review of Systems     Positives and Pertinent negatives as per HPI.    PAST HISTORY     Past Medical History:  Past Medical History:   Diagnosis Date    Asthma        Past Surgical History:  Past Surgical History:   Procedure Laterality Date    ANTERIOR CRUCIATE LIGAMENT REPAIR Left 2012    ORTHOPEDIC SURGERY  2012    left ACL repaired       Family History:  Family History   Problem Relation Age of Onset    Cancer Father     No Known Problems Mother     No Known Problems Sister     No Known Problems Brother     Cancer Maternal Grandmother     Cancer Maternal Grandfather     Cancer Paternal Grandmother     Cancer Paternal Grandfather        Social History:  Social History     Tobacco Use    Smoking status: Never    Smokeless tobacco: Never   Substance Use Topics    Alcohol use: No    Drug use: No       Allergies:  Allergies   Allergen Reactions    Latex Other (See Comments)     Skin irritation    Penicillins Hives and Itching    Shellfish Allergy Itching

## 2024-02-01 NOTE — ED NOTES
Pt presents ambulatory to ED complaining of LLQ pain and cramping x 2 days Pt denies n/v/d. Pt denies urinary symptoms, denies chance of pregnancy, denies vaginal bleeding. Pt reports normal BM.     Patient is resting on stretcher. Call bell at bedside. Patient is A&Ox4, follows commands. Patient RR is regular rate and depth and vitals remain WNL. Pt has tenderness noted to the LLQ, no masses or bulges felt on palpation.    Patient is well appearing and does not appear to be in acute distress at this time. Patient and family updated on care plan. This RN will continue to monitor.     Emergency Department Nursing Plan of Care The Nursing Plan of Care is developed from the Nursing assessment and Emergency Department Attending provider initial evaluation. The plan of care may be reviewed in the “ED Provider note”.     The Plan of Care was developed with the following considerations:  Patient / Family readiness to learn indicated by:verbalized understanding, successful return demonstration, and appropriate questions asked  Persons(s) to be included in education: patient  Barriers to Learning/Limitations:None    Signed    Alber Murrell RN   1/31/2024   8:11 PM

## 2024-02-01 NOTE — ED NOTES
Patient given copy of dc instructions and 0 paper script(s) and 2 electronic scripts.  Patient verbalized understanding of instructions and script (s).  Patient given a current medication reconciliation form and verbalized understanding of their medications.   Patient  verbalized understanding of the importance of discussing medications with  his or her physician or clinic they will be following up with.  Patient alert and oriented and in no acute distress.

## 2024-02-02 LAB
BACTERIA SPEC CULT: NORMAL
SERVICE CMNT-IMP: NORMAL

## 2024-10-29 ENCOUNTER — HOSPITAL ENCOUNTER (EMERGENCY)
Facility: HOSPITAL | Age: 29
Discharge: HOME | End: 2024-10-29
Attending: EMERGENCY MEDICINE
Payer: COMMERCIAL

## 2024-10-29 VITALS
TEMPERATURE: 97.3 F | HEART RATE: 92 BPM | SYSTOLIC BLOOD PRESSURE: 106 MMHG | DIASTOLIC BLOOD PRESSURE: 70 MMHG | OXYGEN SATURATION: 100 % | RESPIRATION RATE: 20 BRPM

## 2024-10-29 DIAGNOSIS — J06.9 VIRAL UPPER RESPIRATORY ILLNESS: Primary | ICD-10-CM

## 2024-10-29 DIAGNOSIS — E86.0 DEHYDRATION: ICD-10-CM

## 2024-10-29 LAB
FLUAV RNA SPEC QL NAA+PROBE: NOT DETECTED
FLUBV RNA SPEC QL NAA+PROBE: NOT DETECTED
SARS-COV-2 RNA RESP QL NAA+PROBE: NOT DETECTED
SOURCE: NORMAL

## 2024-10-29 PROCEDURE — 99284 EMERGENCY DEPT VISIT MOD MDM: CPT

## 2024-10-29 PROCEDURE — 93005 ELECTROCARDIOGRAM TRACING: CPT | Performed by: EMERGENCY MEDICINE

## 2024-10-29 PROCEDURE — 36415 COLL VENOUS BLD VENIPUNCTURE: CPT

## 2024-10-29 PROCEDURE — 87636 SARSCOV2 & INF A&B AMP PRB: CPT

## 2024-10-29 ASSESSMENT — ENCOUNTER SYMPTOMS
NAUSEA: 0
SHORTNESS OF BREATH: 0
COUGH: 1
DIARRHEA: 0
VOMITING: 0
SORE THROAT: 1

## 2024-10-29 NOTE — ED NOTES
Patient stable GCS15  Vitally stable not in any form of distress  Covid test done   Discharged summary given and understood

## 2024-10-29 NOTE — ED PROVIDER NOTES
appointment next week.  Given her overall good vital signs and well appearance obtain COVID and flu testing, EKG, and orthostatic vital signs.        ED Course as of 10/29/24 1340   Tue Oct 29, 2024   1316 Flu/covid swab negative [SE]   1316 Pt borderline orthostatic by pulse only, will encourage PO hydration and plan to discharge [SE]      ED Course User Index  [SE] Kalyani Hernandez MD       Disposition Considerations (Tests not done, Shared Decision Making, Pt Expectation of Test or Tx.):      FINAL IMPRESSION     1. Viral upper respiratory illness    2. Dehydration          DISPOSITION/PLAN   DISPOSITION Decision To Discharge 10/29/2024 01:23:27 PM           Discharge Note: The patient is stable for discharge home. The signs, symptoms, diagnosis, and discharge instructions have been discussed, understanding conveyed, and agreed upon. The patient is to follow up as recommended or return to ER should their symptoms worsen.      PATIENT REFERRED TO:  No follow-up provider specified.       DISCHARGE MEDICATIONS:     Medication List        ASK your doctor about these medications      acetaminophen 500 MG tablet  Commonly known as: TYLENOL     ibuprofen 600 MG tablet  Commonly known as: ADVIL;MOTRIN  Take 1 tablet by mouth every 6 hours as needed for Pain     lidocaine 5 %  Commonly known as: LIDODERM                DISCONTINUED MEDICATIONS:  Current Discharge Medication List          I am the Primary Clinician of Record.   Kalyani Hernandez MD (electronically signed)  Case discussed with GUNNAR WARD       (Please note that parts of this dictation were completed with voice recognition software. Quite often unanticipated grammatical, syntax, homophones, and other interpretive errors are inadvertently transcribed by the computer software. Please disregards these errors. Please excuse any errors that have escaped final proofreading.)

## 2024-10-29 NOTE — DISCHARGE INSTRUCTIONS
You were seen at the Emergency Department today for light headedness.    Based on your exam and work up including:  EKG  COVID/flu swab    We will be discharging you home.    Follow up with your primary care doctor as scheduled.    Return to the ER for:  Worsening light headedness  Passing out  Chest pain  Trouble breathing  Leg swelling  Sudden changes in vision or headaches    Or if you are otherwise concerned.    Thank you for allowing us to participate in your care today.

## 2024-10-30 LAB
EKG ATRIAL RATE: 92 BPM
EKG DIAGNOSIS: NORMAL
EKG P AXIS: 57 DEGREES
EKG P-R INTERVAL: 146 MS
EKG Q-T INTERVAL: 350 MS
EKG QRS DURATION: 74 MS
EKG QTC CALCULATION (BAZETT): 432 MS
EKG R AXIS: 59 DEGREES
EKG T AXIS: 33 DEGREES
EKG VENTRICULAR RATE: 92 BPM

## 2024-12-07 ENCOUNTER — HOSPITAL ENCOUNTER (OUTPATIENT)
Facility: HOSPITAL | Age: 29
Setting detail: OBSERVATION
Discharge: HOME | End: 2024-12-08
Attending: STUDENT IN AN ORGANIZED HEALTH CARE EDUCATION/TRAINING PROGRAM | Admitting: OBSTETRICS & GYNECOLOGY
Payer: COMMERCIAL

## 2024-12-07 VITALS
BODY MASS INDEX: 24.24 KG/M2 | SYSTOLIC BLOOD PRESSURE: 107 MMHG | WEIGHT: 159.39 LBS | OXYGEN SATURATION: 99 % | DIASTOLIC BLOOD PRESSURE: 67 MMHG | TEMPERATURE: 98.2 F | RESPIRATION RATE: 18 BRPM | HEART RATE: 92 BPM

## 2024-12-07 DIAGNOSIS — O26.893 ABDOMINAL PAIN DURING PREGNANCY IN THIRD TRIMESTER: Primary | ICD-10-CM

## 2024-12-07 DIAGNOSIS — R10.9 ABDOMINAL PAIN DURING PREGNANCY IN THIRD TRIMESTER: Primary | ICD-10-CM

## 2024-12-07 PROBLEM — Z3A.32 32 WEEKS GESTATION OF PREGNANCY: Status: ACTIVE | Noted: 2024-12-07

## 2024-12-07 PROBLEM — O23.43 URINARY TRACT INFECTION IN MOTHER DURING THIRD TRIMESTER OF PREGNANCY: Status: ACTIVE | Noted: 2024-12-07

## 2024-12-07 PROBLEM — O47.00 PRETERM CONTRACTIONS: Status: ACTIVE | Noted: 2024-12-07

## 2024-12-07 LAB
APPEARANCE UR: ABNORMAL
BACTERIA URNS QL MICRO: ABNORMAL /HPF
BILIRUB UR QL: NEGATIVE
COLOR UR: ABNORMAL
EPITH CASTS URNS QL MICRO: ABNORMAL /LPF
GLUCOSE UR STRIP.AUTO-MCNC: NEGATIVE MG/DL
HGB UR QL STRIP: NEGATIVE
KETONES UR QL STRIP.AUTO: 15 MG/DL
LEUKOCYTE ESTERASE UR QL STRIP.AUTO: ABNORMAL
NITRITE UR QL STRIP.AUTO: NEGATIVE
PH UR STRIP: 6 (ref 5–8)
PROT UR STRIP-MCNC: 30 MG/DL
RBC #/AREA URNS HPF: ABNORMAL /HPF (ref 0–5)
SP GR UR REFRACTOMETRY: 1.02
URINE CULTURE IF INDICATED: ABNORMAL
UROBILINOGEN UR QL STRIP.AUTO: 1 EU/DL (ref 0.2–1)
WBC URNS QL MICRO: ABNORMAL /HPF (ref 0–4)

## 2024-12-07 PROCEDURE — 2580000003 HC RX 258: Performed by: OBSTETRICS & GYNECOLOGY

## 2024-12-07 PROCEDURE — G0378 HOSPITAL OBSERVATION PER HR: HCPCS

## 2024-12-07 PROCEDURE — 4500000002 HC ER NO CHARGE

## 2024-12-07 PROCEDURE — 87086 URINE CULTURE/COLONY COUNT: CPT

## 2024-12-07 PROCEDURE — 81001 URINALYSIS AUTO W/SCOPE: CPT

## 2024-12-07 PROCEDURE — 6370000000 HC RX 637 (ALT 250 FOR IP): Performed by: OBSTETRICS & GYNECOLOGY

## 2024-12-07 PROCEDURE — 96360 HYDRATION IV INFUSION INIT: CPT

## 2024-12-07 RX ORDER — SULFAMETHOXAZOLE AND TRIMETHOPRIM 800; 160 MG/1; MG/1
1 TABLET ORAL EVERY 12 HOURS SCHEDULED
Qty: 14 TABLET | Refills: 0 | Status: SHIPPED | OUTPATIENT
Start: 2024-12-07 | End: 2024-12-14

## 2024-12-07 RX ORDER — FERROUS SULFATE 137(45) MG
45 TABLET, EXTENDED RELEASE ORAL
COMMUNITY
Start: 2024-11-14

## 2024-12-07 RX ORDER — NIFEDIPINE 10 MG/1
10 CAPSULE ORAL
Status: COMPLETED | OUTPATIENT
Start: 2024-12-07 | End: 2024-12-07

## 2024-12-07 RX ORDER — SODIUM CHLORIDE, SODIUM LACTATE, POTASSIUM CHLORIDE, AND CALCIUM CHLORIDE .6; .31; .03; .02 G/100ML; G/100ML; G/100ML; G/100ML
1000 INJECTION, SOLUTION INTRAVENOUS ONCE
Status: COMPLETED | OUTPATIENT
Start: 2024-12-07 | End: 2024-12-07

## 2024-12-07 RX ORDER — SULFAMETHOXAZOLE AND TRIMETHOPRIM 800; 160 MG/1; MG/1
1 TABLET ORAL EVERY 12 HOURS SCHEDULED
Status: DISCONTINUED | OUTPATIENT
Start: 2024-12-07 | End: 2024-12-08 | Stop reason: HOSPADM

## 2024-12-07 RX ADMIN — SULFAMETHOXAZOLE AND TRIMETHOPRIM 1 TABLET: 800; 160 TABLET ORAL at 23:21

## 2024-12-07 RX ADMIN — NIFEDIPINE 10 MG: 10 CAPSULE ORAL at 23:20

## 2024-12-07 RX ADMIN — SODIUM CHLORIDE, POTASSIUM CHLORIDE, SODIUM LACTATE AND CALCIUM CHLORIDE 1000 ML: 600; 310; 30; 20 INJECTION, SOLUTION INTRAVENOUS at 22:16

## 2024-12-08 PROCEDURE — 99204 OFFICE O/P NEW MOD 45 MIN: CPT

## 2024-12-08 NOTE — PROGRESS NOTES
Dr. Heck ordered pt can be discharged home with follow up with [facility] midwives on Monday 12/09. Discharge instructions given to pt, pt verbalized understanding, all questions and concerns addressed at this time.  
Ob called regarding lab results of urinalysis. Dr. Heck ordered bactrim and procardia at this time. BP discussed with md, will continue with procardia.  
Teresa Donis is a 29 y.o.  at 32w0d patient of  [facility]  who presents to L&D triage with c/o \"tightening\" in the abdomen that started yesterday while sitting at work. She reports Positive FM, denies vaginal bleeding and LOF. She also denies Headaches, Scotoma, RUQ pain, and Edema. Urine sample obtained. EFM and toco placed for initial assessment.   
Intact

## 2024-12-08 NOTE — H&P
Obstetrics History & Physical    Name: Teresa Donis MRN: [id number]  SSN: [id number]    YOB: 1995  Age: 29 y.o.  Sex: female      Subjective:     Reason for Admission:  Pregnancy with abdominal cramps    History of Present Illness: Teresa Donis is 29 y.o. year old Black /   female who presents for with an estimated gestational age of 32w0d with Estimated Date of Delivery: 25. Patient complains of abdominal cramping on & off since yesterday.  Pregnancy has been complicated by anemia -- treated with iron  Sees [facility].  Plans delivery at [facility] with midwives.  Patient denies LOF, VB, Vaginal discharge. Reports active fetus.      Problem List:  Patient Active Problem List    Diagnosis Date Noted     contractions 2024    32 weeks gestation of pregnancy 2024    Urinary tract infection in mother during third trimester of pregnancy 2024     Past Medical History:   Diagnosis Date    Asthma      Past Surgical History:   Procedure Laterality Date    ANTERIOR CRUCIATE LIGAMENT REPAIR Left        OB History    Para Term  AB Living   2 1 1 0 0 1   SAB IAB Ectopic Molar Multiple Live Births   0 0 0 0 0 1      # Outcome Date GA Lbr Saurabh/2nd Weight Sex Type Anes PTL Lv   2 Current            1 Term 19 41w0d  3.345 kg (7 lb 6 oz) M Vag-Spont   [facility]     Social History     Socioeconomic History    Marital status: Single   Tobacco Use    Smoking status: Never    Smokeless tobacco: Never   Substance and Sexual Activity    Alcohol use: No    Drug use: No    Sexual activity: Yes     Partners: Male     Birth control/protection: None     Social Determinants of Health     Financial Resource Strain: Low Risk  (2019)    Received from [facility]  (prior to 2023), [facility]  (prior to 2023)    Overall Financial Resource Strain (CARDIA)     Difficulty of Paying Living Expenses: Not hard at

## 2024-12-08 NOTE — ED PROVIDER NOTES
Brief Medical Screening Examination for OB patient at triage    HPI: 30yo  female 8 months pregnant complains of 24 hours of diffuse abdominal pain.  This pregnancy complicated by possible placental leak.  She has had decreased fetal movement over the past 24 hours subjectively but baby moving currently.  She denies any vaginal bleeding or gush of fluid.  She follows with midwife at [facility].    ROS: Per HPI    Vitals: Patient Vitals for the past 4 hrs:   Temp Pulse BP SpO2   24 2057 98.1 °F (36.7 °C) 92 126/84 100 %         Physical Exam:  General appearance: No apparent distress.  Stable vitals.  Afebrile.  Skin exam: Warm and dry.  No pallor.  Neurologic: Alert and oriented.  Abdominal exam: Soft, minimal diffuse tenderness.  Gravid uterus.    Differential diagnosis: Labor, premature labor, threatened miscarriage, [place] contractions, uterine contractions in pregnancy, premature rupture of membranes, placental abruption    This patient with a primary obstetrical chief complaint is stable and medically cleared for direct transfer to the OB Labor & Delivery unit for further monitoring and workup.  Medical screening exam is complete.    MD Price Sierra William Burke, MD  24       Chance Thrasher MD  24

## 2024-12-09 LAB
BACTERIA SPEC CULT: NORMAL
SERVICE CMNT-IMP: NORMAL

## 2025-04-05 ENCOUNTER — APPOINTMENT (OUTPATIENT)
Facility: HOSPITAL | Age: 30
End: 2025-04-05
Payer: COMMERCIAL

## 2025-04-05 ENCOUNTER — HOSPITAL ENCOUNTER (EMERGENCY)
Facility: HOSPITAL | Age: 30
Discharge: HOME | End: 2025-04-05
Payer: COMMERCIAL

## 2025-04-05 VITALS
OXYGEN SATURATION: 100 % | TEMPERATURE: 98.8 F | SYSTOLIC BLOOD PRESSURE: 111 MMHG | HEART RATE: 92 BPM | RESPIRATION RATE: 16 BRPM | DIASTOLIC BLOOD PRESSURE: 75 MMHG | BODY MASS INDEX: 19.77 KG/M2 | WEIGHT: 130 LBS

## 2025-04-05 DIAGNOSIS — G89.18 POSTOPERATIVE PAIN: ICD-10-CM

## 2025-04-05 DIAGNOSIS — M25.572 ACUTE LEFT ANKLE PAIN: Primary | ICD-10-CM

## 2025-04-05 PROCEDURE — 99283 EMERGENCY DEPT VISIT LOW MDM: CPT

## 2025-04-05 PROCEDURE — 73610 X-RAY EXAM OF ANKLE: CPT

## 2025-04-05 PROCEDURE — 6370000000 HC RX 637 (ALT 250 FOR IP): Performed by: PHYSICIAN ASSISTANT

## 2025-04-05 RX ORDER — IBUPROFEN 400 MG/1
800 TABLET, FILM COATED ORAL
Status: COMPLETED | OUTPATIENT
Start: 2025-04-05 | End: 2025-04-05

## 2025-04-05 RX ORDER — LIDOCAINE 4 G/G
1 PATCH TOPICAL
Status: DISCONTINUED | OUTPATIENT
Start: 2025-04-05 | End: 2025-04-05 | Stop reason: HOSPADM

## 2025-04-05 RX ORDER — LIDOCAINE 50 MG/G
1 PATCH TOPICAL DAILY
Qty: 10 PATCH | Refills: 0 | Status: SHIPPED | OUTPATIENT
Start: 2025-04-05 | End: 2025-04-15

## 2025-04-05 RX ORDER — ACETAMINOPHEN 500 MG
1000 TABLET ORAL EVERY 6 HOURS PRN
Qty: 20 TABLET | Refills: 0 | Status: SHIPPED | OUTPATIENT
Start: 2025-04-05

## 2025-04-05 RX ORDER — IBUPROFEN 800 MG/1
800 TABLET, FILM COATED ORAL EVERY 8 HOURS PRN
Qty: 20 TABLET | Refills: 0 | Status: SHIPPED | OUTPATIENT
Start: 2025-04-05

## 2025-04-05 RX ADMIN — IBUPROFEN 800 MG: 400 TABLET, FILM COATED ORAL at 17:59

## 2025-04-05 ASSESSMENT — ENCOUNTER SYMPTOMS
WHEEZING: 0
VOMITING: 0
NAUSEA: 0
DIARRHEA: 0
RHINORRHEA: 0
COUGH: 0
SORE THROAT: 0
ABDOMINAL PAIN: 0
CHEST TIGHTNESS: 0
BACK PAIN: 0
EYE PAIN: 0
SHORTNESS OF BREATH: 0

## 2025-04-05 ASSESSMENT — PAIN DESCRIPTION - PAIN TYPE: TYPE: ACUTE PAIN

## 2025-04-05 ASSESSMENT — PAIN SCALES - GENERAL: PAINLEVEL_OUTOF10: 7

## 2025-04-05 ASSESSMENT — PAIN DESCRIPTION - LOCATION: LOCATION: ANKLE

## 2025-04-05 ASSESSMENT — PAIN - FUNCTIONAL ASSESSMENT: PAIN_FUNCTIONAL_ASSESSMENT: 0-10

## 2025-04-05 ASSESSMENT — PAIN DESCRIPTION - ORIENTATION: ORIENTATION: LEFT

## 2025-04-05 ASSESSMENT — PAIN DESCRIPTION - DESCRIPTORS: DESCRIPTORS: ACHING

## 2025-04-05 NOTE — ED PROVIDER NOTES
[facility] EMERGENCY DEPARTMENT  EMERGENCY DEPARTMENT ENCOUNTER         Pt Name: Teresa Donis  MRN: [id number]  Birthdate 1995  Date of evaluation: 4/5/2025  Provider: Seble Richardson PA-C   PCP: Toshia Pepe PA-C  Note Started: 6:24 PM EDT on 4/5/25     CHIEF COMPLAINT       Chief Complaint   Patient presents with    Ankle Pain     L ankle pain x 4 days NKI.  Pt had surgery in 2023 on left ankle.          HISTORY OF PRESENT ILLNESS: 1 or more elements      History Source: Patient  Arrival Mode: self  None     Teresa Donis is a 29 y.o. female with medical history significant for asthma as well as left ankle and knee surgery in 2012 who presents via self with complaints of acute moderate aching left ankle pain for 4 days.  No injury or trauma.  History of pain related to her surgery in the past, but it is never lasted 4 days.  Pertinently, patient is 2 months postpartum.  Denies any fever, chills, nausea, vomiting, worsening swelling, redness, rash, wound, numbness, tingling, focal weakness.  No medications prior to arrival today.     Nursing Notes were all reviewed and agreed with or any disagreements were addressed in the HPI.     REVIEW OF SYSTEMS      Review of Systems   Constitutional:  Negative for activity change, appetite change, chills, diaphoresis, fatigue, fever and unexpected weight change.   HENT:  Negative for congestion, rhinorrhea and sore throat.    Eyes:  Negative for pain and visual disturbance.   Respiratory:  Negative for cough, chest tightness, shortness of breath and wheezing.    Cardiovascular:  Negative for chest pain and palpitations.   Gastrointestinal:  Negative for abdominal pain, diarrhea, nausea and vomiting.   Musculoskeletal:  Positive for arthralgias. Negative for back pain, gait problem, joint swelling, myalgias, neck pain and neck stiffness.   Skin:  Negative for rash and wound.   Neurological:  Negative for dizziness, seizures, syncope, weakness,  to follow up as discussed.  She also agrees with the care-plan and conveys that all of her questions have been answered.  I have also provided discharge instructions for her that include: educational information regarding their diagnosis and treatment, and list of reasons why they would want to return to the ED prior to their follow-up appointment, should her condition change.     DISPOSITION Decision To Discharge 04/05/2025 06:30:40 PM   DISPOSITION CONDITION Stable           6:32 PM EDT  I have discussed with patient their diagnosis, treatment, and follow up plan. The patient agrees to follow up as outlined in discharge paperwork and also to return to the ED with any worsening. Seble Richardson PA-C         PATIENT REFERRED TO:  [facility] Emergency Department  [address] [facility] [id number] [phone]  Go to   As needed, If symptoms worsen    Toshia Pepe PA-C  [address]  [facility] [id number] [phone]    Schedule an appointment as soon as possible for a visit in 2 days  As needed    [facility]  [address] [facility] [id number] [phone]  Schedule an appointment as soon as possible for a visit in 1 week  As needed    [facility] ORTHOPAEDICS [facility] OFFICE  [address]  [facility] [address]  Schedule an appointment as soon as possible for a visit in 1 week  As needed       DISCHARGE MEDICATIONS:     Medication List        START taking these medications      acetaminophen 500 MG tablet  Commonly known as: TYLENOL  Take 2 tablets by mouth every 6 hours as needed for Pain     ibuprofen 800 MG tablet  Commonly known as: ADVIL;MOTRIN  Take 1 tablet by mouth every 8 hours as needed for Pain or Fever     lidocaine 5 %  Commonly known as: LIDODERM  Place 1 patch onto the skin daily for 10 days 12 hours on, 12 hours off.            ASK your doctor about these medications      Slow Fe 45 MG Tbcr extended release

## 2025-04-05 NOTE — ED NOTES
Pt presents ambulatory to the ED c/o chronic left ankle pain that was exacerbated after she went back to work march 30th. No new injury.    Emergency Department Nursing Plan of Care       The Nursing Plan of Care is developed from the Nursing assessment and Emergency Department Attending provider initial evaluation.  The plan of care may be reviewed in the “ED Provider note”.      The Plan of Care was developed with the following considerations:  Patient / Family readiness to learn indicated by:verbalized understanding  Persons(s) to be included in education: patient  Barriers to Learning/Limitations:None      Signed     COSTA BENAVIDES RN    4/5/2025   5:44 PM

## 2025-05-09 ENCOUNTER — HOSPITAL ENCOUNTER (EMERGENCY)
Facility: HOSPITAL | Age: 30
Discharge: HOME | End: 2025-05-09
Payer: COMMERCIAL

## 2025-05-09 VITALS
RESPIRATION RATE: 16 BRPM | WEIGHT: 147.27 LBS | BODY MASS INDEX: 22.32 KG/M2 | HEART RATE: 89 BPM | OXYGEN SATURATION: 100 % | TEMPERATURE: 98.4 F | SYSTOLIC BLOOD PRESSURE: 113 MMHG | DIASTOLIC BLOOD PRESSURE: 64 MMHG | HEIGHT: 68 IN

## 2025-05-09 DIAGNOSIS — J02.9 SORE THROAT: Primary | ICD-10-CM

## 2025-05-09 LAB — S PYO DNA THROAT QL NAA+PROBE: NOT DETECTED

## 2025-05-09 PROCEDURE — 6370000000 HC RX 637 (ALT 250 FOR IP)

## 2025-05-09 PROCEDURE — 87651 STREP A DNA AMP PROBE: CPT

## 2025-05-09 PROCEDURE — 99283 EMERGENCY DEPT VISIT LOW MDM: CPT

## 2025-05-09 RX ORDER — LIDOCAINE HYDROCHLORIDE 20 MG/ML
5 SOLUTION OROPHARYNGEAL
Qty: 100 ML | Refills: 0 | Status: SHIPPED | OUTPATIENT
Start: 2025-05-09

## 2025-05-09 RX ORDER — IBUPROFEN 600 MG/1
600 TABLET, FILM COATED ORAL 4 TIMES DAILY PRN
Qty: 40 TABLET | Refills: 0 | Status: SHIPPED | OUTPATIENT
Start: 2025-05-09

## 2025-05-09 RX ORDER — LIDOCAINE HYDROCHLORIDE 20 MG/ML
15 SOLUTION OROPHARYNGEAL
Status: COMPLETED | OUTPATIENT
Start: 2025-05-09 | End: 2025-05-09

## 2025-05-09 RX ORDER — IBUPROFEN 600 MG/1
600 TABLET, FILM COATED ORAL
Status: COMPLETED | OUTPATIENT
Start: 2025-05-09 | End: 2025-05-09

## 2025-05-09 RX ADMIN — IBUPROFEN 600 MG: 600 TABLET, FILM COATED ORAL at 16:38

## 2025-05-09 RX ADMIN — LIDOCAINE HYDROCHLORIDE 15 ML: 20 SOLUTION ORAL at 16:38

## 2025-05-09 ASSESSMENT — PAIN SCALES - GENERAL
PAINLEVEL_OUTOF10: 8
PAINLEVEL_OUTOF10: 7

## 2025-05-09 ASSESSMENT — PAIN DESCRIPTION - PAIN TYPE: TYPE: ACUTE PAIN

## 2025-05-09 ASSESSMENT — PAIN DESCRIPTION - LOCATION
LOCATION: THROAT
LOCATION: THROAT

## 2025-05-09 ASSESSMENT — PAIN - FUNCTIONAL ASSESSMENT: PAIN_FUNCTIONAL_ASSESSMENT: 0-10

## 2025-05-09 ASSESSMENT — PAIN DESCRIPTION - DESCRIPTORS
DESCRIPTORS: SORE
DESCRIPTORS: SORE

## 2025-05-09 ASSESSMENT — PAIN DESCRIPTION - ORIENTATION: ORIENTATION: MID

## 2025-05-09 NOTE — DISCHARGE INSTRUCTIONS
Thank you for choosing our Emergency Department for your care.  It is our privilege to care for you in your time of need.  In the next several days, you may receive a survey via email or mailed to your home about your experience with our team.  We would greatly appreciate you taking a few minutes to complete the survey, as we use this information to learn what we have done well and what we could be doing better. Thank you for trusting us with your care!    Below you will find a list of your tests from today's visit.   Labs and Radiology Studies  Recent Results (from the past 12 hours)   Group A Strep by PCR    Collection Time: 05/09/25  4:10 PM    Specimen: Swab; Throat   Result Value Ref Range    Strep Grp A PCR Not detected NOTD       No results found.  ------------------------------------------------------------------------------------------------------------  The evaluation and treatment you received in the Emergency Department were for an urgent problem. It is important that you follow-up with a doctor, nurse practitioner, or physician assistant to:  (1) confirm your diagnosis,  (2) re-evaluation of changes in your illness and treatment, and (3) for ongoing care. Please take your discharge instructions with you when you go to your follow-up appointment.     If you have any problem arranging a follow-up appointment, contact us!  If your symptoms become worse or you do not improve as expected, please return to us. We are available 24 hours a day.     If a prescription has been provided, please fill it as soon as possible to prevent a delay in treatment. If you have any questions or reservations about taking the medication due to side effects or interactions with other medications, please call your primary care provider or contact us directly.  Again, THANK YOU for choosing us to care for YOU!

## 2025-05-09 NOTE — ED PROVIDER NOTES
[facility] EMERGENCY DEPT  EMERGENCY DEPARTMENT HISTORY AND PHYSICAL EXAM      Date of evaluation: 5/9/2025  Patient Name: Teresa Donis  Birthdate 1995  MRN: [id number]  ED Provider: NATANAEL Castillo NP   Note Started: 4:28 PM EDT 5/9/25    HISTORY OF PRESENT ILLNESS     Chief Complaint   Patient presents with    Pharyngitis       History Provided By: Patient, only     HPI: Teresa Donis is a 30 y.o. female with past medical history as listed below, presents ambulatory into the emergency department with complaints of sore throat and pain with swallowing that started yesterday.  She has tried Vicks DayQuil, Ricola throat lozenges, and Tylenol without relief; only Tylenol this morning.  Denies fever/chills, cough, congestion, nausea/vomiting, abdominal pain, diarrhea, urinary symptoms, rash. Upon arrival to the ED pt is alert and oriented x 3, well-appearing, and interacting appropriately; no obvious distress noted.      PAST MEDICAL HISTORY   Past Medical History:  Past Medical History:   Diagnosis Date    Asthma        Past Surgical History:  Past Surgical History:   Procedure Laterality Date    ANTERIOR CRUCIATE LIGAMENT REPAIR Left 2012       Family History:  Family History   Problem Relation Age of Onset    Cancer Father     No Known Problems Mother     No Known Problems Sister     No Known Problems Brother     Cancer Maternal Grandmother     Cancer Maternal Grandfather     Cancer Paternal Grandmother     Cancer Paternal Grandfather        Social History:  Social History     Tobacco Use    Smoking status: Never    Smokeless tobacco: Never   Substance Use Topics    Alcohol use: No    Drug use: No       Allergies:  Allergies   Allergen Reactions    Latex Other (See Comments) and Rash     Skin irritation    Penicillins Hives, Itching and Swelling    Shellfish Allergy Itching       PCP: Toshia Pepe PA-C    Current Meds:   No current facility-administered medications for this encounter.     Current

## 2025-05-09 NOTE — ED TRIAGE NOTES
Pt arrives to the ED with complaints of a sore throat that she woke up with. Pt states it is painful to swallow and just sore. Pt states she took OTC meds and got no relief.

## 2025-05-09 NOTE — ED NOTES
Discharge instructions were given to the patient by Sapna VERAS. The patient left the Emergency Department ambulatory, alert and oriented and in no acute distress with 2 prescriptions. The patient was encouraged to call or return to the ED for worsening issues or problems and was encouraged to schedule a follow up appointment for continuing care. The patient verbalized understanding of discharge instructions and prescriptions, all questions were answered. The patient has no further concerns at this time.      
Pt presents to ED ambulatory complaining of sore throat x yesterday. Pt endorses right sided neck pain and right sided nasal congestion. Pt denies fever or chills. Pt denies body aches. Pt reports taking OTC medication, some relief. Pt is alert and oriented x 4, RR even and unlabored, skin is warm and dry. Assessment completed and pt updated on plan of care.  Call bell in reach.        Emergency Department Nursing Plan of Care       The Nursing Plan of Care is developed from the Nursing assessment and Emergency Department Attending provider initial evaluation.  The plan of care may be reviewed in the “ED Provider note”.    The Plan of Care was developed with the following considerations:   Patient / Family readiness to learn indicated by:verbalized understanding  Persons(s) to be included in education: patient  Barriers to Learning/Limitations:None    Signed      
within normal limits

## 2025-07-24 ENCOUNTER — HOSPITAL ENCOUNTER (EMERGENCY)
Facility: HOSPITAL | Age: 30
Discharge: HOME | End: 2025-07-24
Payer: COMMERCIAL

## 2025-07-24 VITALS
DIASTOLIC BLOOD PRESSURE: 73 MMHG | WEIGHT: 136.69 LBS | HEART RATE: 71 BPM | BODY MASS INDEX: 20.78 KG/M2 | OXYGEN SATURATION: 98 % | RESPIRATION RATE: 20 BRPM | SYSTOLIC BLOOD PRESSURE: 114 MMHG | TEMPERATURE: 98 F

## 2025-07-24 DIAGNOSIS — R39.9 URINARY SYMPTOM OR SIGN: Primary | ICD-10-CM

## 2025-07-24 LAB
APPEARANCE UR: CLEAR
BACTERIA URNS QL MICRO: NEGATIVE /HPF
BILIRUB UR QL: NEGATIVE
COLOR UR: ABNORMAL
EPITH CASTS URNS QL MICRO: ABNORMAL /LPF
GLUCOSE UR STRIP.AUTO-MCNC: NEGATIVE MG/DL
HCG UR QL: NEGATIVE
HGB UR QL STRIP: NEGATIVE
KETONES UR QL STRIP.AUTO: ABNORMAL MG/DL
LEUKOCYTE ESTERASE UR QL STRIP.AUTO: NEGATIVE
NITRITE UR QL STRIP.AUTO: NEGATIVE
PH UR STRIP: 6 (ref 5–8)
PROT UR STRIP-MCNC: 30 MG/DL
RBC #/AREA URNS HPF: ABNORMAL /HPF (ref 0–5)
SP GR UR REFRACTOMETRY: 1.02
URINE CULTURE IF INDICATED: ABNORMAL
UROBILINOGEN UR QL STRIP.AUTO: 1 EU/DL (ref 0.2–1)
WBC URNS QL MICRO: ABNORMAL /HPF (ref 0–4)

## 2025-07-24 PROCEDURE — 81025 URINE PREGNANCY TEST: CPT

## 2025-07-24 PROCEDURE — 99283 EMERGENCY DEPT VISIT LOW MDM: CPT

## 2025-07-24 PROCEDURE — 81001 URINALYSIS AUTO W/SCOPE: CPT

## 2025-07-24 ASSESSMENT — PAIN - FUNCTIONAL ASSESSMENT: PAIN_FUNCTIONAL_ASSESSMENT: NONE - DENIES PAIN

## 2025-07-24 NOTE — ED NOTES
Pt presents to ED complaining of urinary frequency and irritation. Pt reports her stomach hurts when she took a OTC pill last night. Pt denies pain and burning sensation, rashes or discharge. Pt denies nausea and vomiting.  Pt is alert and oriented x 4, RR even and unlabored, skin is warm and dry. Assesment completed and pt updated on plan of care.       Emergency Department Nursing Plan of Care       The Nursing Plan of Care is developed from the Nursing assessment and Emergency Department Attending provider initial evaluation.  The plan of care may be reviewed in the “ED Provider note”.    The Plan of Care was developed with the following considerations:   Presenting ambulatory assessment: Ambulating at baseline  Patient / Family readiness to learn indicated by: verbalized understanding  Persons(s) to be included in education: patient   Barriers to Learning/Limitations: None    Signed     EDITA HELM RN    7/24/2025   5:48 PM

## 2025-07-24 NOTE — DISCHARGE INSTRUCTIONS
Thank You!    It was a pleasure taking care of you in our Emergency Department today. We know that when you come to [facility], you are entrusting us with your health, comfort, and safety. Our clinicians honor that trust, and truly appreciate the opportunity to care for you and your loved ones.    If you receive a survey about your Emergency Department experience today, please fill it out.  We value your feedback. Thank you.      Ashley Jimenez PA-C    ___________________________________  I have included a copy of your lab results and/or radiologic studies from today's visit so you can have them easily available at your follow-up visit.   Recent Results (from the past 12 hours)   Urinalysis with Reflex to Culture    Collection Time: 07/24/25  5:51 PM    Specimen: Urine   Result Value Ref Range    Color, UA YELLOW/STRAW      Appearance CLEAR CLEAR      Specific Gravity, UA 1.025      pH, Urine 6.0 5.0 - 8.0      Protein, UA 30 (A) NEG mg/dL    Glucose, Ur Negative NEG mg/dL    Ketones, Urine TRACE (A) NEG mg/dL    Bilirubin, Urine Negative NEG      Blood, Urine Negative NEG      Urobilinogen, Urine 1.0 0.2 - 1.0 EU/dL    Nitrite, Urine Negative NEG      Leukocyte Esterase, Urine Negative NEG      WBC, UA 0-4 0 - 4 /hpf    RBC, UA 0-5 0 - 5 /hpf    Epithelial Cells, UA FEW FEW /lpf    BACTERIA, URINE Negative NEG /hpf    Urine Culture if Indicated CULTURE NOT INDICATED BY UA RESULT CNI     POC Pregnancy Urine Qual    Collection Time: 07/24/25  5:52 PM   Result Value Ref Range    Preg Test, Ur Negative NEG         No orders to display     [unfilled]

## 2025-07-24 NOTE — ED PROVIDER NOTES
[facility] EMERGENCY DEPARTMENT  EMERGENCY DEPARTMENT ENCOUNTER       Pt Name: Teresa Donis  MRN: [id number]  Birthdate 1995  Date of evaluation: 7/24/2025  Provider: ТАТЬЯНА Valenzuela   PCP: Toshia Pepe PA-C  Note Started: 5:53 PM EDT 7/24/25     CHIEF COMPLAINT       Chief Complaint   Patient presents with    Urinary Frequency        HISTORY OF PRESENT ILLNESS: 1 or more elements      History From: Patient  None     Teresa Donis is a 30 y.o. female with a history of asthma, and additional history as noted below who presents to the ED for evaluation of urinary frequency and intermittent irritation for the past 2 days.  Endorses urinary urgency and a few intermittent episodes of discomfort, but she denies any burning with urination.  Denies any hematuria.  States her only episode of abdominal pain was when she took an over-the-counter pill last night (AZO) for symptoms.  Denies any nausea or vomiting.   Denies vaginal discharge or bleeding. Denies concern for STDs or STIs.  Denies fever, chest pain, shortness of breath.         Nursing Notes were all reviewed and agreed with or any disagreements were addressed in the HPI.     REVIEW OF SYSTEMS      Review of Systems   All other systems reviewed and are negative.       Positives and Pertinent negatives as per HPI.    PAST HISTORY     Past Medical History:  Past Medical History:   Diagnosis Date    Asthma        Past Surgical History:  Past Surgical History:   Procedure Laterality Date    ANTERIOR CRUCIATE LIGAMENT REPAIR Left 2012       Family History:  Family History   Problem Relation Age of Onset    Cancer Father     No Known Problems Mother     No Known Problems Sister     No Known Problems Brother     Cancer Maternal Grandmother     Cancer Maternal Grandfather     Cancer Paternal Grandmother     Cancer Paternal Grandfather        Social History:  Social History     Tobacco Use    Smoking status: Never    Smokeless tobacco: Never

## 2025-07-24 NOTE — ED TRIAGE NOTES
Pt c/o urinary frequency and pressure. Pt reports s/s started 2 days ago. Pt reports taking AZO to help with s/s.